# Patient Record
Sex: MALE | Race: WHITE | NOT HISPANIC OR LATINO | URBAN - METROPOLITAN AREA
[De-identification: names, ages, dates, MRNs, and addresses within clinical notes are randomized per-mention and may not be internally consistent; named-entity substitution may affect disease eponyms.]

---

## 2017-08-31 ENCOUNTER — EMERGENCY (EMERGENCY)
Facility: HOSPITAL | Age: 47
LOS: 1 days | Discharge: HOME | End: 2017-08-31
Attending: EMERGENCY MEDICINE | Admitting: EMERGENCY MEDICINE
Payer: COMMERCIAL

## 2017-08-31 VITALS
WEIGHT: 203.93 LBS | RESPIRATION RATE: 18 BRPM | HEART RATE: 102 BPM | HEIGHT: 67 IN | DIASTOLIC BLOOD PRESSURE: 88 MMHG | TEMPERATURE: 98 F | OXYGEN SATURATION: 98 % | SYSTOLIC BLOOD PRESSURE: 153 MMHG

## 2017-08-31 PROCEDURE — 71020: CPT | Mod: 26

## 2017-08-31 PROCEDURE — 99283 EMERGENCY DEPT VISIT LOW MDM: CPT | Mod: 25

## 2017-08-31 PROCEDURE — 99284 EMERGENCY DEPT VISIT MOD MDM: CPT

## 2017-08-31 PROCEDURE — 71046 X-RAY EXAM CHEST 2 VIEWS: CPT

## 2017-08-31 RX ORDER — LIDOCAINE 4 G/100G
1 CREAM TOPICAL ONCE
Qty: 0 | Refills: 0 | Status: COMPLETED | OUTPATIENT
Start: 2017-08-31 | End: 2017-08-31

## 2017-08-31 RX ORDER — LIDOCAINE 4 G/100G
1 CREAM TOPICAL
Qty: 1 | Refills: 0 | OUTPATIENT
Start: 2017-08-31 | End: 2017-09-07

## 2017-08-31 RX ORDER — ACETAMINOPHEN 500 MG
650 TABLET ORAL ONCE
Qty: 0 | Refills: 0 | Status: COMPLETED | OUTPATIENT
Start: 2017-08-31 | End: 2017-08-31

## 2017-08-31 RX ORDER — DIAZEPAM 5 MG
1 TABLET ORAL
Qty: 9 | Refills: 0 | OUTPATIENT
Start: 2017-08-31 | End: 2017-09-03

## 2017-08-31 RX ADMIN — LIDOCAINE 1 PATCH: 4 CREAM TOPICAL at 19:22

## 2017-08-31 RX ADMIN — Medication 650 MILLIGRAM(S): at 19:22

## 2017-08-31 NOTE — ED PROVIDER NOTE - MEDICAL DECISION MAKING DETAILS
45 yo M PMHx HTN, HLD, hypothyroidism p/w mid back pain following a twisting motion at work about 30 mins ago, pain control, CXR, reevaluate

## 2017-08-31 NOTE — ED PROVIDER NOTE - NS ED ROS FT
GENERAL: No fever or chills, EYES: no change in vision, HEENT: no trouble swallowing or speaking, CARDIAC: no chest pain, PULMONARY: no cough or SOB, GI: no abdominal pain, : No changes in urination, SKIN: no rashes, NEURO: no headache,  MSK: mid back pain on left side ~Kusum Marrero M.D. Resident

## 2017-08-31 NOTE — ED PROVIDER NOTE - OBJECTIVE STATEMENT
47 yo M PMHx HLD, hypothyroidism, HTN p/w mid back pain following a twisting motion about 30 minutes ago at work. Pt bent down to  some papers and felt a sharp pain in his back. He was able to ambulate following the incident. He denies bowel or bladder changes, IVDU, recent corticosteroid use, hx of cancer.

## 2017-08-31 NOTE — ED PROVIDER NOTE - CARE PLAN
Principal Discharge DX:	Back pain  Instructions for follow-up, activity and diet:	You were seen in the Emergency Department for back pain.   1) Advance activity as tolerated. Avoid any heavy lifting or twisting motions.   2) Continue all previously prescribed medications as directed.    3) Follow up with your primary care physician in 24-48 hours.   4) You may take Diazepam (Valium) 5 mg every 8 hours as needed for pain for up to 3 days. You may take ibuprofen, 600 mg, every 6 to 8 hours for up to 2 weeks for pain control. You may take Tylenol (acetaminophen) 1000 mg every 6 to 8 hours with a daily maximal dose of 3000 mg.  5) Return to the Emergency Department for weakness or numbness/tingling in your legs, urinary retention or incontinence, pain unrelieved by medication, worsening or persistent symptoms, and/or ANY NEW OR CONCERNING SYMPTOMS.   6) If you have issues obtaining follow up, please call: [phone] to obtain a doctor or specialist who takes your insurance in your area. Principal Discharge DX:	Back pain  Instructions for follow-up, activity and diet:	You were seen in the Emergency Department for back pain.   1) Advance activity as tolerated. Avoid any heavy lifting or twisting motions.   2) Continue all previously prescribed medications as directed.    3) Follow up with your primary care physician in 24-48 hours.   4) You may take Diazepam (Valium) 5 mg every 8 hours as needed for pain for up to 3 days. You may take ibuprofen, 600 mg, every 6 to 8 hours for up to 2 weeks for pain control. You may take Tylenol (acetaminophen) 1000 mg every 6 to 8 hours with a daily maximal dose of 3000 mg.  5) Return to the Emergency Department for weakness or numbness/tingling in your legs, urinary retention or incontinence, pain unrelieved by medication, worsening or persistent symptoms, and/or ANY NEW OR CONCERNING SYMPTOMS.   6) If you have issues obtaining follow up, please call: [phone] to obtain a doctor or specialist who takes your insurance in your area.

## 2017-08-31 NOTE — ED PROVIDER NOTE - PLAN OF CARE
You were seen in the Emergency Department for back pain.   1) Advance activity as tolerated. Avoid any heavy lifting or twisting motions.   2) Continue all previously prescribed medications as directed.    3) Follow up with your primary care physician in 24-48 hours.   4) You may take Diazepam (Valium) 5 mg every 8 hours as needed for pain for up to 3 days. You may take ibuprofen, 600 mg, every 6 to 8 hours for up to 2 weeks for pain control. You may take Tylenol (acetaminophen) 1000 mg every 6 to 8 hours with a daily maximal dose of 3000 mg.  5) Return to the Emergency Department for weakness or numbness/tingling in your legs, urinary retention or incontinence, pain unrelieved by medication, worsening or persistent symptoms, and/or ANY NEW OR CONCERNING SYMPTOMS.   6) If you have issues obtaining follow up, please call: [phone] to obtain a doctor or specialist who takes your insurance in your area.

## 2017-08-31 NOTE — ED PROVIDER NOTE - ATTENDING CONTRIBUTION TO CARE
L lower lateral thoracic back pain since reaching/twisting/bending for an object earlier in the afternoon.  "Felt something pop".  No other complaints.  No numbness, tingling and weakness, no midline pain.    On exam patient well appearing, vital signs within normal limits, no midline tenderness to palpation, palpation of lower left thoracic ribs reproduces complaint.    MSK thoracic cage pain, do not suspect rib fracture, no evidence of spinal cord emergency, will treat pain, plain films of chest per patient request, re-evaluate.

## 2017-08-31 NOTE — ED PROVIDER NOTE - PHYSICAL EXAMINATION
Gen: AAOx3, non-toxic  Head: NCAT  HEENT: EOMI, oral mucosa moist, normal conjunctiva  Lung: CTAB, no respiratory distress, no wheezes/rhonchi/rales B/L, speaking in full sentences  CV: RRR, no murmurs, rubs or gallops  Abd: soft, NTND, no guarding  MSK: no visible deformities, mid thoracic tenderness, paraspinal tenderness in left thoracic region  Neuro: No focal sensory or motor deficits, +5/5 strength BLLE, +2 patellar reflexes bilaterally, negative SLR bilaterally  Skin: Warm, well perfused, no rash  Psych: normal affect.   ~Kusum Marrero M.D. Resident

## 2017-08-31 NOTE — ED ADULT NURSE NOTE - OBJECTIVE STATEMENT
47 y/o male presents to the ED. A&Ox3. C/O L upper back pain. Pt. reports lifting something while working and " I heard a snap" No bruising, edema, or bleeding present. +Easy work of breathing. L upper  upon palpation. Peripheral pulse present, Cap refill<2 seconds. Pt. denies the pain radiates to anywhere else.  Steady gait. Full range of motion of upper and lower extremities. Skin is intact, dry, and warm to touch. Pt. denies headache, blurry vision, SOB, chest pain, N/V/D, or numbness and tingling. Safety maintained.

## 2017-08-31 NOTE — ED PROVIDER NOTE - PROGRESS NOTE DETAILS
Pt feels minimal relief from Tylenol and lidoderm patch, agrees with plan to discharge home with Rx for 3 days of Diazepam and lidoderm patches. Pt verbalizes understanding of follow up with PMD.  Kusum Marrero M.D. Resident

## 2017-11-14 ENCOUNTER — TRANSCRIPTION ENCOUNTER (OUTPATIENT)
Age: 47
End: 2017-11-14

## 2017-12-13 ENCOUNTER — APPOINTMENT (OUTPATIENT)
Dept: OTOLARYNGOLOGY | Facility: CLINIC | Age: 47
End: 2017-12-13

## 2018-01-11 ENCOUNTER — TRANSCRIPTION ENCOUNTER (OUTPATIENT)
Age: 48
End: 2018-01-11

## 2018-01-19 ENCOUNTER — TRANSCRIPTION ENCOUNTER (OUTPATIENT)
Age: 48
End: 2018-01-19

## 2018-03-14 ENCOUNTER — EMERGENCY (EMERGENCY)
Facility: HOSPITAL | Age: 48
LOS: 1 days | Discharge: HOME | End: 2018-03-14
Attending: EMERGENCY MEDICINE | Admitting: EMERGENCY MEDICINE
Payer: COMMERCIAL

## 2018-03-14 VITALS
SYSTOLIC BLOOD PRESSURE: 127 MMHG | OXYGEN SATURATION: 98 % | RESPIRATION RATE: 18 BRPM | HEIGHT: 67 IN | HEART RATE: 108 BPM | DIASTOLIC BLOOD PRESSURE: 75 MMHG | WEIGHT: 184.97 LBS | TEMPERATURE: 98 F

## 2018-03-14 PROCEDURE — 99283 EMERGENCY DEPT VISIT LOW MDM: CPT

## 2018-03-14 PROCEDURE — 99282 EMERGENCY DEPT VISIT SF MDM: CPT

## 2018-03-14 RX ORDER — ONDANSETRON 8 MG/1
1 TABLET, FILM COATED ORAL
Qty: 12 | Refills: 0 | OUTPATIENT
Start: 2018-03-14 | End: 2018-03-16

## 2018-03-14 NOTE — ED PROVIDER NOTE - OBJECTIVE STATEMENT
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  No hx of inguinal hernias.  Denies any significant abdominal pain or discomfort.  Denies any relevant pmhx/pshx.

## 2018-03-14 NOTE — ED ADULT NURSE NOTE - OBJECTIVE STATEMENT
46 yo male experiencing RLQ pain following transferring a patient about 30 min ago. He c/o sudden onset right groin pain after pulling patient up on stretcher. There is no bulge present at the moment. Patient observed ambulating without difficulty. There is NAD. Will continue to monitor. Safety and comfort maintained.

## 2018-03-14 NOTE — ED PROVIDER NOTE - MEDICAL DECISION MAKING DETAILS
Inguinal hernia that is non-obstructing or not incarcerated. general surgery outpatient follow up advised.

## 2018-03-14 NOTE — ED PROVIDER NOTE - ATTENDING CONTRIBUTION TO CARE
Attending MD Del Cid: I personally have seen and examined this patient.  Resident note reviewed and agree on plan of care and except where noted.  See below for details.    47M with PMH HTN on HCTZ, Losartan presents to the ED with abdominal bulge after lifting.  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  Denies history of inguinal hernias.  Denies testicular pain.  Denies loss of urinary or bowel continence. Denies dysuria, hematuria. Denies nausea, vomiting, blood in stools.  On exam, NAD, head NCAT, PERRL, FROM at neck, no tenderness to palpation or stepoffs along length of spine, lungs CTAB with good inspiratory effort, +S1S2, no m/r/g, abdomen soft with +BS, NT, ND, no CVAT, +L inguinal hernia self reduces with laying supine, moving all extremities with 5/5 strength bilateral upper and lower extremities, good and equal  strength bilaterally, sensory grossly intact; A/P: 47M with L inguinal hernia, after heavy lifting, will discharge with precautions of no heavy lifting, stool softeners to avoid having to strain and return precautions if hernia does not self reduces with laying supine or with gentle pressure, patient to follow up with general surgery clinic for likely repair. Follow up instructions given, importance of follow up emphasized, return to ED parameters reviewed and patient verbalized understanding.  All questions answered, all concerns addressed.

## 2018-03-29 ENCOUNTER — APPOINTMENT (OUTPATIENT)
Dept: SURGERY | Facility: CLINIC | Age: 48
End: 2018-03-29
Payer: OTHER MISCELLANEOUS

## 2018-03-29 VITALS
BODY MASS INDEX: 28.25 KG/M2 | OXYGEN SATURATION: 99 % | HEIGHT: 67 IN | DIASTOLIC BLOOD PRESSURE: 86 MMHG | HEART RATE: 98 BPM | WEIGHT: 180 LBS | SYSTOLIC BLOOD PRESSURE: 143 MMHG | TEMPERATURE: 97.9 F | RESPIRATION RATE: 16 BRPM

## 2018-03-29 DIAGNOSIS — Z78.9 OTHER SPECIFIED HEALTH STATUS: ICD-10-CM

## 2018-03-29 DIAGNOSIS — Z83.3 FAMILY HISTORY OF DIABETES MELLITUS: ICD-10-CM

## 2018-03-29 PROCEDURE — 99204 OFFICE O/P NEW MOD 45 MIN: CPT

## 2018-03-30 ENCOUNTER — OTHER (OUTPATIENT)
Age: 48
End: 2018-03-30

## 2018-04-04 ENCOUNTER — APPOINTMENT (OUTPATIENT)
Dept: FAMILY MEDICINE | Facility: CLINIC | Age: 48
End: 2018-04-04

## 2018-04-13 ENCOUNTER — TRANSCRIPTION ENCOUNTER (OUTPATIENT)
Age: 48
End: 2018-04-13

## 2018-04-13 ENCOUNTER — APPOINTMENT (OUTPATIENT)
Dept: FAMILY MEDICINE | Facility: CLINIC | Age: 48
End: 2018-04-13
Payer: COMMERCIAL

## 2018-04-13 ENCOUNTER — NON-APPOINTMENT (OUTPATIENT)
Age: 48
End: 2018-04-13

## 2018-04-13 VITALS
SYSTOLIC BLOOD PRESSURE: 138 MMHG | HEIGHT: 67 IN | HEART RATE: 88 BPM | DIASTOLIC BLOOD PRESSURE: 76 MMHG | WEIGHT: 189 LBS | BODY MASS INDEX: 29.66 KG/M2

## 2018-04-13 PROCEDURE — 93000 ELECTROCARDIOGRAM COMPLETE: CPT

## 2018-04-13 PROCEDURE — 36415 COLL VENOUS BLD VENIPUNCTURE: CPT

## 2018-04-13 PROCEDURE — 99205 OFFICE O/P NEW HI 60 MIN: CPT | Mod: 25

## 2018-04-13 RX ORDER — SILDENAFIL 20 MG/1
20 TABLET ORAL DAILY
Qty: 30 | Refills: 0 | Status: DISCONTINUED | COMMUNITY
Start: 2018-04-13 | End: 2018-04-13

## 2018-04-13 RX ORDER — LOSARTAN POTASSIUM 100 MG/1
TABLET, FILM COATED ORAL
Refills: 0 | Status: DISCONTINUED | COMMUNITY
End: 2018-04-13

## 2018-04-16 LAB
25(OH)D3 SERPL-MCNC: 66.6 NG/ML
ALBUMIN SERPL ELPH-MCNC: 5 G/DL
ALP BLD-CCNC: 51 U/L
ALT SERPL-CCNC: 26 U/L
ANION GAP SERPL CALC-SCNC: 12 MMOL/L
APPEARANCE: CLEAR
AST SERPL-CCNC: 25 U/L
BASOPHILS # BLD AUTO: 0.02 K/UL
BASOPHILS NFR BLD AUTO: 0.3 %
BILIRUB SERPL-MCNC: 0.5 MG/DL
BILIRUBIN URINE: NEGATIVE
BLOOD URINE: NEGATIVE
BUN SERPL-MCNC: 16 MG/DL
CALCIUM SERPL-MCNC: 9.5 MG/DL
CHLORIDE SERPL-SCNC: 100 MMOL/L
CHOLEST SERPL-MCNC: 115 MG/DL
CHOLEST/HDLC SERPL: 2.6 RATIO
CO2 SERPL-SCNC: 29 MMOL/L
COLOR: YELLOW
CREAT SERPL-MCNC: 1.5 MG/DL
EOSINOPHIL # BLD AUTO: 0.14 K/UL
EOSINOPHIL NFR BLD AUTO: 1.9 %
FERRITIN SERPL-MCNC: 250 NG/ML
FOLATE SERPL-MCNC: 12.9 NG/ML
GLUCOSE QUALITATIVE U: NEGATIVE MG/DL
GLUCOSE SERPL-MCNC: 85 MG/DL
HBA1C MFR BLD HPLC: 5 %
HCT VFR BLD CALC: 50.1 %
HDLC SERPL-MCNC: 45 MG/DL
HGB BLD-MCNC: 16.7 G/DL
IMM GRANULOCYTES NFR BLD AUTO: 0.3 %
IRON SATN MFR SERPL: 25 %
IRON SERPL-MCNC: 75 UG/DL
KETONES URINE: NEGATIVE
LDLC SERPL CALC-MCNC: 61 MG/DL
LEUKOCYTE ESTERASE URINE: NEGATIVE
LYMPHOCYTES # BLD AUTO: 1.91 K/UL
LYMPHOCYTES NFR BLD AUTO: 26.1 %
MAN DIFF?: NORMAL
MCHC RBC-ENTMCNC: 30.7 PG
MCHC RBC-ENTMCNC: 33.3 GM/DL
MCV RBC AUTO: 92.1 FL
MONOCYTES # BLD AUTO: 0.87 K/UL
MONOCYTES NFR BLD AUTO: 11.9 %
NEUTROPHILS # BLD AUTO: 4.35 K/UL
NEUTROPHILS NFR BLD AUTO: 59.5 %
NITRITE URINE: NEGATIVE
PH URINE: 7
PLATELET # BLD AUTO: 262 K/UL
POTASSIUM SERPL-SCNC: 4.3 MMOL/L
PROT SERPL-MCNC: 7.1 G/DL
PROTEIN URINE: NEGATIVE MG/DL
RBC # BLD: 5.44 M/UL
RBC # FLD: 14.2 %
SODIUM SERPL-SCNC: 141 MMOL/L
SPECIFIC GRAVITY URINE: 1.01
T4 FREE SERPL-MCNC: 1.6 NG/DL
TIBC SERPL-MCNC: 306 UG/DL
TRIGL SERPL-MCNC: 44 MG/DL
TSH SERPL-ACNC: 2.32 UIU/ML
UIBC SERPL-MCNC: 231 UG/DL
UROBILINOGEN URINE: NEGATIVE MG/DL
VIT B12 SERPL-MCNC: 1083 PG/ML
WBC # FLD AUTO: 7.31 K/UL

## 2018-04-18 ENCOUNTER — OUTPATIENT (OUTPATIENT)
Dept: OUTPATIENT SERVICES | Facility: HOSPITAL | Age: 48
LOS: 1 days | End: 2018-04-18
Payer: COMMERCIAL

## 2018-04-18 VITALS
WEIGHT: 188.05 LBS | DIASTOLIC BLOOD PRESSURE: 80 MMHG | RESPIRATION RATE: 16 BRPM | OXYGEN SATURATION: 98 % | TEMPERATURE: 98 F | HEART RATE: 96 BPM | HEIGHT: 67 IN | SYSTOLIC BLOOD PRESSURE: 138 MMHG

## 2018-04-18 DIAGNOSIS — Z01.818 ENCOUNTER FOR OTHER PREPROCEDURAL EXAMINATION: ICD-10-CM

## 2018-04-18 DIAGNOSIS — I10 ESSENTIAL (PRIMARY) HYPERTENSION: ICD-10-CM

## 2018-04-18 DIAGNOSIS — K40.90 UNILATERAL INGUINAL HERNIA, WITHOUT OBSTRUCTION OR GANGRENE, NOT SPECIFIED AS RECURRENT: ICD-10-CM

## 2018-04-18 DIAGNOSIS — E03.9 HYPOTHYROIDISM, UNSPECIFIED: ICD-10-CM

## 2018-04-18 PROCEDURE — G0463: CPT

## 2018-04-18 RX ORDER — LIDOCAINE HCL 20 MG/ML
0.2 VIAL (ML) INJECTION ONCE
Qty: 0 | Refills: 0 | Status: DISCONTINUED | OUTPATIENT
Start: 2018-04-23 | End: 2018-05-08

## 2018-04-18 RX ORDER — SODIUM CHLORIDE 9 MG/ML
3 INJECTION INTRAMUSCULAR; INTRAVENOUS; SUBCUTANEOUS EVERY 8 HOURS
Qty: 0 | Refills: 0 | Status: DISCONTINUED | OUTPATIENT
Start: 2018-04-23 | End: 2018-05-08

## 2018-04-18 NOTE — H&P PST ADULT - NSANTHOSAYNRD_GEN_A_CORE
No. DIANE screening performed.  STOP BANG Legend: 0-2 = LOW Risk; 3-4 = INTERMEDIATE Risk; 5-8 = HIGH Risk

## 2018-04-18 NOTE — H&P PST ADULT - HISTORY OF PRESENT ILLNESS
47 year old male with PMH HTN, hypothyroidism, HLD presents with hx of groin pain pre right inguinal hernia repair scheduled for 4/23/19. 47 year old male with PMH HTN, hypothyroidism, HLD presents with hx of groin pain pre right inguinal hernia repair scheduled for 4/23/19. Baseline creatinine 1.5 as per pt due to increased muscle mass

## 2018-04-18 NOTE — H&P PST ADULT - PMH
HTN (hypertension)    Hypercholesteremia HTN (hypertension)    Hypercholesteremia    Hypothyroidism    Unilateral inguinal hernia without obstruction or gangrene, recurrence not specified

## 2018-04-22 ENCOUNTER — TRANSCRIPTION ENCOUNTER (OUTPATIENT)
Age: 48
End: 2018-04-22

## 2018-04-23 ENCOUNTER — OUTPATIENT (OUTPATIENT)
Dept: OUTPATIENT SERVICES | Facility: HOSPITAL | Age: 48
LOS: 1 days | End: 2018-04-23
Payer: COMMERCIAL

## 2018-04-23 ENCOUNTER — APPOINTMENT (OUTPATIENT)
Dept: SURGERY | Facility: HOSPITAL | Age: 48
End: 2018-04-23
Payer: OTHER MISCELLANEOUS

## 2018-04-23 VITALS
DIASTOLIC BLOOD PRESSURE: 86 MMHG | HEART RATE: 96 BPM | RESPIRATION RATE: 16 BRPM | TEMPERATURE: 98 F | WEIGHT: 188.05 LBS | SYSTOLIC BLOOD PRESSURE: 145 MMHG | HEIGHT: 67 IN | OXYGEN SATURATION: 100 %

## 2018-04-23 VITALS
HEART RATE: 74 BPM | SYSTOLIC BLOOD PRESSURE: 117 MMHG | RESPIRATION RATE: 16 BRPM | OXYGEN SATURATION: 97 % | DIASTOLIC BLOOD PRESSURE: 70 MMHG

## 2018-04-23 DIAGNOSIS — K40.90 UNILATERAL INGUINAL HERNIA, WITHOUT OBSTRUCTION OR GANGRENE, NOT SPECIFIED AS RECURRENT: ICD-10-CM

## 2018-04-23 DIAGNOSIS — Z01.818 ENCOUNTER FOR OTHER PREPROCEDURAL EXAMINATION: ICD-10-CM

## 2018-04-23 PROCEDURE — 49525 REPAIR ING HERNIA SLIDING: CPT | Mod: RT

## 2018-04-23 PROCEDURE — C1781: CPT

## 2018-04-23 RX ORDER — FAMOTIDINE 10 MG/ML
20 INJECTION INTRAVENOUS ONCE
Qty: 0 | Refills: 0 | Status: COMPLETED | OUTPATIENT
Start: 2018-04-23 | End: 2018-04-23

## 2018-04-23 RX ORDER — HYDROMORPHONE HYDROCHLORIDE 2 MG/ML
0.25 INJECTION INTRAMUSCULAR; INTRAVENOUS; SUBCUTANEOUS
Qty: 0 | Refills: 0 | Status: DISCONTINUED | OUTPATIENT
Start: 2018-04-23 | End: 2018-04-23

## 2018-04-23 RX ORDER — ACETAMINOPHEN 500 MG
1000 TABLET ORAL ONCE
Qty: 0 | Refills: 0 | Status: COMPLETED | OUTPATIENT
Start: 2018-04-23 | End: 2018-04-23

## 2018-04-23 RX ORDER — OXYCODONE HYDROCHLORIDE 5 MG/1
10 TABLET ORAL ONCE
Qty: 0 | Refills: 0 | Status: DISCONTINUED | OUTPATIENT
Start: 2018-04-23 | End: 2018-04-23

## 2018-04-23 RX ORDER — SODIUM CHLORIDE 9 MG/ML
1000 INJECTION, SOLUTION INTRAVENOUS
Qty: 0 | Refills: 0 | Status: DISCONTINUED | OUTPATIENT
Start: 2018-04-23 | End: 2018-05-08

## 2018-04-23 RX ORDER — CELECOXIB 200 MG/1
200 CAPSULE ORAL ONCE
Qty: 0 | Refills: 0 | Status: DISCONTINUED | OUTPATIENT
Start: 2018-04-23 | End: 2018-05-08

## 2018-04-23 RX ORDER — ONDANSETRON 8 MG/1
4 TABLET, FILM COATED ORAL ONCE
Qty: 0 | Refills: 0 | Status: COMPLETED | OUTPATIENT
Start: 2018-04-23 | End: 2018-04-23

## 2018-04-23 RX ORDER — CEFAZOLIN SODIUM 1 G
2000 VIAL (EA) INJECTION ONCE
Qty: 0 | Refills: 0 | Status: COMPLETED | OUTPATIENT
Start: 2018-04-23 | End: 2018-04-23

## 2018-04-23 RX ORDER — CELECOXIB 200 MG/1
200 CAPSULE ORAL ONCE
Qty: 0 | Refills: 0 | Status: COMPLETED | OUTPATIENT
Start: 2018-04-23 | End: 2018-04-23

## 2018-04-23 RX ADMIN — FAMOTIDINE 20 MILLIGRAM(S): 10 INJECTION INTRAVENOUS at 10:02

## 2018-04-23 RX ADMIN — CELECOXIB 200 MILLIGRAM(S): 200 CAPSULE ORAL at 10:02

## 2018-04-23 RX ADMIN — Medication 1000 MILLIGRAM(S): at 10:02

## 2018-04-23 RX ADMIN — ONDANSETRON 4 MILLIGRAM(S): 8 TABLET, FILM COATED ORAL at 11:33

## 2018-04-23 RX ADMIN — OXYCODONE HYDROCHLORIDE 10 MILLIGRAM(S): 5 TABLET ORAL at 11:32

## 2018-04-23 NOTE — ASU PATIENT PROFILE, ADULT - PMH
HTN (hypertension)    Hypercholesteremia    Hypothyroidism    Unilateral inguinal hernia without obstruction or gangrene, recurrence not specified

## 2018-04-23 NOTE — ASU DISCHARGE PLAN (ADULT/PEDIATRIC). - ITEMS TO FOLLOWUP WITH YOUR PHYSICIAN'S
Please call to schedule an appointment with Dr. Rollins this Thursday to evaluate returning to work on Monday.

## 2018-04-23 NOTE — ASU DISCHARGE PLAN (ADULT/PEDIATRIC). - SPECIAL INSTRUCTIONS
You may alternate taking Tylenol/Motrin as needed for mild-moderate pain and you can take the Percocet prescribed to you for severe pain, but do not drive while taking it.

## 2018-04-23 NOTE — ASU DISCHARGE PLAN (ADULT/PEDIATRIC). - MEDICATION SUMMARY - MEDICATIONS TO TAKE
I will START or STAY ON the medications listed below when I get home from the hospital:    aspirin 81 mg oral delayed release capsule  --  by mouth   -- Indication: For blood thinner    Percocet 5/325 oral tablet  -- 1 tab(s) by mouth every 6 hours, As Needed for moderate to severe pain. MDD:4 tabs  -- Caution federal law prohibits the transfer of this drug to any person other  than the person for whom it was prescribed.  May cause drowsiness.  Alcohol may intensify this effect.  Use care when operating dangerous machinery.  This prescription cannot be refilled.  This product contains acetaminophen.  Do not use  with any other product containing acetaminophen to prevent possible liver damage.  Using more of this medication than prescribed may cause serious breathing problems.    -- Indication: For moderate to severe pain    losartan 100 mg oral tablet  -- 1 tab(s) by mouth once a day  -- Indication: For hypertension    atorvastatin 10 mg oral tablet  -- 1 tab(s) by mouth once a day  -- Indication: For cholesterol    Pepcid 20 mg oral tablet  -- x 2 pre-op as directed  -- Indication: For reflux    Synthroid 50 mcg (0.05 mg) oral tablet  -- 1 tab(s) by mouth once a day  -- Indication: For hypothyroidism

## 2018-04-26 ENCOUNTER — APPOINTMENT (OUTPATIENT)
Dept: UROLOGY | Facility: CLINIC | Age: 48
End: 2018-04-26
Payer: COMMERCIAL

## 2018-04-26 ENCOUNTER — OUTPATIENT (OUTPATIENT)
Dept: OUTPATIENT SERVICES | Facility: HOSPITAL | Age: 48
LOS: 1 days | End: 2018-04-26
Payer: COMMERCIAL

## 2018-04-26 ENCOUNTER — APPOINTMENT (OUTPATIENT)
Dept: SURGERY | Facility: CLINIC | Age: 48
End: 2018-04-26
Payer: OTHER MISCELLANEOUS

## 2018-04-26 VITALS
TEMPERATURE: 97.8 F | DIASTOLIC BLOOD PRESSURE: 100 MMHG | SYSTOLIC BLOOD PRESSURE: 155 MMHG | HEART RATE: 89 BPM | RESPIRATION RATE: 16 BRPM | OXYGEN SATURATION: 100 %

## 2018-04-26 DIAGNOSIS — Z87.19 PERSONAL HISTORY OF OTHER DISEASES OF THE DIGESTIVE SYSTEM: ICD-10-CM

## 2018-04-26 DIAGNOSIS — R35.0 FREQUENCY OF MICTURITION: ICD-10-CM

## 2018-04-26 PROCEDURE — 99024 POSTOP FOLLOW-UP VISIT: CPT

## 2018-04-26 PROCEDURE — 93975 VASCULAR STUDY: CPT

## 2018-04-26 PROCEDURE — 99244 OFF/OP CNSLTJ NEW/EST MOD 40: CPT | Mod: 25

## 2018-04-26 PROCEDURE — 76870 US EXAM SCROTUM: CPT

## 2018-04-26 PROCEDURE — 93975 VASCULAR STUDY: CPT | Mod: 26

## 2018-04-26 PROCEDURE — 76870 US EXAM SCROTUM: CPT | Mod: 26

## 2018-04-26 RX ORDER — OXYCODONE AND ACETAMINOPHEN 5; 325 MG/1; MG/1
5-325 TABLET ORAL
Qty: 20 | Refills: 0 | Status: DISCONTINUED | COMMUNITY
Start: 2018-04-23

## 2018-04-28 ENCOUNTER — TRANSCRIPTION ENCOUNTER (OUTPATIENT)
Age: 48
End: 2018-04-28

## 2018-04-29 LAB
ALBUMIN SERPL ELPH-MCNC: 4.4 G/DL
ALP BLD-CCNC: 47 U/L
ALT SERPL-CCNC: 30 U/L
ANION GAP SERPL CALC-SCNC: 13 MMOL/L
APPEARANCE: CLEAR
AST SERPL-CCNC: 21 U/L
BASOPHILS # BLD AUTO: 0.03 K/UL
BASOPHILS NFR BLD AUTO: 0.5 %
BILIRUB SERPL-MCNC: 0.5 MG/DL
BILIRUBIN URINE: NEGATIVE
BLOOD URINE: NEGATIVE
BUN SERPL-MCNC: 16 MG/DL
CALCIUM SERPL-MCNC: 9.4 MG/DL
CHLORIDE SERPL-SCNC: 99 MMOL/L
CHOLEST SERPL-MCNC: 114 MG/DL
CHOLEST/HDLC SERPL: 2.5 RATIO
CO2 SERPL-SCNC: 30 MMOL/L
COLOR: YELLOW
CREAT SERPL-MCNC: 1.34 MG/DL
EOSINOPHIL # BLD AUTO: 0.06 K/UL
EOSINOPHIL NFR BLD AUTO: 0.9 %
ESTRADIOL SERPL-MCNC: 30 PG/ML
GLUCOSE QUALITATIVE U: NEGATIVE MG/DL
GLUCOSE SERPL-MCNC: 89 MG/DL
HBA1C MFR BLD HPLC: 5.2 %
HCT VFR BLD CALC: 46.6 %
HDLC SERPL-MCNC: 45 MG/DL
HGB BLD-MCNC: 16.4 G/DL
IMM GRANULOCYTES NFR BLD AUTO: 0.5 %
KETONES URINE: NEGATIVE
LDLC SERPL CALC-MCNC: 51 MG/DL
LEUKOCYTE ESTERASE URINE: NEGATIVE
LH SERPL-ACNC: 0.2 IU/L
LYMPHOCYTES # BLD AUTO: 0.97 K/UL
LYMPHOCYTES NFR BLD AUTO: 14.7 %
MAN DIFF?: NORMAL
MCHC RBC-ENTMCNC: 31.9 PG
MCHC RBC-ENTMCNC: 35.2 GM/DL
MCV RBC AUTO: 90.7 FL
MONOCYTES # BLD AUTO: 0.69 K/UL
MONOCYTES NFR BLD AUTO: 10.5 %
NEUTROPHILS # BLD AUTO: 4.82 K/UL
NEUTROPHILS NFR BLD AUTO: 72.9 %
NITRITE URINE: NEGATIVE
PH URINE: 7.5
PLATELET # BLD AUTO: 213 K/UL
POTASSIUM SERPL-SCNC: 4.2 MMOL/L
PROLACTIN SERPL-MCNC: 6.3 NG/ML
PROT SERPL-MCNC: 6.9 G/DL
PROTEIN URINE: NEGATIVE MG/DL
PSA SERPL-MCNC: 1.46 NG/ML
RBC # BLD: 5.14 M/UL
RBC # FLD: 13.8 %
SODIUM SERPL-SCNC: 142 MMOL/L
SPECIFIC GRAVITY URINE: 1.01
TRIGL SERPL-MCNC: 92 MG/DL
TSH SERPL-ACNC: 1.31 UIU/ML
UROBILINOGEN URINE: NEGATIVE MG/DL
WBC # FLD AUTO: 6.6 K/UL

## 2018-04-30 DIAGNOSIS — N52.9 MALE ERECTILE DYSFUNCTION, UNSPECIFIED: ICD-10-CM

## 2018-04-30 DIAGNOSIS — R79.89 OTHER SPECIFIED ABNORMAL FINDINGS OF BLOOD CHEMISTRY: ICD-10-CM

## 2018-04-30 DIAGNOSIS — E34.9 ENDOCRINE DISORDER, UNSPECIFIED: ICD-10-CM

## 2018-05-03 LAB
TESTOST BND SERPL-MCNC: 28.1 PG/ML
TESTOST SERPL-MCNC: 735.6 NG/DL

## 2018-05-10 ENCOUNTER — OTHER (OUTPATIENT)
Age: 48
End: 2018-05-10

## 2018-05-10 ENCOUNTER — APPOINTMENT (OUTPATIENT)
Dept: FAMILY MEDICINE | Facility: CLINIC | Age: 48
End: 2018-05-10

## 2018-05-10 ENCOUNTER — APPOINTMENT (OUTPATIENT)
Dept: UROLOGY | Facility: CLINIC | Age: 48
End: 2018-05-10

## 2018-05-11 ENCOUNTER — APPOINTMENT (OUTPATIENT)
Dept: FAMILY MEDICINE | Facility: CLINIC | Age: 48
End: 2018-05-11

## 2018-05-17 ENCOUNTER — APPOINTMENT (OUTPATIENT)
Dept: UROLOGY | Facility: CLINIC | Age: 48
End: 2018-05-17
Payer: COMMERCIAL

## 2018-05-17 PROCEDURE — 99214 OFFICE O/P EST MOD 30 MIN: CPT

## 2018-05-21 ENCOUNTER — APPOINTMENT (OUTPATIENT)
Dept: FAMILY MEDICINE | Facility: CLINIC | Age: 48
End: 2018-05-21
Payer: COMMERCIAL

## 2018-05-21 VITALS
SYSTOLIC BLOOD PRESSURE: 142 MMHG | WEIGHT: 189 LBS | HEIGHT: 67 IN | BODY MASS INDEX: 29.66 KG/M2 | DIASTOLIC BLOOD PRESSURE: 90 MMHG

## 2018-05-21 DIAGNOSIS — Z80.0 FAMILY HISTORY OF MALIGNANT NEOPLASM OF DIGESTIVE ORGANS: ICD-10-CM

## 2018-05-21 DIAGNOSIS — Z84.1 FAMILY HISTORY OF DISORDERS OF KIDNEY AND URETER: ICD-10-CM

## 2018-05-21 PROCEDURE — 99204 OFFICE O/P NEW MOD 45 MIN: CPT

## 2018-05-21 RX ORDER — TESTOSTERONE CYPIONATE 200 MG/ML
200 INJECTION, SOLUTION INTRAMUSCULAR WEEKLY
Qty: 1 | Refills: 0 | Status: DISCONTINUED | COMMUNITY
Start: 2018-04-13 | End: 2018-05-21

## 2018-05-21 RX ORDER — ALPRAZOLAM 2 MG/1
TABLET ORAL
Refills: 0 | Status: DISCONTINUED | COMMUNITY
End: 2018-05-21

## 2018-05-21 RX ORDER — SILDENAFIL 20 MG/1
20 TABLET ORAL
Qty: 30 | Refills: 2 | Status: DISCONTINUED | COMMUNITY
Start: 2018-04-26 | End: 2018-05-21

## 2018-05-21 RX ORDER — ZOLPIDEM TARTRATE 5 MG/1
TABLET, FILM COATED ORAL
Refills: 0 | Status: DISCONTINUED | COMMUNITY
End: 2018-05-21

## 2018-05-21 NOTE — HEALTH RISK ASSESSMENT
[1] : 1) Little interest or pleasure doing things for several days (1) [0] : 2) Feeling down, depressed, or hopeless: Not at all (0) [[id number]] : 1

## 2018-06-01 ENCOUNTER — APPOINTMENT (OUTPATIENT)
Dept: DERMATOLOGY | Facility: CLINIC | Age: 48
End: 2018-06-01

## 2018-06-11 ENCOUNTER — RX RENEWAL (OUTPATIENT)
Age: 48
End: 2018-06-11

## 2018-06-13 ENCOUNTER — APPOINTMENT (OUTPATIENT)
Dept: SURGERY | Facility: CLINIC | Age: 48
End: 2018-06-13

## 2018-06-13 DIAGNOSIS — Z12.11 ENCOUNTER FOR SCREENING FOR MALIGNANT NEOPLASM OF COLON: ICD-10-CM

## 2018-06-18 ENCOUNTER — RX RENEWAL (OUTPATIENT)
Age: 48
End: 2018-06-18

## 2018-06-19 ENCOUNTER — RX RENEWAL (OUTPATIENT)
Age: 48
End: 2018-06-19

## 2018-06-25 ENCOUNTER — NON-APPOINTMENT (OUTPATIENT)
Age: 48
End: 2018-06-25

## 2018-06-25 ENCOUNTER — APPOINTMENT (OUTPATIENT)
Dept: FAMILY MEDICINE | Facility: CLINIC | Age: 48
End: 2018-06-25
Payer: COMMERCIAL

## 2018-06-25 VITALS
SYSTOLIC BLOOD PRESSURE: 128 MMHG | BODY MASS INDEX: 32.18 KG/M2 | HEIGHT: 67 IN | DIASTOLIC BLOOD PRESSURE: 86 MMHG | WEIGHT: 205 LBS

## 2018-06-25 LAB
BILIRUB UR QL STRIP: 0
CLARITY UR: CLEAR
COLLECTION METHOD: NORMAL
GLUCOSE UR-MCNC: 0
HCG UR QL: 0.2 EU/DL
HGB UR QL STRIP.AUTO: 0
KETONES UR-MCNC: 0
LEUKOCYTE ESTERASE UR QL STRIP: 0
NITRITE UR QL STRIP: 0
PH UR STRIP: 6.5
PROT UR STRIP-MCNC: 0
SP GR UR STRIP: 1.01

## 2018-06-25 PROCEDURE — 99173 VISUAL ACUITY SCREEN: CPT | Mod: 59

## 2018-06-25 PROCEDURE — 93000 ELECTROCARDIOGRAM COMPLETE: CPT

## 2018-06-25 PROCEDURE — 36415 COLL VENOUS BLD VENIPUNCTURE: CPT

## 2018-06-25 PROCEDURE — 81002 URINALYSIS NONAUTO W/O SCOPE: CPT

## 2018-06-25 PROCEDURE — 99396 PREV VISIT EST AGE 40-64: CPT | Mod: 25

## 2018-06-25 PROCEDURE — 92551 PURE TONE HEARING TEST AIR: CPT | Mod: 59

## 2018-06-25 RX ORDER — OMEPRAZOLE 20 MG/1
20 TABLET, DELAYED RELEASE ORAL
Qty: 30 | Refills: 1 | Status: DISCONTINUED | COMMUNITY
Start: 2018-06-25 | End: 2018-06-25

## 2018-06-25 NOTE — HEALTH RISK ASSESSMENT
[Good] : ~his/her~ current health as good [Very Good] : ~his/her~  mood as very good [No falls in past year] : Patient reported no falls in the past year [0] : 2) Feeling down, depressed, or hopeless: Not at all (0) [None] : None [Alone] : lives alone [Employed] : employed [College] : College [Single] : single [] :  [# Of Children ___] : has [unfilled] children [Sexually Active] : sexually active [Feels Safe at Home] : Feels safe at home [Fully functional (bathing, dressing, toileting, transferring, walking, feeding)] : Fully functional (bathing, dressing, toileting, transferring, walking, feeding) [Fully functional (using the telephone, shopping, preparing meals, housekeeping, doing laundry, using] : Fully functional and needs no help or supervision to perform IADLs (using the telephone, shopping, preparing meals, housekeeping, doing laundry, using transportation, managing medications and managing finances) [Smoke Detector] : smoke detector [Carbon Monoxide Detector] : carbon monoxide detector [Safety elements used in home] : safety elements used in home [Seat Belt] :  uses seat belt [Discussed at today's visit] : Advance Directives Discussed at today's visit [] : No [[id number]] : 0 [Change in mental status noted] : No change in mental status noted [Language] : denies difficulty with language [Behavior] : denies difficulty with behavior [Handling Complex Tasks] : denies difficulty handling complex tasks [Reasoning] : denies difficulty with reasoning [Reports changes in hearing] : Reports no changes in hearing [Reports changes in vision] : Reports no changes in vision [Reports changes in dental health] : Reports no changes in dental health [Guns at Home] : no guns at home [Sunscreen] : does not use sunscreen [Travel to Developing Areas] : does not  travel to developing areas [TB Exposure] : is not being exposed to tuberculosis [HIVDate] : 06/15 [HepatitisCDate] : 06/17

## 2018-06-25 NOTE — PLAN
[FreeTextEntry1] : Continue to encourage getting off of the benzos. Cut down amount last time. will cut dose in half this time. Refill a

## 2018-06-25 NOTE — PHYSICAL EXAM
[20/___] : left eye 20/[unfilled] [No Acute Distress] : no acute distress [Well Nourished] : well nourished [Well Developed] : well developed [Well-Appearing] : well-appearing [Normal Sclera/Conjunctiva] : normal sclera/conjunctiva [PERRL] : pupils equal round and reactive to light [EOMI] : extraocular movements intact [Normal Outer Ear/Nose] : the outer ears and nose were normal in appearance [Normal Oropharynx] : the oropharynx was normal [No JVD] : no jugular venous distention [Supple] : supple [No Lymphadenopathy] : no lymphadenopathy [Thyroid Normal, No Nodules] : the thyroid was normal and there were no nodules present [No Respiratory Distress] : no respiratory distress  [Clear to Auscultation] : lungs were clear to auscultation bilaterally [No Accessory Muscle Use] : no accessory muscle use [Normal Rate] : normal rate  [Regular Rhythm] : with a regular rhythm [Normal S1, S2] : normal S1 and S2 [No Murmur] : no murmur heard [No Carotid Bruits] : no carotid bruits [No Abdominal Bruit] : a ~M bruit was not heard ~T in the abdomen [No Varicosities] : no varicosities [Pedal Pulses Present] : the pedal pulses are present [No Edema] : there was no peripheral edema [No Extremity Clubbing/Cyanosis] : no extremity clubbing/cyanosis [No Palpable Aorta] : no palpable aorta [Soft] : abdomen soft [Non Tender] : non-tender [Non-distended] : non-distended [No Masses] : no abdominal mass palpated [No HSM] : no HSM [Normal Bowel Sounds] : normal bowel sounds [Normal Posterior Cervical Nodes] : no posterior cervical lymphadenopathy [Normal Anterior Cervical Nodes] : no anterior cervical lymphadenopathy [No CVA Tenderness] : no CVA  tenderness [No Spinal Tenderness] : no spinal tenderness [No Joint Swelling] : no joint swelling [Grossly Normal Strength/Tone] : grossly normal strength/tone [No Rash] : no rash [Normal Gait] : normal gait [Coordination Grossly Intact] : coordination grossly intact [No Focal Deficits] : no focal deficits [Deep Tendon Reflexes (DTR)] : deep tendon reflexes were 2+ and symmetric [Speech Grossly Normal] : speech grossly normal [Normal Affect] : the affect was normal [Normal Insight/Judgement] : insight and judgment were intact [FreeTextEntry1] : 500 R 15 L 15   1000 R 15 L 15   2000 R 15 L 15   4000 R 35 L 15

## 2018-06-26 ENCOUNTER — APPOINTMENT (OUTPATIENT)
Dept: CARDIOLOGY | Facility: CLINIC | Age: 48
End: 2018-06-26
Payer: COMMERCIAL

## 2018-06-26 VITALS
HEART RATE: 84 BPM | WEIGHT: 201 LBS | RESPIRATION RATE: 18 BRPM | HEIGHT: 67 IN | BODY MASS INDEX: 31.55 KG/M2 | SYSTOLIC BLOOD PRESSURE: 122 MMHG | DIASTOLIC BLOOD PRESSURE: 78 MMHG

## 2018-06-26 LAB
ALBUMIN SERPL ELPH-MCNC: 4.9 G/DL
ALP BLD-CCNC: 52 U/L
ALT SERPL-CCNC: 43 U/L
ANION GAP SERPL CALC-SCNC: 17 MMOL/L
AST SERPL-CCNC: 27 U/L
BASOPHILS # BLD AUTO: 0.05 K/UL
BASOPHILS NFR BLD AUTO: 0.7 %
BILIRUB SERPL-MCNC: 0.4 MG/DL
BUN SERPL-MCNC: 19 MG/DL
CALCIUM SERPL-MCNC: 9.6 MG/DL
CHLORIDE SERPL-SCNC: 97 MMOL/L
CHOLEST SERPL-MCNC: 129 MG/DL
CHOLEST/HDLC SERPL: 3.2 RATIO
CO2 SERPL-SCNC: 24 MMOL/L
CREAT SERPL-MCNC: 1.43 MG/DL
EOSINOPHIL # BLD AUTO: 0.08 K/UL
EOSINOPHIL NFR BLD AUTO: 1.1 %
GLUCOSE SERPL-MCNC: 78 MG/DL
HCT VFR BLD CALC: 50.1 %
HDLC SERPL-MCNC: 40 MG/DL
HGB BLD-MCNC: 16.5 G/DL
IMM GRANULOCYTES NFR BLD AUTO: 0.3 %
LDLC SERPL CALC-MCNC: 58 MG/DL
LYMPHOCYTES # BLD AUTO: 2.09 K/UL
LYMPHOCYTES NFR BLD AUTO: 29 %
MAN DIFF?: NORMAL
MCHC RBC-ENTMCNC: 30.2 PG
MCHC RBC-ENTMCNC: 32.9 GM/DL
MCV RBC AUTO: 91.6 FL
MONOCYTES # BLD AUTO: 0.57 K/UL
MONOCYTES NFR BLD AUTO: 7.9 %
NEUTROPHILS # BLD AUTO: 4.4 K/UL
NEUTROPHILS NFR BLD AUTO: 61 %
PLATELET # BLD AUTO: 251 K/UL
POTASSIUM SERPL-SCNC: 4.3 MMOL/L
PROT SERPL-MCNC: 7.4 G/DL
RBC # BLD: 5.47 M/UL
RBC # FLD: 13.8 %
SODIUM SERPL-SCNC: 138 MMOL/L
T3FREE SERPL-MCNC: 3.84 PG/ML
T4 FREE SERPL-MCNC: 1.3 NG/DL
TRIGL SERPL-MCNC: 157 MG/DL
TSH SERPL-ACNC: 1.72 UIU/ML
WBC # FLD AUTO: 7.21 K/UL

## 2018-06-26 PROCEDURE — 93000 ELECTROCARDIOGRAM COMPLETE: CPT

## 2018-06-26 PROCEDURE — 99214 OFFICE O/P EST MOD 30 MIN: CPT

## 2018-06-30 ENCOUNTER — RX RENEWAL (OUTPATIENT)
Age: 48
End: 2018-06-30

## 2018-07-02 ENCOUNTER — APPOINTMENT (OUTPATIENT)
Dept: CARDIOLOGY | Facility: CLINIC | Age: 48
End: 2018-07-02
Payer: COMMERCIAL

## 2018-07-02 ENCOUNTER — OTHER (OUTPATIENT)
Age: 48
End: 2018-07-02

## 2018-07-02 PROCEDURE — 93015 CV STRESS TEST SUPVJ I&R: CPT

## 2018-07-05 ENCOUNTER — APPOINTMENT (OUTPATIENT)
Dept: GASTROENTEROLOGY | Facility: CLINIC | Age: 48
End: 2018-07-05

## 2018-07-12 ENCOUNTER — EMERGENCY (EMERGENCY)
Facility: HOSPITAL | Age: 48
LOS: 1 days | Discharge: HOME | End: 2018-07-12
Attending: EMERGENCY MEDICINE
Payer: COMMERCIAL

## 2018-07-12 VITALS
HEART RATE: 121 BPM | DIASTOLIC BLOOD PRESSURE: 76 MMHG | OXYGEN SATURATION: 100 % | TEMPERATURE: 98 F | RESPIRATION RATE: 16 BRPM | SYSTOLIC BLOOD PRESSURE: 139 MMHG | WEIGHT: 199.96 LBS

## 2018-07-12 VITALS
TEMPERATURE: 98 F | SYSTOLIC BLOOD PRESSURE: 123 MMHG | RESPIRATION RATE: 20 BRPM | OXYGEN SATURATION: 100 % | HEART RATE: 94 BPM | DIASTOLIC BLOOD PRESSURE: 71 MMHG

## 2018-07-12 LAB
ALBUMIN SERPL ELPH-MCNC: 4.6 G/DL — SIGNIFICANT CHANGE UP (ref 3.3–5)
ALP SERPL-CCNC: 54 U/L — SIGNIFICANT CHANGE UP (ref 40–120)
ALT FLD-CCNC: 68 U/L — HIGH (ref 10–45)
ANION GAP SERPL CALC-SCNC: 15 MMOL/L — SIGNIFICANT CHANGE UP (ref 5–17)
AST SERPL-CCNC: 45 U/L — HIGH (ref 10–40)
BASOPHILS # BLD AUTO: 0.1 K/UL — SIGNIFICANT CHANGE UP (ref 0–0.2)
BASOPHILS NFR BLD AUTO: 0.5 % — SIGNIFICANT CHANGE UP (ref 0–2)
BILIRUB SERPL-MCNC: 0.5 MG/DL — SIGNIFICANT CHANGE UP (ref 0.2–1.2)
BUN SERPL-MCNC: 20 MG/DL — SIGNIFICANT CHANGE UP (ref 7–23)
CALCIUM SERPL-MCNC: 9.6 MG/DL — SIGNIFICANT CHANGE UP (ref 8.4–10.5)
CHLORIDE SERPL-SCNC: 92 MMOL/L — LOW (ref 96–108)
CK MB BLD-MCNC: 1.3 % — SIGNIFICANT CHANGE UP (ref 0–3.5)
CK MB CFR SERPL CALC: 9.4 NG/ML — HIGH (ref 0–6.7)
CK SERPL-CCNC: 704 U/L — HIGH (ref 30–200)
CO2 SERPL-SCNC: 27 MMOL/L — SIGNIFICANT CHANGE UP (ref 22–31)
CREAT SERPL-MCNC: 1.56 MG/DL — HIGH (ref 0.5–1.3)
D DIMER BLD IA.RAPID-MCNC: <150 NG/ML DDU — SIGNIFICANT CHANGE UP
EOSINOPHIL # BLD AUTO: 0.1 K/UL — SIGNIFICANT CHANGE UP (ref 0–0.5)
EOSINOPHIL NFR BLD AUTO: 0.9 % — SIGNIFICANT CHANGE UP (ref 0–6)
GLUCOSE SERPL-MCNC: 122 MG/DL — HIGH (ref 70–99)
HCT VFR BLD CALC: 51.5 % — HIGH (ref 39–50)
HGB BLD-MCNC: 17.8 G/DL — HIGH (ref 13–17)
LYMPHOCYTES # BLD AUTO: 2.4 K/UL — SIGNIFICANT CHANGE UP (ref 1–3.3)
LYMPHOCYTES # BLD AUTO: 21.1 % — SIGNIFICANT CHANGE UP (ref 13–44)
MAGNESIUM SERPL-MCNC: 1.9 MG/DL — SIGNIFICANT CHANGE UP (ref 1.6–2.6)
MCHC RBC-ENTMCNC: 31.2 PG — SIGNIFICANT CHANGE UP (ref 27–34)
MCHC RBC-ENTMCNC: 34.5 GM/DL — SIGNIFICANT CHANGE UP (ref 32–36)
MCV RBC AUTO: 90.6 FL — SIGNIFICANT CHANGE UP (ref 80–100)
MONOCYTES # BLD AUTO: 1 K/UL — HIGH (ref 0–0.9)
MONOCYTES NFR BLD AUTO: 8.8 % — SIGNIFICANT CHANGE UP (ref 2–14)
NEUTROPHILS # BLD AUTO: 7.9 K/UL — HIGH (ref 1.8–7.4)
NEUTROPHILS NFR BLD AUTO: 68.7 % — SIGNIFICANT CHANGE UP (ref 43–77)
PHOSPHATE SERPL-MCNC: 2.5 MG/DL — SIGNIFICANT CHANGE UP (ref 2.5–4.5)
PLATELET # BLD AUTO: 272 K/UL — SIGNIFICANT CHANGE UP (ref 150–400)
POTASSIUM SERPL-MCNC: 3.7 MMOL/L — SIGNIFICANT CHANGE UP (ref 3.5–5.3)
POTASSIUM SERPL-SCNC: 3.7 MMOL/L — SIGNIFICANT CHANGE UP (ref 3.5–5.3)
PROT SERPL-MCNC: 7.5 G/DL — SIGNIFICANT CHANGE UP (ref 6–8.3)
RBC # BLD: 5.69 M/UL — SIGNIFICANT CHANGE UP (ref 4.2–5.8)
RBC # FLD: 12.2 % — SIGNIFICANT CHANGE UP (ref 10.3–14.5)
SODIUM SERPL-SCNC: 134 MMOL/L — LOW (ref 135–145)
TROPONIN T, HIGH SENSITIVITY RESULT: 15 NG/L — SIGNIFICANT CHANGE UP (ref 0–51)
TROPONIN T, HIGH SENSITIVITY RESULT: 17 NG/L — SIGNIFICANT CHANGE UP (ref 0–51)
WBC # BLD: 11.5 K/UL — HIGH (ref 3.8–10.5)
WBC # FLD AUTO: 11.5 K/UL — HIGH (ref 3.8–10.5)

## 2018-07-12 PROCEDURE — 99285 EMERGENCY DEPT VISIT HI MDM: CPT | Mod: 25

## 2018-07-12 PROCEDURE — 82550 ASSAY OF CK (CPK): CPT

## 2018-07-12 PROCEDURE — 84100 ASSAY OF PHOSPHORUS: CPT

## 2018-07-12 PROCEDURE — 80053 COMPREHEN METABOLIC PANEL: CPT

## 2018-07-12 PROCEDURE — 71046 X-RAY EXAM CHEST 2 VIEWS: CPT

## 2018-07-12 PROCEDURE — 85379 FIBRIN DEGRADATION QUANT: CPT

## 2018-07-12 PROCEDURE — 99284 EMERGENCY DEPT VISIT MOD MDM: CPT | Mod: 25

## 2018-07-12 PROCEDURE — 85027 COMPLETE CBC AUTOMATED: CPT

## 2018-07-12 PROCEDURE — 93005 ELECTROCARDIOGRAM TRACING: CPT

## 2018-07-12 PROCEDURE — 71046 X-RAY EXAM CHEST 2 VIEWS: CPT | Mod: 26

## 2018-07-12 PROCEDURE — 84484 ASSAY OF TROPONIN QUANT: CPT

## 2018-07-12 PROCEDURE — 83735 ASSAY OF MAGNESIUM: CPT

## 2018-07-12 PROCEDURE — 93010 ELECTROCARDIOGRAM REPORT: CPT

## 2018-07-12 PROCEDURE — 82553 CREATINE MB FRACTION: CPT

## 2018-07-12 RX ORDER — ASPIRIN/CALCIUM CARB/MAGNESIUM 324 MG
243 TABLET ORAL ONCE
Qty: 0 | Refills: 0 | Status: COMPLETED | OUTPATIENT
Start: 2018-07-12 | End: 2018-07-12

## 2018-07-12 RX ORDER — SODIUM CHLORIDE 9 MG/ML
1000 INJECTION INTRAMUSCULAR; INTRAVENOUS; SUBCUTANEOUS ONCE
Qty: 0 | Refills: 0 | Status: COMPLETED | OUTPATIENT
Start: 2018-07-12 | End: 2018-07-12

## 2018-07-12 RX ADMIN — Medication 243 MILLIGRAM(S): at 16:03

## 2018-07-12 RX ADMIN — SODIUM CHLORIDE 1000 MILLILITER(S): 9 INJECTION INTRAMUSCULAR; INTRAVENOUS; SUBCUTANEOUS at 15:44

## 2018-07-12 RX ADMIN — Medication 0.5 MILLIGRAM(S): at 15:44

## 2018-07-12 NOTE — ED PROVIDER NOTE - PROGRESS NOTE DETAILS
attending Che: private cardiologist Joo paged attending Che: spoke with Dr. Ge who reported exercise stress from last week was normal- pt exercised >12 min with no events. Plan for 2nd trop in ED. If no significant change plan for close outpatient follow-up with Dr. Ge as well as outpatient echo.

## 2018-07-12 NOTE — ED PROVIDER NOTE - MEDICAL DECISION MAKING DETAILS
46yo male with palpitations and sharp right-sided chest pain, will obtain labs, cxr, d-dimer to r/o PE, give fluids, home dose ativan, reassess. Likely CDU for echo. Roxanne Chandler DO

## 2018-07-12 NOTE — ED ADULT NURSE NOTE - OBJECTIVE STATEMENT
47 y.o. Male presents to the ED c/o palpitations. A&Ox3. Ambulatory. Pt reports going to the gym earlier today and on the drive to work for 3pm, pt started to feel palpitations, tachycardic and clammy. Pt states feeling something in his throat and L arm pain. Denies new exercises, SOB, V/D, urinary/bowel complications, fever/chills. Pt is in no current distress. Comfort and safety provided. Will continue to monitor. Dr. Bolton and Dr. Chandler at bedside for assessment. 47 y.o. Male presents to the ED c/o palpitations. A&Ox3. Ambulatory. Pt reports going to the gym earlier today and on the drive to work for 3pm, pt started to feel palpitations, tachycardic and clammy. Pt states feeling something in his throat and L arm pain. As per patient, he recently had a normal stress test 2 weeks ago. Last caffeine intake was yesterday. Denies new exercises, SOB, V/D, urinary/bowel complications, fever/chills. Pt is in no current distress. Comfort and safety provided. Will continue to monitor. Dr. Bolton and Dr. Chandler at bedside for assessment.

## 2018-07-12 NOTE — ED PROVIDER NOTE - OBJECTIVE STATEMENT
46yo male PMH HTN, hyperlipidemia, ADHD presenting with palpitations that started 2 hours ago after coming back from gym a/w left-sided chest pain that is sharp, intermittent, radiating to left shoulder and throat. Pain is non-exertional, non-positional. No shortness of breath. Patient had recent stress test 2 weeks ago which was normal. Strong family history of Coronary Artery Disease and myocardial infarction in his brother and his father. Denies EtOH, drugs, or smoking. No fevers or chills. No recent travel, leg swelling. Does take estrogen once per week. No drug use. 48yo male PMH HTN, hyperlipidemia, ADHD presenting with palpitations that started 2 hours ago after coming back from gym a/w left-sided chest pain that is sharp, intermittent, radiating to left shoulder and throat. Pain is non-exertional, non-positional. No shortness of breath. Patient had recent stress test 2 weeks ago which was normal. Strong family history of Coronary Artery Disease and myocardial infarction in his brother and his father. Denies EtOH, drugs, or smoking. No fevers or chills. No recent travel, leg swelling. Does take estrogen once per week. No drug use.    Cardiology: Gunner Ge

## 2018-07-12 NOTE — ED PROVIDER NOTE - PHYSICAL EXAMINATION
Gen: NAD  Head: NCAT  HEENT: PERRL, oral mucosa moist, normal conjunctiva, oropharynx clear without exudate or erythema  Lung: CTAB, no respiratory distress, no wheezing, rales, rhonchi  CV: No reproducible chest wall tenderness, normal s1/s2, tachycardic, no murmurs, Normal perfusion, pulses 2+ throughout  Abd: soft, NTND  MSK: No edema, no visible deformities, full range of motion in all 4 extremities  Neuro: No focal neurologic deficits  Skin: No rash   Psych: normal affect Gen: NAD  Head: NCAT  HEENT: PERRL, oral mucosa moist, normal conjunctiva, oropharynx clear without exudate or erythema  Lung: CTAB, no respiratory distress, no wheezing, rales, rhonchi  CV: No reproducible chest wall tenderness, normal s1/s2, tachycardic, no murmurs, Normal perfusion, pulses 2+ throughout  Abd: soft, NTND  MSK: +TTP over proximal biceps tendon on right, No edema, no visible deformities, full range of motion in all 4 extremities  Neuro: No focal neurologic deficits  Skin: No rash   Psych: normal affect

## 2018-07-12 NOTE — ED PROVIDER NOTE - ATTENDING CONTRIBUTION TO CARE
attending Che: 47yM h/o HTN, HLD, ADHD with recent exercise stress test 2 weeks ago p/w palpitations x 2 hours. Assoc L sided chest/shoulder pain. Began hours after exercising at gym but pt reports no symptoms during exertion. Denies SOB. Strong fam h/o CAD and MI. No PE risk factors. On exam, tachycardic, mildly anxious, lungs clear, +mild tenderness to L shoulder, no rashes. Will obtain ekg, place on tele, labs including d-dimer and trop, cxr, home dose ativan, discuss with private cardiologist and reassess

## 2018-07-18 ENCOUNTER — APPOINTMENT (OUTPATIENT)
Dept: CARDIOLOGY | Facility: CLINIC | Age: 48
End: 2018-07-18

## 2018-07-18 ENCOUNTER — APPOINTMENT (OUTPATIENT)
Dept: CARDIOLOGY | Facility: CLINIC | Age: 48
End: 2018-07-18
Payer: COMMERCIAL

## 2018-07-18 PROBLEM — K40.90 UNILATERAL INGUINAL HERNIA, WITHOUT OBSTRUCTION OR GANGRENE, NOT SPECIFIED AS RECURRENT: Chronic | Status: ACTIVE | Noted: 2018-04-18

## 2018-07-18 PROBLEM — I10 ESSENTIAL (PRIMARY) HYPERTENSION: Chronic | Status: ACTIVE | Noted: 2017-08-31

## 2018-07-18 PROBLEM — E03.9 HYPOTHYROIDISM, UNSPECIFIED: Chronic | Status: ACTIVE | Noted: 2018-04-18

## 2018-07-18 PROBLEM — E78.00 PURE HYPERCHOLESTEROLEMIA, UNSPECIFIED: Chronic | Status: ACTIVE | Noted: 2017-08-31

## 2018-07-18 PROCEDURE — 93880 EXTRACRANIAL BILAT STUDY: CPT

## 2018-07-18 PROCEDURE — 93306 TTE W/DOPPLER COMPLETE: CPT

## 2018-07-19 ENCOUNTER — RX RENEWAL (OUTPATIENT)
Age: 48
End: 2018-07-19

## 2018-07-26 ENCOUNTER — RX RENEWAL (OUTPATIENT)
Age: 48
End: 2018-07-26

## 2018-07-27 ENCOUNTER — APPOINTMENT (OUTPATIENT)
Dept: CARDIOLOGY | Facility: CLINIC | Age: 48
End: 2018-07-27

## 2018-07-27 ENCOUNTER — APPOINTMENT (OUTPATIENT)
Dept: FAMILY MEDICINE | Facility: CLINIC | Age: 48
End: 2018-07-27
Payer: COMMERCIAL

## 2018-07-27 VITALS
SYSTOLIC BLOOD PRESSURE: 122 MMHG | WEIGHT: 201 LBS | DIASTOLIC BLOOD PRESSURE: 84 MMHG | BODY MASS INDEX: 31.55 KG/M2 | HEIGHT: 67 IN

## 2018-07-27 LAB
BILIRUB UR QL STRIP: 0
CLARITY UR: CLEAR
COLLECTION METHOD: NORMAL
GLUCOSE UR-MCNC: 0
HCG UR QL: 0.2 EU/DL
HGB UR QL STRIP.AUTO: 0
KETONES UR-MCNC: 0
LEUKOCYTE ESTERASE UR QL STRIP: 0
NITRITE UR QL STRIP: 0
PH UR STRIP: 7
PROT UR STRIP-MCNC: 0
SP GR UR STRIP: 1.01

## 2018-07-27 PROCEDURE — 81002 URINALYSIS NONAUTO W/O SCOPE: CPT

## 2018-07-27 PROCEDURE — 99214 OFFICE O/P EST MOD 30 MIN: CPT | Mod: 25

## 2018-07-27 PROCEDURE — 36415 COLL VENOUS BLD VENIPUNCTURE: CPT

## 2018-07-27 NOTE — HISTORY OF PRESENT ILLNESS
[FreeTextEntry1] : Pt. went to get a abdominal ultrasound, ended up finding out he has a kidney stone on L kidney. Wants to talk about it. Also needs RX renew.\par Pt. also request RX for shoulder PT.

## 2018-07-27 NOTE — HEALTH RISK ASSESSMENT
[Patient reported colonoscopy was abnormal] : Patient reported colonoscopy was abnormal [ColonoscopyDate] : 07/18 [ColonoscopyComments] : Tubular adenoma to Sigmoid colon

## 2018-07-28 LAB
ALBUMIN SERPL ELPH-MCNC: 5 G/DL
ALP BLD-CCNC: 43 U/L
ALT SERPL-CCNC: 54 U/L
ANION GAP SERPL CALC-SCNC: 18 MMOL/L
AST SERPL-CCNC: 38 U/L
BASOPHILS # BLD AUTO: 0.03 K/UL
BASOPHILS NFR BLD AUTO: 0.4 %
BILIRUB SERPL-MCNC: 0.5 MG/DL
BUN SERPL-MCNC: 11 MG/DL
CALCIUM SERPL-MCNC: 9.9 MG/DL
CHLORIDE SERPL-SCNC: 96 MMOL/L
CHOLEST SERPL-MCNC: 110 MG/DL
CHOLEST/HDLC SERPL: 3.7 RATIO
CO2 SERPL-SCNC: 27 MMOL/L
CREAT SERPL-MCNC: 1.36 MG/DL
EOSINOPHIL # BLD AUTO: 0.08 K/UL
EOSINOPHIL NFR BLD AUTO: 1.1 %
GLUCOSE SERPL-MCNC: 96 MG/DL
HCT VFR BLD CALC: 54.5 %
HDLC SERPL-MCNC: 30 MG/DL
HGB BLD-MCNC: 17.6 G/DL
IMM GRANULOCYTES NFR BLD AUTO: 0.3 %
LDLC SERPL CALC-MCNC: 58 MG/DL
LYMPHOCYTES # BLD AUTO: 1.79 K/UL
LYMPHOCYTES NFR BLD AUTO: 25 %
MAN DIFF?: NORMAL
MCHC RBC-ENTMCNC: 30.4 PG
MCHC RBC-ENTMCNC: 32.3 GM/DL
MCV RBC AUTO: 94.1 FL
MONOCYTES # BLD AUTO: 0.76 K/UL
MONOCYTES NFR BLD AUTO: 10.6 %
NEUTROPHILS # BLD AUTO: 4.48 K/UL
NEUTROPHILS NFR BLD AUTO: 62.6 %
PLATELET # BLD AUTO: 268 K/UL
POTASSIUM SERPL-SCNC: 4.6 MMOL/L
PROT SERPL-MCNC: 7.6 G/DL
RBC # BLD: 5.79 M/UL
RBC # FLD: 13.7 %
SODIUM SERPL-SCNC: 141 MMOL/L
TRIGL SERPL-MCNC: 112 MG/DL
WBC # FLD AUTO: 7.16 K/UL

## 2018-07-30 ENCOUNTER — APPOINTMENT (OUTPATIENT)
Dept: CARDIOLOGY | Facility: CLINIC | Age: 48
End: 2018-07-30

## 2018-08-08 ENCOUNTER — APPOINTMENT (OUTPATIENT)
Dept: UROLOGY | Facility: CLINIC | Age: 48
End: 2018-08-08

## 2018-08-08 ENCOUNTER — APPOINTMENT (OUTPATIENT)
Dept: SURGERY | Facility: CLINIC | Age: 48
End: 2018-08-08

## 2018-08-14 ENCOUNTER — APPOINTMENT (OUTPATIENT)
Dept: DERMATOLOGY | Facility: CLINIC | Age: 48
End: 2018-08-14

## 2018-08-15 ENCOUNTER — APPOINTMENT (OUTPATIENT)
Dept: UROLOGY | Facility: CLINIC | Age: 48
End: 2018-08-15
Payer: COMMERCIAL

## 2018-08-15 ENCOUNTER — APPOINTMENT (OUTPATIENT)
Dept: CARDIOLOGY | Facility: CLINIC | Age: 48
End: 2018-08-15
Payer: COMMERCIAL

## 2018-08-15 VITALS
BODY MASS INDEX: 32.33 KG/M2 | RESPIRATION RATE: 18 BRPM | HEIGHT: 67 IN | DIASTOLIC BLOOD PRESSURE: 75 MMHG | SYSTOLIC BLOOD PRESSURE: 150 MMHG | WEIGHT: 206 LBS

## 2018-08-15 VITALS — HEART RATE: 108 BPM | DIASTOLIC BLOOD PRESSURE: 86 MMHG | SYSTOLIC BLOOD PRESSURE: 151 MMHG

## 2018-08-15 VITALS — SYSTOLIC BLOOD PRESSURE: 126 MMHG | HEART RATE: 90 BPM | DIASTOLIC BLOOD PRESSURE: 83 MMHG

## 2018-08-15 LAB
BILIRUB UR QL STRIP: NORMAL
CLARITY UR: CLEAR
COLLECTION METHOD: NORMAL
GLUCOSE UR-MCNC: NORMAL
HCG UR QL: 0.2 EU/DL
HGB UR QL STRIP.AUTO: NORMAL
KETONES UR-MCNC: NORMAL
LEUKOCYTE ESTERASE UR QL STRIP: NORMAL
NITRITE UR QL STRIP: NORMAL
PH UR STRIP: 5.5
PROT UR STRIP-MCNC: NORMAL
SP GR UR STRIP: 1

## 2018-08-15 PROCEDURE — 99214 OFFICE O/P EST MOD 30 MIN: CPT | Mod: 25

## 2018-08-15 PROCEDURE — 99214 OFFICE O/P EST MOD 30 MIN: CPT

## 2018-08-15 PROCEDURE — 81003 URINALYSIS AUTO W/O SCOPE: CPT | Mod: QW

## 2018-08-21 ENCOUNTER — APPOINTMENT (OUTPATIENT)
Dept: UROLOGY | Facility: CLINIC | Age: 48
End: 2018-08-21
Payer: COMMERCIAL

## 2018-08-21 PROCEDURE — 99214 OFFICE O/P EST MOD 30 MIN: CPT

## 2018-08-23 ENCOUNTER — APPOINTMENT (OUTPATIENT)
Dept: CT IMAGING | Facility: CLINIC | Age: 48
End: 2018-08-23

## 2018-08-24 ENCOUNTER — APPOINTMENT (OUTPATIENT)
Dept: CT IMAGING | Facility: CLINIC | Age: 48
End: 2018-08-24

## 2018-08-24 ENCOUNTER — APPOINTMENT (OUTPATIENT)
Dept: RADIOLOGY | Facility: CLINIC | Age: 48
End: 2018-08-24

## 2018-08-24 LAB
ALBUMIN SERPL ELPH-MCNC: 4.5 G/DL
ALP BLD-CCNC: 37 U/L
ALT SERPL-CCNC: 43 U/L
ANION GAP SERPL CALC-SCNC: 11 MMOL/L
AST SERPL-CCNC: 30 U/L
BASOPHILS # BLD AUTO: 0.04 K/UL
BASOPHILS NFR BLD AUTO: 0.7 %
BILIRUB SERPL-MCNC: 0.4 MG/DL
BUN SERPL-MCNC: 19 MG/DL
CALCIUM SERPL-MCNC: 10 MG/DL
CHLORIDE SERPL-SCNC: 99 MMOL/L
CO2 SERPL-SCNC: 28 MMOL/L
CREAT SERPL-MCNC: 1.3 MG/DL
EOSINOPHIL # BLD AUTO: 0.11 K/UL
EOSINOPHIL NFR BLD AUTO: 2.1 %
ESTRADIOL SERPL-MCNC: 44 PG/ML
FSH SERPL-MCNC: 0.1 IU/L
GLUCOSE SERPL-MCNC: 79 MG/DL
HCT VFR BLD CALC: 46.5 %
HGB BLD-MCNC: 15.5 G/DL
IMM GRANULOCYTES NFR BLD AUTO: 0.2 %
LH SERPL-ACNC: <0.1 IU/L
LYMPHOCYTES # BLD AUTO: 1.43 K/UL
LYMPHOCYTES NFR BLD AUTO: 26.7 %
MAN DIFF?: NORMAL
MCHC RBC-ENTMCNC: 29.6 PG
MCHC RBC-ENTMCNC: 33.3 GM/DL
MCV RBC AUTO: 88.7 FL
MONOCYTES # BLD AUTO: 0.53 K/UL
MONOCYTES NFR BLD AUTO: 9.9 %
NEUTROPHILS # BLD AUTO: 3.24 K/UL
NEUTROPHILS NFR BLD AUTO: 60.4 %
PLATELET # BLD AUTO: 257 K/UL
POTASSIUM SERPL-SCNC: 4.5 MMOL/L
PROT SERPL-MCNC: 6.9 G/DL
RBC # BLD: 5.24 M/UL
RBC # FLD: 12.9 %
SODIUM SERPL-SCNC: 138 MMOL/L
WBC # FLD AUTO: 5.36 K/UL

## 2018-08-27 LAB
TESTOST BND SERPL-MCNC: 26.7 PG/ML
TESTOST SERPL-MCNC: 596.9 NG/DL

## 2018-09-04 ENCOUNTER — LABORATORY RESULT (OUTPATIENT)
Age: 48
End: 2018-09-04

## 2018-09-04 ENCOUNTER — APPOINTMENT (OUTPATIENT)
Dept: FAMILY MEDICINE | Facility: CLINIC | Age: 48
End: 2018-09-04
Payer: COMMERCIAL

## 2018-09-04 VITALS — WEIGHT: 206 LBS | BODY MASS INDEX: 32.33 KG/M2 | HEIGHT: 67 IN

## 2018-09-04 VITALS — SYSTOLIC BLOOD PRESSURE: 130 MMHG | DIASTOLIC BLOOD PRESSURE: 78 MMHG

## 2018-09-04 PROCEDURE — 90471 IMMUNIZATION ADMIN: CPT

## 2018-09-04 PROCEDURE — 90715 TDAP VACCINE 7 YRS/> IM: CPT

## 2018-09-04 PROCEDURE — 99213 OFFICE O/P EST LOW 20 MIN: CPT | Mod: 25

## 2018-09-04 NOTE — HISTORY OF PRESENT ILLNESS
[FreeTextEntry1] : RX renewal for the adderal\par Doing well on ADD Meds. No side effects. No weight gain. No weight loss. No excessive jitteriness. No difficulties. Working on level of concentration\par \par Also needs repeat CBC since HCT was elevated in the hospital\par Had tick bite 5 weeks ago would like test for lyme\par Thyroid follow up

## 2018-09-05 LAB
B BURGDOR IGG+IGM SER QL IB: NORMAL
BASOPHILS # BLD AUTO: 0.02 K/UL
BASOPHILS NFR BLD AUTO: 0.5 %
EOSINOPHIL # BLD AUTO: 0.06 K/UL
EOSINOPHIL NFR BLD AUTO: 1.4 %
HCT VFR BLD CALC: 50.3 %
HGB BLD-MCNC: 17.1 G/DL
IMM GRANULOCYTES NFR BLD AUTO: 0.2 %
LYMPHOCYTES # BLD AUTO: 1.54 K/UL
LYMPHOCYTES NFR BLD AUTO: 36.3 %
MAN DIFF?: NORMAL
MCHC RBC-ENTMCNC: 30.7 PG
MCHC RBC-ENTMCNC: 34 GM/DL
MCV RBC AUTO: 90.3 FL
MONOCYTES # BLD AUTO: 0.22 K/UL
MONOCYTES NFR BLD AUTO: 5.2 %
NEUTROPHILS # BLD AUTO: 2.39 K/UL
NEUTROPHILS NFR BLD AUTO: 56.4 %
PLATELET # BLD AUTO: 250 K/UL
RBC # BLD: 5.57 M/UL
RBC # FLD: 13.2 %
T3FREE SERPL-MCNC: 3.38 PG/ML
T4 FREE SERPL-MCNC: 1.5 NG/DL
TSH SERPL-ACNC: 1.19 UIU/ML
WBC # FLD AUTO: 4.24 K/UL

## 2018-09-11 ENCOUNTER — RX RENEWAL (OUTPATIENT)
Age: 48
End: 2018-09-11

## 2018-09-20 ENCOUNTER — APPOINTMENT (OUTPATIENT)
Dept: UROLOGY | Facility: CLINIC | Age: 48
End: 2018-09-20

## 2018-09-25 ENCOUNTER — TRANSCRIPTION ENCOUNTER (OUTPATIENT)
Age: 48
End: 2018-09-25

## 2018-10-01 ENCOUNTER — RX RENEWAL (OUTPATIENT)
Age: 48
End: 2018-10-01

## 2018-10-23 ENCOUNTER — APPOINTMENT (OUTPATIENT)
Dept: UROLOGY | Facility: CLINIC | Age: 48
End: 2018-10-23
Payer: COMMERCIAL

## 2018-10-23 PROCEDURE — 99214 OFFICE O/P EST MOD 30 MIN: CPT

## 2018-10-27 ENCOUNTER — TRANSCRIPTION ENCOUNTER (OUTPATIENT)
Age: 48
End: 2018-10-27

## 2018-11-01 ENCOUNTER — RX RENEWAL (OUTPATIENT)
Age: 48
End: 2018-11-01

## 2018-11-21 ENCOUNTER — APPOINTMENT (OUTPATIENT)
Dept: SLEEP CENTER | Facility: CLINIC | Age: 48
End: 2018-11-21

## 2018-11-26 ENCOUNTER — TRANSCRIPTION ENCOUNTER (OUTPATIENT)
Age: 48
End: 2018-11-26

## 2018-11-26 ENCOUNTER — RX RENEWAL (OUTPATIENT)
Age: 48
End: 2018-11-26

## 2018-11-28 ENCOUNTER — APPOINTMENT (OUTPATIENT)
Dept: FAMILY MEDICINE | Facility: CLINIC | Age: 48
End: 2018-11-28
Payer: COMMERCIAL

## 2018-12-07 ENCOUNTER — EMERGENCY (EMERGENCY)
Facility: HOSPITAL | Age: 48
LOS: 1 days | End: 2018-12-07
Attending: EMERGENCY MEDICINE
Payer: COMMERCIAL

## 2018-12-07 VITALS
HEART RATE: 104 BPM | HEIGHT: 67 IN | DIASTOLIC BLOOD PRESSURE: 95 MMHG | OXYGEN SATURATION: 99 % | SYSTOLIC BLOOD PRESSURE: 147 MMHG | WEIGHT: 194.01 LBS | RESPIRATION RATE: 16 BRPM | TEMPERATURE: 98 F

## 2018-12-07 LAB
ALBUMIN SERPL ELPH-MCNC: 4.8 G/DL — SIGNIFICANT CHANGE UP (ref 3.3–5)
ALP SERPL-CCNC: 55 U/L — SIGNIFICANT CHANGE UP (ref 40–120)
ALT FLD-CCNC: 39 U/L — SIGNIFICANT CHANGE UP (ref 10–45)
ANION GAP SERPL CALC-SCNC: 12 MMOL/L — SIGNIFICANT CHANGE UP (ref 5–17)
APTT BLD: 29 SEC — SIGNIFICANT CHANGE UP (ref 27.5–36.3)
AST SERPL-CCNC: 33 U/L — SIGNIFICANT CHANGE UP (ref 10–40)
BASOPHILS # BLD AUTO: 0.1 K/UL — SIGNIFICANT CHANGE UP (ref 0–0.2)
BASOPHILS NFR BLD AUTO: 0.9 % — SIGNIFICANT CHANGE UP (ref 0–2)
BILIRUB SERPL-MCNC: 0.5 MG/DL — SIGNIFICANT CHANGE UP (ref 0.2–1.2)
BUN SERPL-MCNC: 19 MG/DL — SIGNIFICANT CHANGE UP (ref 7–23)
CALCIUM SERPL-MCNC: 10.4 MG/DL — SIGNIFICANT CHANGE UP (ref 8.4–10.5)
CHLORIDE SERPL-SCNC: 98 MMOL/L — SIGNIFICANT CHANGE UP (ref 96–108)
CO2 SERPL-SCNC: 23 MMOL/L — SIGNIFICANT CHANGE UP (ref 22–31)
CREAT SERPL-MCNC: 1.28 MG/DL — SIGNIFICANT CHANGE UP (ref 0.5–1.3)
EOSINOPHIL # BLD AUTO: 0.1 K/UL — SIGNIFICANT CHANGE UP (ref 0–0.5)
EOSINOPHIL NFR BLD AUTO: 1.2 % — SIGNIFICANT CHANGE UP (ref 0–6)
GLUCOSE SERPL-MCNC: 96 MG/DL — SIGNIFICANT CHANGE UP (ref 70–99)
HCT VFR BLD CALC: 49.2 % — SIGNIFICANT CHANGE UP (ref 39–50)
HGB BLD-MCNC: 17.3 G/DL — HIGH (ref 13–17)
INR BLD: 0.99 RATIO — SIGNIFICANT CHANGE UP (ref 0.88–1.16)
LYMPHOCYTES # BLD AUTO: 1.6 K/UL — SIGNIFICANT CHANGE UP (ref 1–3.3)
LYMPHOCYTES # BLD AUTO: 22.6 % — SIGNIFICANT CHANGE UP (ref 13–44)
MCHC RBC-ENTMCNC: 30.5 PG — SIGNIFICANT CHANGE UP (ref 27–34)
MCHC RBC-ENTMCNC: 35.2 GM/DL — SIGNIFICANT CHANGE UP (ref 32–36)
MCV RBC AUTO: 86.8 FL — SIGNIFICANT CHANGE UP (ref 80–100)
MONOCYTES # BLD AUTO: 0.6 K/UL — SIGNIFICANT CHANGE UP (ref 0–0.9)
MONOCYTES NFR BLD AUTO: 8.5 % — SIGNIFICANT CHANGE UP (ref 2–14)
NEUTROPHILS # BLD AUTO: 4.7 K/UL — SIGNIFICANT CHANGE UP (ref 1.8–7.4)
NEUTROPHILS NFR BLD AUTO: 66.8 % — SIGNIFICANT CHANGE UP (ref 43–77)
PLATELET # BLD AUTO: 248 K/UL — SIGNIFICANT CHANGE UP (ref 150–400)
POTASSIUM SERPL-MCNC: 3.7 MMOL/L — SIGNIFICANT CHANGE UP (ref 3.5–5.3)
POTASSIUM SERPL-SCNC: 3.7 MMOL/L — SIGNIFICANT CHANGE UP (ref 3.5–5.3)
PROT SERPL-MCNC: 7.2 G/DL — SIGNIFICANT CHANGE UP (ref 6–8.3)
PROTHROM AB SERPL-ACNC: 11.3 SEC — SIGNIFICANT CHANGE UP (ref 10–12.9)
RBC # BLD: 5.67 M/UL — SIGNIFICANT CHANGE UP (ref 4.2–5.8)
RBC # FLD: 12.9 % — SIGNIFICANT CHANGE UP (ref 10.3–14.5)
SODIUM SERPL-SCNC: 133 MMOL/L — LOW (ref 135–145)
TROPONIN T, HIGH SENSITIVITY RESULT: 11 NG/L — SIGNIFICANT CHANGE UP (ref 0–51)
WBC # BLD: 7 K/UL — SIGNIFICANT CHANGE UP (ref 3.8–10.5)
WBC # FLD AUTO: 7 K/UL — SIGNIFICANT CHANGE UP (ref 3.8–10.5)

## 2018-12-07 PROCEDURE — 84484 ASSAY OF TROPONIN QUANT: CPT

## 2018-12-07 PROCEDURE — 99284 EMERGENCY DEPT VISIT MOD MDM: CPT | Mod: 25

## 2018-12-07 PROCEDURE — 80053 COMPREHEN METABOLIC PANEL: CPT

## 2018-12-07 PROCEDURE — 93010 ELECTROCARDIOGRAM REPORT: CPT

## 2018-12-07 PROCEDURE — 99281 EMR DPT VST MAYX REQ PHY/QHP: CPT

## 2018-12-07 PROCEDURE — 93005 ELECTROCARDIOGRAM TRACING: CPT

## 2018-12-07 PROCEDURE — 85027 COMPLETE CBC AUTOMATED: CPT

## 2018-12-07 PROCEDURE — 85610 PROTHROMBIN TIME: CPT

## 2018-12-07 PROCEDURE — 82962 GLUCOSE BLOOD TEST: CPT

## 2018-12-07 PROCEDURE — 70450 CT HEAD/BRAIN W/O DYE: CPT

## 2018-12-07 PROCEDURE — 70450 CT HEAD/BRAIN W/O DYE: CPT | Mod: 26

## 2018-12-07 PROCEDURE — 36415 COLL VENOUS BLD VENIPUNCTURE: CPT

## 2018-12-07 PROCEDURE — 85730 THROMBOPLASTIN TIME PARTIAL: CPT

## 2018-12-07 RX ORDER — ALPRAZOLAM 0.25 MG
0.5 TABLET ORAL ONCE
Qty: 0 | Refills: 0 | Status: DISCONTINUED | OUTPATIENT
Start: 2018-12-07 | End: 2018-12-07

## 2018-12-07 RX ADMIN — Medication 0.5 MILLIGRAM(S): at 22:53

## 2018-12-07 NOTE — CONSULT NOTE ADULT - ATTENDING COMMENTS
I performed a history and physical examination of the patient and discussed the management of the patient with the resident. I reviewed the resident's note and agree with the documented findings and plan of care with the following additions/exceptions.    dos 12/8/18    says he still has funny feeling in forehead but otherwise feels well. denies hx of staring spells, gtc, or waking up sore with tongue biting. denies hyperventiliating or being anxious at the time of the event.    on exam eomi, face symmetric, no dysarthria, no drift, ffm intact, gait steady    if mri/a neg recommend f/u with general neurology as an outpatient and consider eeg. not clearly a partial seizure but with the gi upset during the episode it makes me wonder about temporal lobe seizure, but if imaging normal wouldn't start AED just for this one unclear episode.

## 2018-12-07 NOTE — CONSULT NOTE ADULT - SUBJECTIVE AND OBJECTIVE BOX
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 Shortly after he noted blurred vision is slightly improved but not completely and double vision has resolved.    ROS - He has no headache, blackened visual fields, no fevers, chills, nausea, vomiting,  no chest pain, heart palpitations.    Family history - significant malignancy history, mother father brother and daughter. 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  Eyes - sharp optic discs to my eyes, well visualized blood vessells  SKIN: Skin normal color, texture and turgor with no lesions or eruptions.    Neurological Exam:  Mental Status: Orientated to self, date and place.  Attention intact.  No dysarthria, aphasia or neglect.  Knowledge intact.  Registration intact.  Short and long term memory grossly intact.      Cranial Nerves: PERRL, EOMI, VFF, no nystagmus or diplopia.  No APD. Optic discs appear sharp. CN V1-3 intact to light touch and pinprick.  No facial asymmetry.  Hearing intact to finger rub bilaterally.  Tongue, uvula and palate midline.  Sternocleidomastoid and Trapezius intact bilaterally.    Motor:   Tone: normal.                  Strength:     [] Upper extremity                      Delt       Bicep    Tricep                                                  R         5/5        5/5        5/5       5/5                                               L          5/5        5/5        5/5       5/5  [] Lower extremity                       HF          KE          KF        DF         PF                                               R        5/5 5/5 5/5 5/5 5/5                                               L         5/5 5/5 5/5 5/5 5/5  Pronator drift: none                 Dysmetria: None to finger-nose-finger or heel-shin-heel  No truncal ataxia.    Tremor: No resting, postural or action tremor.  No myoclonus.  Sensation: intact to light touch, pinprick  Deep Tendon Reflexes: 1+ bilateral biceps, triceps, brachioradialis, knee and ankle  Toes flexor bilaterally  Gait: normal and stable.      Labs                        17.3   7.0   )-----------( 248      ( 07 Dec 2018 18:27 )             49.2   12-07    133<L>  |  98  |  19  ----------------------------<  96  3.7   |  23  |  1.28    Ca    10.4      07 Dec 2018 18:27    TPro  7.2  /  Alb  4.8  /  TBili  0.5  /  DBili  x   /  AST  33  /  ALT  39  /  AlkPhos  55  12-07  PT/INR - ( 07 Dec 2018 18:27 )   PT: 11.3 sec;   INR: 0.99 ratio         PTT - ( 07 Dec 2018 18:27 )  PTT:29.0 secLIVER FUNCTIONS - ( 07 Dec 2018 18:27 )  Alb: 4.8 g/dL / Pro: 7.2 g/dL / ALK PHOS: 55 U/L / ALT: 39 U/L / AST: 33 U/L / GGT: x           < from: CT Head No Cont (12.07.18 @ 18:30) >    No prior brain imaging is available for comparison.    The fourth, third and lateral ventricles are normal size and position.   There is no hemorrhage, mass or shift of the midline structures. There is   normal gray white matter differentiation. Bone window examination is   unremarkable.    IMPRESSION: Unremarkable noncontrast CT of the brain.    < end of copied text >

## 2018-12-07 NOTE — ED PROVIDER NOTE - ATTENDING CONTRIBUTION TO CARE
Attending MD Del Cid:   I personally have seen and examined this patient.  Physician assistant note reviewed and agree on plan of care and except where noted.  See below for details.     Seen in MW19    48M with PMH including HTN, HLD, R IHR on HCTZ, Losartan presents to the ED with sudden onset blurred and double vision with "unsteady feeling".  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  Reports felt weak, "weird", double vision, dizziness, blurry vision.  Reports bilateral double vision has resolved but blurred vision persists.  Denies previous episodes.  Denies headache, denies loss of vision.  Denies trauma.  Denies fevers, chills.  Denies chest pain, shortness of breath, palpitations. Denies abdominal pain, nausea, vomiting, diarrhea, blood in stools.  Denies allergies, denies tobacco, drugs.  On exam, NAD, CN 2-12 grossly intact, OD 20/40, OS 20/40, head NCAT, PERRL, FROM at neck, no tenderness to midline palpation, no stepoffs along length of spine, lungs CTAB with good inspiratory effort, +S1S2, no m/r/g, abdomen soft with +BS, NT, ND, no CVAT, moving all extremities with 5/5 strength bilateral upper and lower extremities, good and equal  strength bilaterally, no calf tenderness, swelling, erythema or warmth; A/P: 48M with sudden onset blurry and double vision, will obtain CT head, neuro and ophtho consult, will obtain labs, EKG

## 2018-12-07 NOTE — ED PROVIDER NOTE - NSFOLLOWUPINSTRUCTIONS_ED_ALL_ED_FT
1) Please follow-up with your primary care doctor in the next 5-7 days.  Please call tomorrow for an appointment.  If you cannot follow-up with your primary care doctor please return to the ED for any urgent issues.  2) You were given a copy of the tests performed today.  Please bring the results with you and review them with your primary care doctor.  3) If you have any worsening of symptoms or any other concerns please return to the ED immediately.  4) Please continue taking your home medications as directed.   Please take Tylenol (Acetaminophen) 650 mg every 4-6 hours as needed for pain. Please do not exceed more than 4,000mg of Tylenol in a day

## 2018-12-07 NOTE — ED PROVIDER NOTE - CARE PROVIDER_API CALL
Syed Ma), Clinical Neurophysiology; Neurology  [address] [place] [address]  Phone: [phone]  Fax: [phone]

## 2018-12-07 NOTE — CONSULT NOTE ADULT - SUBJECTIVE AND OBJECTIVE BOX
[facility] Ophthalmology Consult Note    HPI: 49 yo male with pmh of htn, hld, presenting with sudden onset of binocular diplopia vision with an "unsteady feeling" approx  6 pm this evening. 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 Double vision lasted approximately 10 minutes.  He has no headache, blackened visual fields, no fevers, chills, nausea, vomiting,  no chest pain, heart palpitations.      PMH: HTN, HLD  Meds: Home Medications:  aspirin 81 mg oral delayed release capsule:  orally  (23 Apr 2018 09:22)  atorvastatin 10 mg oral tablet: 1 tab(s) orally once a day (23 Apr 2018 09:22)  losartan 100 mg oral tablet: 1 tab(s) orally once a day (23 Apr 2018 09:22)  Pepcid 20 mg oral tablet: x 2 pre-op as directed (23 Apr 2018 09:22)  Synthroid 50 mcg (0.05 mg) oral tablet: 1 tab(s) orally once a day (23 Apr 2018 09:22)    POcHx (including surgeries/lasers/trauma):  Prescribed glasses in the past, only wears occasionally to read fine print.   Drops: None  FamHx: None  Social Hx: None  Allergies: NKDA    ROS:  General (neg), Vision (per HPI), Head and Neck (neg), Pulm (neg), CV (neg), GI (neg),  (neg), Musculoskeletal (neg), Skin/Integ (neg), Neuro (neg), Endocrine (neg), Heme (neg), All/Immuno (neg)    Mood and Affect Appropriate ( x ),  Oriented to Time, Place, and Person x 3 ( x )    Ophthalmology Exam    Visual acuity (sc): 20/40, PH20/20- OU  Pupils: PERRL OU, no APD  Ttono: 13 OU  Extraocular movements (EOMs): Full OU, no pain, no diplopia   Confrontational Visual Field (CVF):  Full OU  Color Plates: 12/12 OU    Pen Light Exam (PLE)  External:  Flat OU  Lids/Lashes/Lacrimal Ducts: Flat OU    Sclera/Conjunctiva:  W+Q OU  Cornea: Cl OU  Anterior Chamber: D+F OU  Iris:  Flat OU  Lens:  Cl OU    Fundus Exam: dilated with 1% tropicamide and 2.5% phenylephrine  Approval obtained from primary team for dilation  Patient aware that pupils can remained dilated for at least 4-6 hours  Exam performed with 20D lens    Vitreous: wnl OU  Disc, cup/disc: sharp and pink, 0.4 OU  Macula:  wnl OU  Vessels:  wnl OU  Periphery: wnl OU    Diagnostic Testing:  CT brain: Unremarkable noncontrast CT of the brain.   MRI: Pending    A/P: 49 y/o m experiencing short (10min) episode of double vision, with subjectively persistent blurry vision. Excellent BCVA, EOMS full and dilated fundus exam within normal limits.   Agree with primary team's plan for MRI brain to rule out intracranial pathology.   Rest of management per primary and ED team.     Follow-Up:  Patient should follow up his/her ophthalmologist or in the [facility] routinely (1-2 weeks) of discharge.  [address]  [place] [address]  [phone]

## 2018-12-07 NOTE — ED PROVIDER NOTE - OBJECTIVE STATEMENT
49 yo male with pmh of htn, hld, presenting with sudden onset of bilateral blurred vision approx 15 minutes ago. 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 He has no headache, double vision, blackened visual fields, no fevers, chills, nausea, vomiting, dizziness, no chest pain, heart palpitations. 47 yo male with pmh of htn, hld, presenting with sudden onset of bilateral blurred and double vision with an "unsteady feeling" approx 15 minutes ago. 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  He has no headache, blackened visual fields, no fevers, chills, nausea, vomiting,  no chest pain, heart palpitations.

## 2018-12-07 NOTE — ED PROVIDER NOTE - PROGRESS NOTE DETAILS
Attending MD Del Cid: CT completed, discussed with Dr Bull, no acute findings noted Attending MD Del Cid: Neuro bedside Attending MD Del Cid: Evaluated by neuro, will speak to attending, will await recs.  Recommended ophtho eval.  Will await. Attending MD Del Cid: Evaluated by neuro, will speak to attending, will await recs.  Patient with extensive history of malignancy in family. Recommended ophtho eval.  Will await. Attending MD Del Cid: Neuro recommends MRI brain without contrast, MRA neck with contrast, ophtho evaluation.  Ophtho contacted, will evaluate patient.  CDU evaluated patient, will go to CDU. Attending MD Del Cid: Patient does not wish to go to CDU because unsure insurance will cover it.  Will await MRs and ophtho. Attending MD Del Cid: Seen by aubrey, dilating Attending MD Del Cid: Nonactionable ophthalmology evaluation ZR Sign out to do MRI IN AM ,PT couldt tolerate MRI ,and want to follow up with neuro out patient ,feels much better

## 2018-12-07 NOTE — ED ADULT NURSE NOTE - OBJECTIVE STATEMENT
47 yo M Aaox4 PMH HLD, HTN, C/o of sudden dizziness and vision changes occurred approx. 25-30 minutes. 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 Pt Denies CP weakness or SOB. Speech is Clear. Pt has symmetrical strength and sensation with   no paresthesia. Pt pupils are PERRL, and gag reflex intact. Behaviour appropriate to situation. Large bore IV placed. Labs drawn and sent. Pt sent to CT,   EKG pending.

## 2018-12-07 NOTE — CONSULT NOTE ADULT - ASSESSMENT
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 Shortly after he noted blurred vision is slightly improved but not completely and double vision has resolved. During this event pt was normotensive and afebrile. No prior events similar to this, no recent change in lifestyle, medications or recent infections. Neurological exam is in tact, no focal deficits. Ct head is unremarkable.    Impression - Would rule out underlying mass lesion / partial focal seizure. Pt has a significant family history of malignancy, Most recent colonoscopy was unremarkable.     Recommendations:  Would get an MRI with zach under epilepsy protocol (ordered)  MRA head without zach (ordered)  MRA neck with zach (ordered)  rEEG can be done as an outpatient  Consider opthalmology consult

## 2018-12-08 VITALS
SYSTOLIC BLOOD PRESSURE: 118 MMHG | HEART RATE: 75 BPM | TEMPERATURE: 98 F | DIASTOLIC BLOOD PRESSURE: 76 MMHG | RESPIRATION RATE: 18 BRPM | OXYGEN SATURATION: 99 %

## 2018-12-08 RX ORDER — ALPRAZOLAM 0.25 MG
0.5 TABLET ORAL ONCE
Qty: 0 | Refills: 0 | Status: COMPLETED | OUTPATIENT
Start: 2018-12-08 | End: 2018-12-08

## 2018-12-08 NOTE — ED ADULT NURSE REASSESSMENT NOTE - NS ED NURSE REASSESS COMMENT FT1
Patient resting in stretcher bed, no acute distress. Patient free from harm, denies pain. VSS, awaiting MRI in morning.

## 2018-12-12 ENCOUNTER — APPOINTMENT (OUTPATIENT)
Dept: FAMILY MEDICINE | Facility: CLINIC | Age: 48
End: 2018-12-12
Payer: COMMERCIAL

## 2018-12-12 VITALS
BODY MASS INDEX: 32.33 KG/M2 | HEIGHT: 67 IN | DIASTOLIC BLOOD PRESSURE: 84 MMHG | SYSTOLIC BLOOD PRESSURE: 112 MMHG | WEIGHT: 206 LBS

## 2018-12-12 DIAGNOSIS — A60.00 HERPESVIRAL INFECTION OF UROGENITAL SYSTEM, UNSPECIFIED: ICD-10-CM

## 2018-12-12 PROCEDURE — 36415 COLL VENOUS BLD VENIPUNCTURE: CPT

## 2018-12-12 PROCEDURE — 99214 OFFICE O/P EST MOD 30 MIN: CPT | Mod: 25

## 2018-12-12 NOTE — REVIEW OF SYSTEMS
[Negative] : Heme/Lymph [Headache] : no headache [Dizziness] : no dizziness [Fainting] : no fainting [Confusion] : no confusion [Unsteady Walk] : no ataxia

## 2018-12-12 NOTE — HISTORY OF PRESENT ILLNESS
[FreeTextEntry1] : RX renew, also wants bwk for fasting  BW.                                                                                                                                                     , thyroid and prolactin. was in ED for diplopia and blurred vision. Recommended MRI with gadolinium but unable to handle the closed MRI. Requests open MRI. Seen by neurology and ophthalmomogy.

## 2018-12-13 LAB
CHOLEST SERPL-MCNC: 137 MG/DL
CHOLEST/HDLC SERPL: 3.4 RATIO
HDLC SERPL-MCNC: 40 MG/DL
LDLC SERPL CALC-MCNC: 81 MG/DL
PROLACTIN SERPL-MCNC: 10 NG/ML
T3FREE SERPL-MCNC: 3.85 PG/ML
T4 FREE SERPL-MCNC: 1.5 NG/DL
TRIGL SERPL-MCNC: 79 MG/DL
TSH SERPL-ACNC: 1.47 UIU/ML

## 2018-12-17 ENCOUNTER — RX RENEWAL (OUTPATIENT)
Age: 48
End: 2018-12-17

## 2018-12-17 LAB
ALBUMIN SERPL ELPH-MCNC: 4.9 G/DL
ALP BLD-CCNC: 58 U/L
ALT SERPL-CCNC: 68 U/L
ANION GAP SERPL CALC-SCNC: 15 MMOL/L
AST SERPL-CCNC: 37 U/L
BILIRUB SERPL-MCNC: 0.4 MG/DL
BUN SERPL-MCNC: 19 MG/DL
CALCIUM SERPL-MCNC: 10 MG/DL
CHLORIDE SERPL-SCNC: 100 MMOL/L
CO2 SERPL-SCNC: 23 MMOL/L
CREAT SERPL-MCNC: 1.46 MG/DL
GLUCOSE SERPL-MCNC: 87 MG/DL
POTASSIUM SERPL-SCNC: 4.4 MMOL/L
PROT SERPL-MCNC: 7.1 G/DL
SODIUM SERPL-SCNC: 138 MMOL/L

## 2019-01-10 ENCOUNTER — CLINICAL ADVICE (OUTPATIENT)
Age: 49
End: 2019-01-10

## 2019-01-15 ENCOUNTER — RX RENEWAL (OUTPATIENT)
Age: 49
End: 2019-01-15

## 2019-01-22 ENCOUNTER — RX RENEWAL (OUTPATIENT)
Age: 49
End: 2019-01-22

## 2019-01-25 ENCOUNTER — RX RENEWAL (OUTPATIENT)
Age: 49
End: 2019-01-25

## 2019-01-31 ENCOUNTER — RX RENEWAL (OUTPATIENT)
Age: 49
End: 2019-01-31

## 2019-02-04 ENCOUNTER — APPOINTMENT (OUTPATIENT)
Dept: CARDIOLOGY | Facility: CLINIC | Age: 49
End: 2019-02-04

## 2019-02-08 ENCOUNTER — TRANSCRIPTION ENCOUNTER (OUTPATIENT)
Age: 49
End: 2019-02-08

## 2019-02-11 ENCOUNTER — APPOINTMENT (OUTPATIENT)
Dept: FAMILY MEDICINE | Facility: CLINIC | Age: 49
End: 2019-02-11
Payer: COMMERCIAL

## 2019-02-11 VITALS
WEIGHT: 206 LBS | DIASTOLIC BLOOD PRESSURE: 80 MMHG | HEIGHT: 67 IN | SYSTOLIC BLOOD PRESSURE: 138 MMHG | BODY MASS INDEX: 32.33 KG/M2

## 2019-02-11 PROCEDURE — 99213 OFFICE O/P EST LOW 20 MIN: CPT | Mod: 25

## 2019-02-11 PROCEDURE — 36415 COLL VENOUS BLD VENIPUNCTURE: CPT

## 2019-02-11 NOTE — HEALTH RISK ASSESSMENT
[] : No [No falls in past year] : Patient reported no falls in the past year [1] : 2) Feeling down, depressed, or hopeless for several days (1) [de-identified] : occasional [[id number]] : 8

## 2019-02-11 NOTE — REVIEW OF SYSTEMS
[Suicidal] : not suicidal [Insomnia] : insomnia [Anxiety] : anxiety [Depression] : depression [Negative] : Heme/Lymph

## 2019-02-11 NOTE — HISTORY OF PRESENT ILLNESS
[FreeTextEntry8] : Fill out forms, Want FMLA form filled out in case of panic attacks at work and needs to take time off intermittently\par Symptoms of depression getting a little worse Going for counseling once a week\par  pt. also wants testosterone checked, last time it was cancelled by the lab.

## 2019-02-11 NOTE — PLAN
[FreeTextEntry1] : FMLA form completed\par Add Lexapro\par Consider change xanax to Klonopin if needed\par Call in 1 week

## 2019-02-11 NOTE — PHYSICAL EXAM
[Normal] : normoactive bowel sounds, soft and nontender, no hepatosplenomegaly or masses appreciated [de-identified] : normal affect at this time

## 2019-02-12 ENCOUNTER — TRANSCRIPTION ENCOUNTER (OUTPATIENT)
Age: 49
End: 2019-02-12

## 2019-02-15 ENCOUNTER — RX RENEWAL (OUTPATIENT)
Age: 49
End: 2019-02-15

## 2019-02-18 LAB
TESTOST BND SERPL-MCNC: 35.2 PG/ML
TESTOST SERPL-MCNC: 725.6 NG/DL

## 2019-02-19 ENCOUNTER — RX RENEWAL (OUTPATIENT)
Age: 49
End: 2019-02-19

## 2019-02-19 ENCOUNTER — OUTPATIENT (OUTPATIENT)
Dept: OUTPATIENT SERVICES | Facility: HOSPITAL | Age: 49
LOS: 1 days | End: 2019-02-19
Payer: COMMERCIAL

## 2019-02-19 DIAGNOSIS — Z00.00 ENCOUNTER FOR GENERAL ADULT MEDICAL EXAMINATION WITHOUT ABNORMAL FINDINGS: ICD-10-CM

## 2019-02-19 PROCEDURE — 71045 X-RAY EXAM CHEST 1 VIEW: CPT | Mod: 26

## 2019-02-19 PROCEDURE — 71045 X-RAY EXAM CHEST 1 VIEW: CPT

## 2019-02-20 ENCOUNTER — APPOINTMENT (OUTPATIENT)
Dept: CARDIOLOGY | Facility: CLINIC | Age: 49
End: 2019-02-20

## 2019-03-02 ENCOUNTER — TRANSCRIPTION ENCOUNTER (OUTPATIENT)
Age: 49
End: 2019-03-02

## 2019-03-05 ENCOUNTER — APPOINTMENT (OUTPATIENT)
Dept: UROLOGY | Facility: CLINIC | Age: 49
End: 2019-03-05

## 2019-03-07 ENCOUNTER — APPOINTMENT (OUTPATIENT)
Dept: FAMILY MEDICINE | Facility: CLINIC | Age: 49
End: 2019-03-07
Payer: COMMERCIAL

## 2019-03-07 VITALS
HEIGHT: 67 IN | SYSTOLIC BLOOD PRESSURE: 140 MMHG | BODY MASS INDEX: 32.33 KG/M2 | DIASTOLIC BLOOD PRESSURE: 80 MMHG | WEIGHT: 206 LBS

## 2019-03-07 PROCEDURE — 99214 OFFICE O/P EST MOD 30 MIN: CPT

## 2019-03-07 RX ORDER — ALPRAZOLAM 0.25 MG/1
0.25 TABLET ORAL
Qty: 60 | Refills: 0 | Status: DISCONTINUED | COMMUNITY
Start: 2018-04-13 | End: 2019-03-07

## 2019-03-07 NOTE — HISTORY OF PRESENT ILLNESS
[FreeTextEntry1] : Pt. is here to discuss medications. Also, has been having high BP. Pt. had some chest pain, went to the hospital, had an EKG and it was normal. [de-identified] : Going through increased stress with work and liyae is going through Etoh withdrawal and going into detox. Needs to care for her kids.

## 2019-03-11 ENCOUNTER — APPOINTMENT (OUTPATIENT)
Dept: FAMILY MEDICINE | Facility: CLINIC | Age: 49
End: 2019-03-11

## 2019-03-21 ENCOUNTER — APPOINTMENT (OUTPATIENT)
Dept: UROLOGY | Facility: CLINIC | Age: 49
End: 2019-03-21
Payer: COMMERCIAL

## 2019-03-21 PROCEDURE — 99214 OFFICE O/P EST MOD 30 MIN: CPT

## 2019-03-21 NOTE — ASSESSMENT
[FreeTextEntry1] : This pleasant gentleman presents with a history of low testosterone on replacement therapy here today for follow up. he states he still feels tired. Recent testosterone was 725 ng/dl. Will repeat blood tests today. I will see him back in 3 months in followup.

## 2019-03-21 NOTE — HISTORY OF PRESENT ILLNESS
[FreeTextEntry1] : 47y M with low testosterone here today to discuss blood test. Patient is currently injecting 100 mg of testosterone cypionate weekly. He reports no difficulties. He does remain concerned about his energy level is still low. The patient reports that his erections and libido is still good. However he works a lot at his 2 jobs and does not get good sleep.\par \par PMH: Patient is a 51-year-old gentleman who presents with the chief complaint of testosterone for evaluation. I reviewed the questionnaire he had completed with him in detail. From the age of [age] the patient had been abusing androgens. He then had 2 children with her partner who he subsequently . For the past year and a half he's been placed on testosterone and is taking 150 mg a week injectable testosterone cyprionate. He has no known drug allergies.  His past medical history demonstrates hypertension and hypercholesterolemia.  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  He does smoke and drinks only socially.  He has no known drug allergies.  His review of systems is non-contributory. His family history is not significant.\par

## 2019-03-28 LAB
25(OH)D3 SERPL-MCNC: 20.7 NG/ML
BASOPHILS # BLD AUTO: 0.06 K/UL
BASOPHILS NFR BLD AUTO: 0.9 %
EOSINOPHIL # BLD AUTO: 0.07 K/UL
EOSINOPHIL NFR BLD AUTO: 1.1 %
ESTRADIOL SERPL-MCNC: 11 PG/ML
FSH SERPL-MCNC: 1.2 IU/L
HCT VFR BLD CALC: 54 %
HGB BLD-MCNC: 17.5 G/DL
IMM GRANULOCYTES NFR BLD AUTO: 0.3 %
LH SERPL-ACNC: <0.3 IU/L
LYMPHOCYTES # BLD AUTO: 1.38 K/UL
LYMPHOCYTES NFR BLD AUTO: 20.7 %
MAN DIFF?: NORMAL
MCHC RBC-ENTMCNC: 28.7 PG
MCHC RBC-ENTMCNC: 32.4 GM/DL
MCV RBC AUTO: 88.7 FL
MONOCYTES # BLD AUTO: 0.52 K/UL
MONOCYTES NFR BLD AUTO: 7.8 %
NEUTROPHILS # BLD AUTO: 4.61 K/UL
NEUTROPHILS NFR BLD AUTO: 69.2 %
PLATELET # BLD AUTO: 261 K/UL
PSA SERPL-MCNC: 1.46 NG/ML
RBC # BLD: 6.09 M/UL
RBC # FLD: 13.3 %
TESTOST BND SERPL-MCNC: 3.9 PG/ML
TESTOST SERPL-MCNC: 135.9 NG/DL
WBC # FLD AUTO: 6.66 K/UL

## 2019-04-03 ENCOUNTER — APPOINTMENT (OUTPATIENT)
Dept: FAMILY MEDICINE | Facility: CLINIC | Age: 49
End: 2019-04-03

## 2019-04-04 ENCOUNTER — TRANSCRIPTION ENCOUNTER (OUTPATIENT)
Age: 49
End: 2019-04-04

## 2019-04-04 ENCOUNTER — RX RENEWAL (OUTPATIENT)
Age: 49
End: 2019-04-04

## 2019-04-05 ENCOUNTER — RX RENEWAL (OUTPATIENT)
Age: 49
End: 2019-04-05

## 2019-04-12 ENCOUNTER — RX RENEWAL (OUTPATIENT)
Age: 49
End: 2019-04-12

## 2019-04-24 ENCOUNTER — OUTPATIENT (OUTPATIENT)
Dept: OUTPATIENT SERVICES | Facility: HOSPITAL | Age: 49
LOS: 1 days | Discharge: HOME | End: 2019-04-24

## 2019-04-24 ENCOUNTER — APPOINTMENT (OUTPATIENT)
Dept: FAMILY MEDICINE | Facility: CLINIC | Age: 49
End: 2019-04-24
Payer: COMMERCIAL

## 2019-04-25 ENCOUNTER — APPOINTMENT (OUTPATIENT)
Dept: FAMILY MEDICINE | Facility: CLINIC | Age: 49
End: 2019-04-25
Payer: COMMERCIAL

## 2019-04-25 VITALS
SYSTOLIC BLOOD PRESSURE: 110 MMHG | BODY MASS INDEX: 31.08 KG/M2 | HEIGHT: 67 IN | WEIGHT: 198 LBS | DIASTOLIC BLOOD PRESSURE: 78 MMHG

## 2019-04-25 PROCEDURE — 99214 OFFICE O/P EST MOD 30 MIN: CPT | Mod: 25

## 2019-04-25 PROCEDURE — 36415 COLL VENOUS BLD VENIPUNCTURE: CPT

## 2019-04-25 RX ORDER — BALOXAVIR MARBOXIL 40 MG/1
2 X 40 TABLET, FILM COATED ORAL
Qty: 2 | Refills: 0 | Status: DISCONTINUED | COMMUNITY
Start: 2019-04-04

## 2019-04-25 RX ORDER — BROMPHENIRAMINE MALEATE, PSEUDOEPHEDRINE HYDROCHLORIDE, 2; 30; 10 MG/5ML; MG/5ML; MG/5ML
30-2-10 SYRUP ORAL
Qty: 300 | Refills: 0 | Status: DISCONTINUED | COMMUNITY
Start: 2019-04-04

## 2019-04-25 RX ORDER — OFLOXACIN 3 MG/ML
0.3 SOLUTION/ DROPS OPHTHALMIC
Qty: 10 | Refills: 0 | Status: DISCONTINUED | COMMUNITY
Start: 2019-02-12

## 2019-04-25 NOTE — HISTORY OF PRESENT ILLNESS
[FreeTextEntry1] : Rx renewal, fasting cholesterol check and bwk.\par recently diagnosed with latent TB. On Dual weekly therapy

## 2019-04-25 NOTE — COUNSELING
[Weight management counseling provided] : Weight management [Healthy eating counseling provided] : healthy eating [Activity counseling provided] : activity [Behavioral health counseling provided] : behavioral health  [Fall prevention counseling provided] : fall prevention  [Decrease Portions] : Decrease food portions [Walking] : Walking [Engage in a relaxing activity] : Engage in a relaxing activity [Plan in advance] : Plan in advance [None] : None [Good understanding] : Patient has a good understanding of lifestyle changes and the steps needed to achieve self management goals

## 2019-04-26 ENCOUNTER — APPOINTMENT (OUTPATIENT)
Dept: HEMATOLOGY ONCOLOGY | Facility: CLINIC | Age: 49
End: 2019-04-26

## 2019-04-26 LAB
BASOPHILS # BLD AUTO: 0.04 K/UL
BASOPHILS NFR BLD AUTO: 0.9 %
CHOLEST SERPL-MCNC: 162 MG/DL
CHOLEST/HDLC SERPL: 4.4 RATIO
EOSINOPHIL # BLD AUTO: 0.09 K/UL
EOSINOPHIL NFR BLD AUTO: 1.9 %
HCT VFR BLD CALC: 52.2 %
HDLC SERPL-MCNC: 37 MG/DL
HGB BLD-MCNC: 16.9 G/DL
IMM GRANULOCYTES NFR BLD AUTO: 0.2 %
LDLC SERPL CALC-MCNC: 105 MG/DL
LYMPHOCYTES # BLD AUTO: 1.49 K/UL
LYMPHOCYTES NFR BLD AUTO: 32.3 %
MAN DIFF?: NORMAL
MCHC RBC-ENTMCNC: 29.9 PG
MCHC RBC-ENTMCNC: 32.4 GM/DL
MCV RBC AUTO: 92.2 FL
MONOCYTES # BLD AUTO: 0.49 K/UL
MONOCYTES NFR BLD AUTO: 10.6 %
NEUTROPHILS # BLD AUTO: 2.5 K/UL
NEUTROPHILS NFR BLD AUTO: 54.1 %
PLATELET # BLD AUTO: 229 K/UL
RBC # BLD: 5.66 M/UL
RBC # FLD: 14.6 %
T3FREE SERPL-MCNC: 3.8 PG/ML
T4 FREE SERPL-MCNC: 1.3 NG/DL
TRIGL SERPL-MCNC: 101 MG/DL
TSH SERPL-ACNC: 1.84 UIU/ML
WBC # FLD AUTO: 4.62 K/UL

## 2019-04-29 ENCOUNTER — APPOINTMENT (OUTPATIENT)
Dept: CARDIOLOGY | Facility: CLINIC | Age: 49
End: 2019-04-29

## 2019-05-01 ENCOUNTER — TRANSCRIPTION ENCOUNTER (OUTPATIENT)
Age: 49
End: 2019-05-01

## 2019-05-01 LAB
TESTOST BND SERPL-MCNC: 35.5 PG/ML
TESTOST SERPL-MCNC: 1296.8 NG/DL

## 2019-05-02 ENCOUNTER — APPOINTMENT (OUTPATIENT)
Dept: DERMATOLOGY | Facility: CLINIC | Age: 49
End: 2019-05-02

## 2019-05-02 ENCOUNTER — TRANSCRIPTION ENCOUNTER (OUTPATIENT)
Age: 49
End: 2019-05-02

## 2019-05-05 ENCOUNTER — TRANSCRIPTION ENCOUNTER (OUTPATIENT)
Age: 49
End: 2019-05-05

## 2019-05-07 ENCOUNTER — APPOINTMENT (OUTPATIENT)
Dept: OTOLARYNGOLOGY | Facility: CLINIC | Age: 49
End: 2019-05-07

## 2019-05-15 ENCOUNTER — RX RENEWAL (OUTPATIENT)
Age: 49
End: 2019-05-15

## 2019-05-29 ENCOUNTER — INPATIENT (INPATIENT)
Facility: HOSPITAL | Age: 49
LOS: 2 days | Discharge: HOME | DRG: 494 | End: 2019-06-01
Attending: ORTHOPAEDIC SURGERY | Admitting: ORTHOPAEDIC SURGERY
Payer: COMMERCIAL

## 2019-05-29 ENCOUNTER — RX RENEWAL (OUTPATIENT)
Age: 49
End: 2019-05-29

## 2019-05-29 ENCOUNTER — TRANSCRIPTION ENCOUNTER (OUTPATIENT)
Age: 49
End: 2019-05-29

## 2019-05-29 VITALS
OXYGEN SATURATION: 100 % | HEART RATE: 54 BPM | DIASTOLIC BLOOD PRESSURE: 48 MMHG | RESPIRATION RATE: 18 BRPM | SYSTOLIC BLOOD PRESSURE: 78 MMHG

## 2019-05-29 LAB
ALBUMIN SERPL ELPH-MCNC: 4.4 G/DL — SIGNIFICANT CHANGE UP (ref 3.3–5)
ALP SERPL-CCNC: 50 U/L — SIGNIFICANT CHANGE UP (ref 40–120)
ALT FLD-CCNC: 55 U/L — HIGH (ref 10–45)
ANION GAP SERPL CALC-SCNC: 14 MMOL/L — SIGNIFICANT CHANGE UP (ref 5–17)
APTT BLD: 22.3 SEC — LOW (ref 27.5–36.3)
AST SERPL-CCNC: 42 U/L — HIGH (ref 10–40)
BASOPHILS # BLD AUTO: 0 K/UL — SIGNIFICANT CHANGE UP (ref 0–0.2)
BASOPHILS NFR BLD AUTO: 0.7 % — SIGNIFICANT CHANGE UP (ref 0–2)
BILIRUB SERPL-MCNC: 0.6 MG/DL — SIGNIFICANT CHANGE UP (ref 0.2–1.2)
BUN SERPL-MCNC: 15 MG/DL — SIGNIFICANT CHANGE UP (ref 7–23)
CALCIUM SERPL-MCNC: 9.1 MG/DL — SIGNIFICANT CHANGE UP (ref 8.4–10.5)
CHLORIDE SERPL-SCNC: 96 MMOL/L — SIGNIFICANT CHANGE UP (ref 96–108)
CO2 SERPL-SCNC: 25 MMOL/L — SIGNIFICANT CHANGE UP (ref 22–31)
CREAT SERPL-MCNC: 1.23 MG/DL — SIGNIFICANT CHANGE UP (ref 0.5–1.3)
EOSINOPHIL # BLD AUTO: 0.1 K/UL — SIGNIFICANT CHANGE UP (ref 0–0.5)
EOSINOPHIL NFR BLD AUTO: 1.8 % — SIGNIFICANT CHANGE UP (ref 0–6)
GLUCOSE SERPL-MCNC: 119 MG/DL — HIGH (ref 70–99)
HCT VFR BLD CALC: 46.2 % — SIGNIFICANT CHANGE UP (ref 39–50)
HGB BLD-MCNC: 15.6 G/DL — SIGNIFICANT CHANGE UP (ref 13–17)
INR BLD: 0.96 RATIO — SIGNIFICANT CHANGE UP (ref 0.88–1.16)
LYMPHOCYTES # BLD AUTO: 2.2 K/UL — SIGNIFICANT CHANGE UP (ref 1–3.3)
LYMPHOCYTES # BLD AUTO: 38.4 % — SIGNIFICANT CHANGE UP (ref 13–44)
MCHC RBC-ENTMCNC: 30 PG — SIGNIFICANT CHANGE UP (ref 27–34)
MCHC RBC-ENTMCNC: 33.8 GM/DL — SIGNIFICANT CHANGE UP (ref 32–36)
MCV RBC AUTO: 88.8 FL — SIGNIFICANT CHANGE UP (ref 80–100)
MONOCYTES # BLD AUTO: 0.6 K/UL — SIGNIFICANT CHANGE UP (ref 0–0.9)
MONOCYTES NFR BLD AUTO: 10.9 % — SIGNIFICANT CHANGE UP (ref 2–14)
NEUTROPHILS # BLD AUTO: 2.7 K/UL — SIGNIFICANT CHANGE UP (ref 1.8–7.4)
NEUTROPHILS NFR BLD AUTO: 48.3 % — SIGNIFICANT CHANGE UP (ref 43–77)
PLATELET # BLD AUTO: 217 K/UL — SIGNIFICANT CHANGE UP (ref 150–400)
POTASSIUM SERPL-MCNC: 3.5 MMOL/L — SIGNIFICANT CHANGE UP (ref 3.5–5.3)
POTASSIUM SERPL-SCNC: 3.5 MMOL/L — SIGNIFICANT CHANGE UP (ref 3.5–5.3)
PROT SERPL-MCNC: 6.5 G/DL — SIGNIFICANT CHANGE UP (ref 6–8.3)
PROTHROM AB SERPL-ACNC: 10.9 SEC — SIGNIFICANT CHANGE UP (ref 10–12.9)
RBC # BLD: 5.21 M/UL — SIGNIFICANT CHANGE UP (ref 4.2–5.8)
RBC # FLD: 12.7 % — SIGNIFICANT CHANGE UP (ref 10.3–14.5)
SODIUM SERPL-SCNC: 135 MMOL/L — SIGNIFICANT CHANGE UP (ref 135–145)
WBC # BLD: 5.6 K/UL — SIGNIFICANT CHANGE UP (ref 3.8–10.5)
WBC # FLD AUTO: 5.6 K/UL — SIGNIFICANT CHANGE UP (ref 3.8–10.5)

## 2019-05-29 PROCEDURE — 73610 X-RAY EXAM OF ANKLE: CPT | Mod: 26,LT

## 2019-05-29 PROCEDURE — 73600 X-RAY EXAM OF ANKLE: CPT | Mod: 26,59,LT

## 2019-05-29 PROCEDURE — 71045 X-RAY EXAM CHEST 1 VIEW: CPT | Mod: 26

## 2019-05-29 PROCEDURE — 73590 X-RAY EXAM OF LOWER LEG: CPT | Mod: 26,76,LT

## 2019-05-29 RX ORDER — OXYCODONE HYDROCHLORIDE 5 MG/1
10 TABLET ORAL EVERY 4 HOURS
Refills: 0 | Status: DISCONTINUED | OUTPATIENT
Start: 2019-05-29 | End: 2019-05-30

## 2019-05-29 RX ORDER — ESCITALOPRAM OXALATE 10 MG/1
10 TABLET, FILM COATED ORAL DAILY
Refills: 0 | Status: DISCONTINUED | OUTPATIENT
Start: 2019-05-29 | End: 2019-05-30

## 2019-05-29 RX ORDER — SODIUM CHLORIDE 9 MG/ML
1000 INJECTION INTRAMUSCULAR; INTRAVENOUS; SUBCUTANEOUS
Refills: 0 | Status: DISCONTINUED | OUTPATIENT
Start: 2019-05-29 | End: 2019-05-30

## 2019-05-29 RX ORDER — ONDANSETRON 8 MG/1
4 TABLET, FILM COATED ORAL ONCE
Refills: 0 | Status: COMPLETED | OUTPATIENT
Start: 2019-05-29 | End: 2019-05-29

## 2019-05-29 RX ORDER — HYDROMORPHONE HYDROCHLORIDE 2 MG/ML
0.5 INJECTION INTRAMUSCULAR; INTRAVENOUS; SUBCUTANEOUS ONCE
Refills: 0 | Status: DISCONTINUED | OUTPATIENT
Start: 2019-05-29 | End: 2019-05-30

## 2019-05-29 RX ORDER — LOSARTAN POTASSIUM 100 MG/1
100 TABLET, FILM COATED ORAL DAILY
Refills: 0 | Status: DISCONTINUED | OUTPATIENT
Start: 2019-05-29 | End: 2019-05-30

## 2019-05-29 RX ORDER — ACETAMINOPHEN 500 MG
650 TABLET ORAL EVERY 6 HOURS
Refills: 0 | Status: DISCONTINUED | OUTPATIENT
Start: 2019-05-29 | End: 2019-05-30

## 2019-05-29 RX ORDER — KETAMINE HYDROCHLORIDE 100 MG/ML
100 INJECTION INTRAMUSCULAR; INTRAVENOUS ONCE
Refills: 0 | Status: DISCONTINUED | OUTPATIENT
Start: 2019-05-29 | End: 2019-05-29

## 2019-05-29 RX ORDER — HYDROMORPHONE HYDROCHLORIDE 2 MG/ML
0.5 INJECTION INTRAMUSCULAR; INTRAVENOUS; SUBCUTANEOUS ONCE
Refills: 0 | Status: DISCONTINUED | OUTPATIENT
Start: 2019-05-29 | End: 2019-05-29

## 2019-05-29 RX ORDER — MORPHINE SULFATE 50 MG/1
4 CAPSULE, EXTENDED RELEASE ORAL ONCE
Refills: 0 | Status: DISCONTINUED | OUTPATIENT
Start: 2019-05-29 | End: 2019-05-29

## 2019-05-29 RX ORDER — OXYCODONE HYDROCHLORIDE 5 MG/1
5 TABLET ORAL EVERY 4 HOURS
Refills: 0 | Status: DISCONTINUED | OUTPATIENT
Start: 2019-05-29 | End: 2019-05-30

## 2019-05-29 RX ORDER — HYDROMORPHONE HYDROCHLORIDE 2 MG/ML
0.5 INJECTION INTRAMUSCULAR; INTRAVENOUS; SUBCUTANEOUS
Refills: 0 | Status: DISCONTINUED | OUTPATIENT
Start: 2019-05-29 | End: 2019-05-30

## 2019-05-29 RX ORDER — HYDROCHLOROTHIAZIDE 25 MG
100 TABLET ORAL DAILY
Refills: 0 | Status: DISCONTINUED | OUTPATIENT
Start: 2019-05-29 | End: 2019-05-30

## 2019-05-29 RX ORDER — HYDROMORPHONE HYDROCHLORIDE 2 MG/ML
1 INJECTION INTRAMUSCULAR; INTRAVENOUS; SUBCUTANEOUS ONCE
Refills: 0 | Status: DISCONTINUED | OUTPATIENT
Start: 2019-05-29 | End: 2019-05-29

## 2019-05-29 RX ORDER — DEXTROAMPHETAMINE SACCHARATE, AMPHETAMINE ASPARTATE, DEXTROAMPHETAMINE SULFATE AND AMPHETAMINE SULFATE 1.875; 1.875; 1.875; 1.875 MG/1; MG/1; MG/1; MG/1
20 TABLET ORAL
Refills: 0 | Status: DISCONTINUED | OUTPATIENT
Start: 2019-05-29 | End: 2019-05-30

## 2019-05-29 RX ADMIN — KETAMINE HYDROCHLORIDE 100 MILLIGRAM(S): 100 INJECTION INTRAMUSCULAR; INTRAVENOUS at 20:33

## 2019-05-29 RX ADMIN — HYDROMORPHONE HYDROCHLORIDE 0.5 MILLIGRAM(S): 2 INJECTION INTRAMUSCULAR; INTRAVENOUS; SUBCUTANEOUS at 22:01

## 2019-05-29 RX ADMIN — ONDANSETRON 4 MILLIGRAM(S): 8 TABLET, FILM COATED ORAL at 20:00

## 2019-05-29 NOTE — H&P ADULT - HISTORY OF PRESENT ILLNESS
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 Patient was on break, outside, and had mechanical fall injuring left ankle. Denies HS/LOC. Denies pain/injury elsewhere. Denies numbness/tingling. Per ED, patient initially did not have pulses distally in LLE, conscious sedation and closed reduction was performed by ED physician, resulting in return of pulses. At time of interview, patient in LLE trilam splint, foot well perfused. Patient is a community ambulator at baseline.

## 2019-05-29 NOTE — ED PROVIDER NOTE - ATTENDING CONTRIBUTION TO CARE
Attending MD Katlyn Charlton:  I personally have seen and examined this patient.  Resident note reviewed and agree on plan of care and except where noted.  See HPI, PE, and MDM for details.

## 2019-05-29 NOTE — ED PROVIDER NOTE - PHYSICAL EXAMINATION
Attending Katlyn Charlton: Gen: NAD, heent: atrauamtic, eomi, perrla, mmm, op pink, uvula midline, neck; nttp, no nuchal rigidity, chest: nttp, no crepitus, cv: rrr, no murmurs, lungs: ctab, abd: soft, nontender, nondistended, no peritoneal signs, +BS, no guarding, ext: wwp, deformity to left ankle with skin tenting, initially DP pulse intact however lost while in the ed, skin: no rash, neuro: awake and alert, following commands, speech clear, sensation and strength intact, no focal deficits

## 2019-05-29 NOTE — H&P ADULT - MUSCULOSKELETAL COMMENTS
LLE: splint removed, small superficial abrasion over medial mal, otherwise skin intact, TTP med/lat mal and along mid/distal fibula, +EHL/FHL/TA/GS intact, SILT, 2+ DP pulse, brisk cap refill, compartments soft

## 2019-05-29 NOTE — ED PROVIDER NOTE - CARE PLAN
Principal Discharge DX:	Ankle fracture Principal Discharge DX:	Ankle fracture  Goal:	left bimalleolar ankle fracture

## 2019-05-29 NOTE — ED PROVIDER NOTE - CLINICAL SUMMARY MEDICAL DECISION MAKING FREE TEXT BOX
ankle dislocation and fracture reduce pain control ortho consult ankle dislocation and fracture reduce pain control ortho consult  Attending Katlyn Charlton: 47 y/o male presenting after fall with ankle injury. pt given 10mg morphine by ems. upon arrival pt awake and alert following commands. while in the ed pt became transiently hypotensive and diaphoretic. no evidence of further trauma on exam and abd soft. suspect likely vasovagal and effect of morphine. pt given fluids with imoprovement in BP. abd soft and nontender. while in the ed pt lost pulses to deformed foot. decision made to sedate and performed immediate reduction. orthopedics called upon arrival. pt will likely need OR for fracture. plan to admit

## 2019-05-29 NOTE — H&P ADULT - ASSESSMENT
49 YO M with L ankle fracture/dislocation s/p closed reduction in ED  Admit to ortho  FU labs/imaging  FU medical clearance  Pain control  NWB LLE in trilam splint  NPO  IVF   Hold AC  Plan for OR tomorrow with Dr. Shook for ORIF

## 2019-05-29 NOTE — ED PROVIDER NOTE - OBJECTIVE STATEMENT
Attending Katlyn Charlton: 49 y/o male presnting after fall. pt tripped on the water and twisted his left ankle. afterward unable to walk. pt complaining of pain to left ankle.no head trauma. denies any abdominal pain Attending Katlyn Charlton: 49 y/o male presnting after fall. pt tripped on the water and twisted his left ankle. afterward unable to walk. pt complaining of pain to left ankle.no head trauma. denies any abdominal pain. no numbness or tingling. not on blood thinners. denies any history or bleeding disorders

## 2019-05-29 NOTE — ED ADULT NURSE NOTE - OBJECTIVE STATEMENT
pt is a 48 yr M s/p mechanical fall, pt slipped in the rain twisting L ankle. denies hitting head or LOC. +deformity to L ankle, minimal pulses, no opening in skin noted. pt received 10mg of morphine PTA by EMS. pt in Ed found to be hypotensive, bradycardiac, diaphoretic and pale.. MD at bedside. completing doppler of ankle. pt undressed, no other obvious injuries noted. EKG completed. pt placed on cardiac monitor.

## 2019-05-29 NOTE — ED PROVIDER NOTE - PROGRESS NOTE DETAILS
Attending Katlyn Charlton: orthopedics called upon arrival as concern for skin tenting and deformity Attending Katlyn Charlton: pt lost pulses in his foot. decision made to sedate for emergent reduction Attending Katlyn Charlton: pt given ketamine. ankle reduced and splint placed. awaiting orthopedic consult Discrepancy on peervue populated to the ED regarding midshaft widening of the tib/fib s/p reduction.  This was in error as the pt was admitted to ortho and there was a follow up CT after this xray.  Sandra

## 2019-05-30 ENCOUNTER — TRANSCRIPTION ENCOUNTER (OUTPATIENT)
Age: 49
End: 2019-05-30

## 2019-05-30 DIAGNOSIS — I10 ESSENTIAL (PRIMARY) HYPERTENSION: ICD-10-CM

## 2019-05-30 DIAGNOSIS — F98.8 OTHER SPECIFIED BEHAVIORAL AND EMOTIONAL DISORDERS WITH ONSET USUALLY OCCURRING IN CHILDHOOD AND ADOLESCENCE: ICD-10-CM

## 2019-05-30 DIAGNOSIS — E03.9 HYPOTHYROIDISM, UNSPECIFIED: ICD-10-CM

## 2019-05-30 DIAGNOSIS — S82.899A OTHER FRACTURE OF UNSPECIFIED LOWER LEG, INITIAL ENCOUNTER FOR CLOSED FRACTURE: ICD-10-CM

## 2019-05-30 LAB
ANION GAP SERPL CALC-SCNC: 13 MMOL/L — SIGNIFICANT CHANGE UP (ref 5–17)
ANION GAP SERPL CALC-SCNC: 13 MMOL/L — SIGNIFICANT CHANGE UP (ref 5–17)
APPEARANCE UR: CLEAR — SIGNIFICANT CHANGE UP
APTT BLD: 22.7 SEC — LOW (ref 27.5–36.3)
BASOPHILS # BLD AUTO: 0 K/UL — SIGNIFICANT CHANGE UP (ref 0–0.2)
BASOPHILS NFR BLD AUTO: 0.2 % — SIGNIFICANT CHANGE UP (ref 0–2)
BILIRUB UR-MCNC: NEGATIVE — SIGNIFICANT CHANGE UP
BLD GP AB SCN SERPL QL: NEGATIVE — SIGNIFICANT CHANGE UP
BLD GP AB SCN SERPL QL: NEGATIVE — SIGNIFICANT CHANGE UP
BUN SERPL-MCNC: 14 MG/DL — SIGNIFICANT CHANGE UP (ref 7–23)
BUN SERPL-MCNC: 16 MG/DL — SIGNIFICANT CHANGE UP (ref 7–23)
CALCIUM SERPL-MCNC: 8.3 MG/DL — LOW (ref 8.4–10.5)
CALCIUM SERPL-MCNC: 8.4 MG/DL — SIGNIFICANT CHANGE UP (ref 8.4–10.5)
CHLORIDE SERPL-SCNC: 101 MMOL/L — SIGNIFICANT CHANGE UP (ref 96–108)
CHLORIDE SERPL-SCNC: 101 MMOL/L — SIGNIFICANT CHANGE UP (ref 96–108)
CO2 SERPL-SCNC: 23 MMOL/L — SIGNIFICANT CHANGE UP (ref 22–31)
CO2 SERPL-SCNC: 23 MMOL/L — SIGNIFICANT CHANGE UP (ref 22–31)
COLOR SPEC: YELLOW — SIGNIFICANT CHANGE UP
CREAT SERPL-MCNC: 1.02 MG/DL — SIGNIFICANT CHANGE UP (ref 0.5–1.3)
CREAT SERPL-MCNC: 1.16 MG/DL — SIGNIFICANT CHANGE UP (ref 0.5–1.3)
DIFF PNL FLD: NEGATIVE — SIGNIFICANT CHANGE UP
EOSINOPHIL # BLD AUTO: 0 K/UL — SIGNIFICANT CHANGE UP (ref 0–0.5)
EOSINOPHIL NFR BLD AUTO: 0.5 % — SIGNIFICANT CHANGE UP (ref 0–6)
GLUCOSE SERPL-MCNC: 153 MG/DL — HIGH (ref 70–99)
GLUCOSE SERPL-MCNC: 157 MG/DL — HIGH (ref 70–99)
GLUCOSE UR QL: NEGATIVE — SIGNIFICANT CHANGE UP
HCT VFR BLD CALC: 41.2 % — SIGNIFICANT CHANGE UP (ref 39–50)
HCT VFR BLD CALC: 44.4 % — SIGNIFICANT CHANGE UP (ref 39–50)
HGB BLD-MCNC: 14.6 G/DL — SIGNIFICANT CHANGE UP (ref 13–17)
HGB BLD-MCNC: 14.9 G/DL — SIGNIFICANT CHANGE UP (ref 13–17)
INR BLD: 0.95 RATIO — SIGNIFICANT CHANGE UP (ref 0.88–1.16)
KETONES UR-MCNC: NEGATIVE — SIGNIFICANT CHANGE UP
LEUKOCYTE ESTERASE UR-ACNC: NEGATIVE — SIGNIFICANT CHANGE UP
LYMPHOCYTES # BLD AUTO: 0.5 K/UL — LOW (ref 1–3.3)
LYMPHOCYTES # BLD AUTO: 7 % — LOW (ref 13–44)
MCHC RBC-ENTMCNC: 30.1 PG — SIGNIFICANT CHANGE UP (ref 27–34)
MCHC RBC-ENTMCNC: 31.4 PG — SIGNIFICANT CHANGE UP (ref 27–34)
MCHC RBC-ENTMCNC: 33.7 GM/DL — SIGNIFICANT CHANGE UP (ref 32–36)
MCHC RBC-ENTMCNC: 35.4 GM/DL — SIGNIFICANT CHANGE UP (ref 32–36)
MCV RBC AUTO: 88.7 FL — SIGNIFICANT CHANGE UP (ref 80–100)
MCV RBC AUTO: 89.5 FL — SIGNIFICANT CHANGE UP (ref 80–100)
MONOCYTES # BLD AUTO: 0.4 K/UL — SIGNIFICANT CHANGE UP (ref 0–0.9)
MONOCYTES NFR BLD AUTO: 5 % — SIGNIFICANT CHANGE UP (ref 2–14)
NEUTROPHILS # BLD AUTO: 6.6 K/UL — SIGNIFICANT CHANGE UP (ref 1.8–7.4)
NEUTROPHILS NFR BLD AUTO: 87.4 % — HIGH (ref 43–77)
NITRITE UR-MCNC: NEGATIVE — SIGNIFICANT CHANGE UP
PH UR: 6 — SIGNIFICANT CHANGE UP (ref 5–8)
PLATELET # BLD AUTO: 176 K/UL — SIGNIFICANT CHANGE UP (ref 150–400)
PLATELET # BLD AUTO: 200 K/UL — SIGNIFICANT CHANGE UP (ref 150–400)
POTASSIUM SERPL-MCNC: 3.8 MMOL/L — SIGNIFICANT CHANGE UP (ref 3.5–5.3)
POTASSIUM SERPL-MCNC: 3.9 MMOL/L — SIGNIFICANT CHANGE UP (ref 3.5–5.3)
POTASSIUM SERPL-SCNC: 3.8 MMOL/L — SIGNIFICANT CHANGE UP (ref 3.5–5.3)
POTASSIUM SERPL-SCNC: 3.9 MMOL/L — SIGNIFICANT CHANGE UP (ref 3.5–5.3)
PROT UR-MCNC: NEGATIVE — SIGNIFICANT CHANGE UP
PROTHROM AB SERPL-ACNC: 10.8 SEC — SIGNIFICANT CHANGE UP (ref 10–12.9)
RBC # BLD: 4.65 M/UL — SIGNIFICANT CHANGE UP (ref 4.2–5.8)
RBC # BLD: 4.96 M/UL — SIGNIFICANT CHANGE UP (ref 4.2–5.8)
RBC # FLD: 12.7 % — SIGNIFICANT CHANGE UP (ref 10.3–14.5)
RBC # FLD: 12.8 % — SIGNIFICANT CHANGE UP (ref 10.3–14.5)
RH IG SCN BLD-IMP: POSITIVE — SIGNIFICANT CHANGE UP
RH IG SCN BLD-IMP: POSITIVE — SIGNIFICANT CHANGE UP
SODIUM SERPL-SCNC: 137 MMOL/L — SIGNIFICANT CHANGE UP (ref 135–145)
SODIUM SERPL-SCNC: 137 MMOL/L — SIGNIFICANT CHANGE UP (ref 135–145)
SP GR SPEC: 1.01 — LOW (ref 1.01–1.02)
UROBILINOGEN FLD QL: NEGATIVE — SIGNIFICANT CHANGE UP
WBC # BLD: 7.5 K/UL — SIGNIFICANT CHANGE UP (ref 3.8–10.5)
WBC # BLD: 8.2 K/UL — SIGNIFICANT CHANGE UP (ref 3.8–10.5)
WBC # FLD AUTO: 7.5 K/UL — SIGNIFICANT CHANGE UP (ref 3.8–10.5)
WBC # FLD AUTO: 8.2 K/UL — SIGNIFICANT CHANGE UP (ref 3.8–10.5)

## 2019-05-30 PROCEDURE — 76377 3D RENDER W/INTRP POSTPROCES: CPT | Mod: 26

## 2019-05-30 PROCEDURE — 27792 TREATMENT OF ANKLE FRACTURE: CPT | Mod: LT

## 2019-05-30 PROCEDURE — 27829 TREAT LOWER LEG JOINT: CPT | Mod: LT

## 2019-05-30 PROCEDURE — 73700 CT LOWER EXTREMITY W/O DYE: CPT | Mod: 26,LT

## 2019-05-30 PROCEDURE — 76000 FLUOROSCOPY <1 HR PHYS/QHP: CPT | Mod: 26

## 2019-05-30 RX ORDER — DOCUSATE SODIUM 100 MG
1 CAPSULE ORAL
Qty: 0 | Refills: 0 | DISCHARGE
Start: 2019-05-30

## 2019-05-30 RX ORDER — ONDANSETRON 8 MG/1
4 TABLET, FILM COATED ORAL ONCE
Refills: 0 | Status: DISCONTINUED | OUTPATIENT
Start: 2019-05-30 | End: 2019-05-30

## 2019-05-30 RX ORDER — HYDROMORPHONE HYDROCHLORIDE 2 MG/ML
2 INJECTION INTRAMUSCULAR; INTRAVENOUS; SUBCUTANEOUS
Refills: 0 | Status: DISCONTINUED | OUTPATIENT
Start: 2019-05-30 | End: 2019-06-01

## 2019-05-30 RX ORDER — HYDROMORPHONE HYDROCHLORIDE 2 MG/ML
0.5 INJECTION INTRAMUSCULAR; INTRAVENOUS; SUBCUTANEOUS
Refills: 0 | Status: DISCONTINUED | OUTPATIENT
Start: 2019-05-30 | End: 2019-05-30

## 2019-05-30 RX ORDER — CEFAZOLIN SODIUM 1 G
2000 VIAL (EA) INJECTION EVERY 8 HOURS
Refills: 0 | Status: COMPLETED | OUTPATIENT
Start: 2019-05-30 | End: 2019-05-31

## 2019-05-30 RX ORDER — LOSARTAN POTASSIUM 100 MG/1
100 TABLET, FILM COATED ORAL DAILY
Refills: 0 | Status: DISCONTINUED | OUTPATIENT
Start: 2019-05-30 | End: 2019-06-01

## 2019-05-30 RX ORDER — ACETAMINOPHEN 500 MG
975 TABLET ORAL EVERY 8 HOURS
Refills: 0 | Status: DISCONTINUED | OUTPATIENT
Start: 2019-05-30 | End: 2019-05-30

## 2019-05-30 RX ORDER — HYDROMORPHONE HYDROCHLORIDE 2 MG/ML
1 INJECTION INTRAMUSCULAR; INTRAVENOUS; SUBCUTANEOUS ONCE
Refills: 0 | Status: DISCONTINUED | OUTPATIENT
Start: 2019-05-30 | End: 2019-05-30

## 2019-05-30 RX ORDER — ASPIRIN/CALCIUM CARB/MAGNESIUM 324 MG
1 TABLET ORAL
Qty: 0 | Refills: 0 | DISCHARGE
Start: 2019-05-30

## 2019-05-30 RX ORDER — OXYCODONE HYDROCHLORIDE 5 MG/1
5 TABLET ORAL EVERY 4 HOURS
Refills: 0 | Status: DISCONTINUED | OUTPATIENT
Start: 2019-05-30 | End: 2019-05-30

## 2019-05-30 RX ORDER — KETOROLAC TROMETHAMINE 30 MG/ML
15 SYRINGE (ML) INJECTION ONCE
Refills: 0 | Status: DISCONTINUED | OUTPATIENT
Start: 2019-05-30 | End: 2019-05-30

## 2019-05-30 RX ORDER — TRAMADOL HYDROCHLORIDE 50 MG/1
50 TABLET ORAL EVERY 8 HOURS
Refills: 0 | Status: DISCONTINUED | OUTPATIENT
Start: 2019-05-30 | End: 2019-05-31

## 2019-05-30 RX ORDER — FAMOTIDINE 10 MG/ML
20 INJECTION INTRAVENOUS
Refills: 0 | Status: DISCONTINUED | OUTPATIENT
Start: 2019-05-30 | End: 2019-06-01

## 2019-05-30 RX ORDER — ESCITALOPRAM OXALATE 10 MG/1
1 TABLET, FILM COATED ORAL
Qty: 0 | Refills: 0 | DISCHARGE
Start: 2019-05-30

## 2019-05-30 RX ORDER — DEXTROAMPHETAMINE SACCHARATE, AMPHETAMINE ASPARTATE, DEXTROAMPHETAMINE SULFATE AND AMPHETAMINE SULFATE 1.875; 1.875; 1.875; 1.875 MG/1; MG/1; MG/1; MG/1
1 TABLET ORAL
Qty: 0 | Refills: 0 | DISCHARGE
Start: 2019-05-30

## 2019-05-30 RX ORDER — SENNA PLUS 8.6 MG/1
2 TABLET ORAL AT BEDTIME
Refills: 0 | Status: DISCONTINUED | OUTPATIENT
Start: 2019-05-30 | End: 2019-06-01

## 2019-05-30 RX ORDER — ONDANSETRON 8 MG/1
4 TABLET, FILM COATED ORAL ONCE
Refills: 0 | Status: COMPLETED | OUTPATIENT
Start: 2019-05-30 | End: 2019-05-30

## 2019-05-30 RX ORDER — ESCITALOPRAM OXALATE 10 MG/1
10 TABLET, FILM COATED ORAL DAILY
Refills: 0 | Status: DISCONTINUED | OUTPATIENT
Start: 2019-05-30 | End: 2019-06-01

## 2019-05-30 RX ORDER — ACETAMINOPHEN 500 MG
1000 TABLET ORAL ONCE
Refills: 0 | Status: COMPLETED | OUTPATIENT
Start: 2019-05-30 | End: 2019-05-30

## 2019-05-30 RX ORDER — MAGNESIUM HYDROXIDE 400 MG/1
30 TABLET, CHEWABLE ORAL DAILY
Refills: 0 | Status: DISCONTINUED | OUTPATIENT
Start: 2019-05-30 | End: 2019-06-01

## 2019-05-30 RX ORDER — HYDROMORPHONE HYDROCHLORIDE 2 MG/ML
4 INJECTION INTRAMUSCULAR; INTRAVENOUS; SUBCUTANEOUS
Refills: 0 | Status: DISCONTINUED | OUTPATIENT
Start: 2019-05-30 | End: 2019-06-01

## 2019-05-30 RX ORDER — FAMOTIDINE 10 MG/ML
1 INJECTION INTRAVENOUS
Qty: 0 | Refills: 0 | DISCHARGE
Start: 2019-05-30

## 2019-05-30 RX ORDER — HYDROMORPHONE HYDROCHLORIDE 2 MG/ML
1 INJECTION INTRAMUSCULAR; INTRAVENOUS; SUBCUTANEOUS
Qty: 0 | Refills: 0 | DISCHARGE
Start: 2019-05-30

## 2019-05-30 RX ORDER — SODIUM CHLORIDE 9 MG/ML
500 INJECTION INTRAMUSCULAR; INTRAVENOUS; SUBCUTANEOUS ONCE
Refills: 0 | Status: COMPLETED | OUTPATIENT
Start: 2019-05-30 | End: 2019-05-30

## 2019-05-30 RX ORDER — OXYCODONE HYDROCHLORIDE 5 MG/1
10 TABLET ORAL EVERY 4 HOURS
Refills: 0 | Status: DISCONTINUED | OUTPATIENT
Start: 2019-05-30 | End: 2019-05-30

## 2019-05-30 RX ORDER — DEXTROAMPHETAMINE SACCHARATE, AMPHETAMINE ASPARTATE, DEXTROAMPHETAMINE SULFATE AND AMPHETAMINE SULFATE 1.875; 1.875; 1.875; 1.875 MG/1; MG/1; MG/1; MG/1
20 TABLET ORAL
Refills: 0 | Status: DISCONTINUED | OUTPATIENT
Start: 2019-05-30 | End: 2019-06-01

## 2019-05-30 RX ORDER — HYDROCHLOROTHIAZIDE 25 MG
100 TABLET ORAL DAILY
Refills: 0 | Status: DISCONTINUED | OUTPATIENT
Start: 2019-05-30 | End: 2019-06-01

## 2019-05-30 RX ORDER — HYDROMORPHONE HYDROCHLORIDE 2 MG/ML
1 INJECTION INTRAMUSCULAR; INTRAVENOUS; SUBCUTANEOUS EVERY 4 HOURS
Refills: 0 | Status: DISCONTINUED | OUTPATIENT
Start: 2019-05-30 | End: 2019-05-31

## 2019-05-30 RX ORDER — SODIUM CHLORIDE 9 MG/ML
1000 INJECTION, SOLUTION INTRAVENOUS
Refills: 0 | Status: DISCONTINUED | OUTPATIENT
Start: 2019-05-30 | End: 2019-05-31

## 2019-05-30 RX ORDER — ASPIRIN/CALCIUM CARB/MAGNESIUM 324 MG
325 TABLET ORAL
Refills: 0 | Status: DISCONTINUED | OUTPATIENT
Start: 2019-05-30 | End: 2019-06-01

## 2019-05-30 RX ORDER — ONDANSETRON 8 MG/1
4 TABLET, FILM COATED ORAL EVERY 6 HOURS
Refills: 0 | Status: DISCONTINUED | OUTPATIENT
Start: 2019-05-30 | End: 2019-06-01

## 2019-05-30 RX ORDER — DOCUSATE SODIUM 100 MG
100 CAPSULE ORAL THREE TIMES A DAY
Refills: 0 | Status: DISCONTINUED | OUTPATIENT
Start: 2019-05-30 | End: 2019-06-01

## 2019-05-30 RX ADMIN — HYDROMORPHONE HYDROCHLORIDE 1 MILLIGRAM(S): 2 INJECTION INTRAMUSCULAR; INTRAVENOUS; SUBCUTANEOUS at 08:48

## 2019-05-30 RX ADMIN — HYDROMORPHONE HYDROCHLORIDE 0.5 MILLIGRAM(S): 2 INJECTION INTRAMUSCULAR; INTRAVENOUS; SUBCUTANEOUS at 13:55

## 2019-05-30 RX ADMIN — HYDROMORPHONE HYDROCHLORIDE 0.5 MILLIGRAM(S): 2 INJECTION INTRAMUSCULAR; INTRAVENOUS; SUBCUTANEOUS at 00:13

## 2019-05-30 RX ADMIN — Medication 15 MILLIGRAM(S): at 17:59

## 2019-05-30 RX ADMIN — FAMOTIDINE 20 MILLIGRAM(S): 10 INJECTION INTRAVENOUS at 18:06

## 2019-05-30 RX ADMIN — HYDROMORPHONE HYDROCHLORIDE 0.5 MILLIGRAM(S): 2 INJECTION INTRAMUSCULAR; INTRAVENOUS; SUBCUTANEOUS at 07:30

## 2019-05-30 RX ADMIN — HYDROMORPHONE HYDROCHLORIDE 1 MILLIGRAM(S): 2 INJECTION INTRAMUSCULAR; INTRAVENOUS; SUBCUTANEOUS at 18:15

## 2019-05-30 RX ADMIN — HYDROMORPHONE HYDROCHLORIDE 0.5 MILLIGRAM(S): 2 INJECTION INTRAMUSCULAR; INTRAVENOUS; SUBCUTANEOUS at 04:30

## 2019-05-30 RX ADMIN — SODIUM CHLORIDE 1000 MILLILITER(S): 9 INJECTION INTRAMUSCULAR; INTRAVENOUS; SUBCUTANEOUS at 19:45

## 2019-05-30 RX ADMIN — SODIUM CHLORIDE 100 MILLILITER(S): 9 INJECTION INTRAMUSCULAR; INTRAVENOUS; SUBCUTANEOUS at 01:25

## 2019-05-30 RX ADMIN — HYDROMORPHONE HYDROCHLORIDE 1 MILLIGRAM(S): 2 INJECTION INTRAMUSCULAR; INTRAVENOUS; SUBCUTANEOUS at 01:24

## 2019-05-30 RX ADMIN — HYDROMORPHONE HYDROCHLORIDE 1 MILLIGRAM(S): 2 INJECTION INTRAMUSCULAR; INTRAVENOUS; SUBCUTANEOUS at 18:00

## 2019-05-30 RX ADMIN — SENNA PLUS 2 TABLET(S): 8.6 TABLET ORAL at 22:19

## 2019-05-30 RX ADMIN — Medication 15 MILLIGRAM(S): at 18:15

## 2019-05-30 RX ADMIN — Medication 325 MILLIGRAM(S): at 18:01

## 2019-05-30 RX ADMIN — HYDROMORPHONE HYDROCHLORIDE 4 MILLIGRAM(S): 2 INJECTION INTRAMUSCULAR; INTRAVENOUS; SUBCUTANEOUS at 23:00

## 2019-05-30 RX ADMIN — HYDROMORPHONE HYDROCHLORIDE 0.5 MILLIGRAM(S): 2 INJECTION INTRAMUSCULAR; INTRAVENOUS; SUBCUTANEOUS at 05:00

## 2019-05-30 RX ADMIN — SODIUM CHLORIDE 100 MILLILITER(S): 9 INJECTION INTRAMUSCULAR; INTRAVENOUS; SUBCUTANEOUS at 08:51

## 2019-05-30 RX ADMIN — HYDROMORPHONE HYDROCHLORIDE 4 MILLIGRAM(S): 2 INJECTION INTRAMUSCULAR; INTRAVENOUS; SUBCUTANEOUS at 23:20

## 2019-05-30 RX ADMIN — OXYCODONE HYDROCHLORIDE 5 MILLIGRAM(S): 5 TABLET ORAL at 03:18

## 2019-05-30 RX ADMIN — ONDANSETRON 4 MILLIGRAM(S): 8 TABLET, FILM COATED ORAL at 08:48

## 2019-05-30 RX ADMIN — SODIUM CHLORIDE 125 MILLILITER(S): 9 INJECTION, SOLUTION INTRAVENOUS at 13:46

## 2019-05-30 RX ADMIN — Medication 100 MILLIGRAM(S): at 22:19

## 2019-05-30 RX ADMIN — Medication 15 MILLIGRAM(S): at 08:47

## 2019-05-30 RX ADMIN — OXYCODONE HYDROCHLORIDE 5 MILLIGRAM(S): 5 TABLET ORAL at 04:22

## 2019-05-30 RX ADMIN — HYDROMORPHONE HYDROCHLORIDE 4 MILLIGRAM(S): 2 INJECTION INTRAMUSCULAR; INTRAVENOUS; SUBCUTANEOUS at 22:28

## 2019-05-30 RX ADMIN — Medication 100 MILLIGRAM(S): at 17:59

## 2019-05-30 RX ADMIN — HYDROMORPHONE HYDROCHLORIDE 0.5 MILLIGRAM(S): 2 INJECTION INTRAMUSCULAR; INTRAVENOUS; SUBCUTANEOUS at 14:10

## 2019-05-30 NOTE — PHYSICAL THERAPY INITIAL EVALUATION ADULT - PRECAUTIONS/LIMITATIONS, REHAB EVAL
XR L Ankle: Comminuted, midshaft fibular fracture with anterior angulation of the largest distal fracture fragment. Anterior/lateral dislocation of the tibia with respect to the talus. Acute nondisplaced fracture of the medial malleolus.  CT tib/fib: Disruption of the ankle mortise with widening of the medial clear space. Impacted fracture along the posterior medial aspect of the tibial plafond with multiple curvilinear ossific fracture fragments noted along the site of impaction and extending into the medial clear space.  Acute comminuted fracture at the mid shaft of the left fibula.

## 2019-05-30 NOTE — DISCHARGE NOTE PROVIDER - NSDCCPGOAL_GEN_ALL_CORE_FT
To get better and follow your care plan as instructed. To get better and follow your care plan as instructed.  improved ambulation and pain control

## 2019-05-30 NOTE — BRIEF OPERATIVE NOTE - NSICDXBRIEFPROCEDURE_GEN_ALL_CORE_FT
PROCEDURES:  Open reduction and internal fixation (ORIF) of bimalleolar fracture of left ankle 30-May-2019 09:57:42  Yumiko Taylor

## 2019-05-30 NOTE — DISCHARGE NOTE PROVIDER - NSDCFUADDAPPT_GEN_ALL_CORE_FT
Follow up Dr Shook 10-14 days after surgery: re: wound check and suture removal  Follow up with your private internist / PMD in 4-6 weeks re: general checkup (and possible medication adjustment)   Please call for an appointment

## 2019-05-30 NOTE — PRE-OP CHECKLIST - 1.
Emotional support and pre-op teaching provided to patient  Emotional support and pre-op teaching provided to patient

## 2019-05-30 NOTE — DISCHARGE NOTE PROVIDER - NSDCFUADDINST_GEN_ALL_CORE_FT
Keep dressing/splint clean and dry   ice to affected incision every 4-6 hours x 72 hours   elevate affected extremity when @ rest   STRICT Non weight bearing L Leg    Continue ECOTRIN 325 mg by mouth 2x / day (at breakfast and at dinner) for a total of 6WEEKS Keep dressing/splint clean and dry   ice to affected incision every 4-6 hours x 72 hours   elevate affected extremity when @ rest   STRICT Non weight bearing L Leg    sponge bath only until OK w/ Surgeon  Call MD for excessive pain, persistent fevers, pain NOT relieved by medications.  Do not drive or lift any heavy objects     Continue ECOTRIN 325 mg by mouth 2x / day (at breakfast and at dinner) for a total of 6WEEKS

## 2019-05-30 NOTE — PHYSICAL THERAPY INITIAL EVALUATION ADULT - PASSIVE RANGE OF MOTION EXAMINATION, REHAB EVAL
deficits as listed below/no Passive ROM deficits were identified/L ankle in splint

## 2019-05-30 NOTE — CONSULT NOTE ADULT - SUBJECTIVE AND OBJECTIVE BOX
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 Patient was on break, outside, and had mechanical fall injuring left ankle. Denies HS/LOC. Denies pain/injury elsewhere. Denies numbness/tingling. Per ED, patient initially did not have pulses distally in LLE, conscious sedation and closed reduction was performed by ED physician, resulting in return of pulses. At time of interview, patient in LLE trilam splint, foot well perfused. Patient is a community ambulator at baseline. (29 May 2019 23:35)      MEDICATIONS  (STANDING):  amphetamine/dextroamphetamine XR 20 milliGRAM(s) Oral with breakfast  escitalopram 10 milliGRAM(s) Oral daily  hydrochlorothiazide 100 milliGRAM(s) Oral daily  losartan 100 milliGRAM(s) Oral daily  sodium chloride 0.9%. 1000 milliLiter(s) (100 mL/Hr) IV Continuous <Continuous>    MEDICATIONS  (PRN):  acetaminophen   Tablet .. 650 milliGRAM(s) Oral every 6 hours PRN Temp greater or equal to 38C (100.4F)  HYDROmorphone  Injectable 0.5 milliGRAM(s) IV Push every 3 hours PRN Severe Pain (7 - 10)  oxyCODONE    IR 10 milliGRAM(s) Oral every 4 hours PRN Moderate Pain (4 - 6)  oxyCODONE    IR 5 milliGRAM(s) Oral every 4 hours PRN Mild Pain (1 - 3)      Allergies    No Known Allergies    Intolerances      PAST MEDICAL HISTORY:  HTN (hypertension)  ADD  Hypothyroidism    PAST SURGICAL HISTORY:  hernia repairn     Diet:  (   ) Regular   ( x  ) Low Sodium   (   ) Low Cholesterol   (   ) Diabetic   (   ) Other    FAMILY HISTORY:  Colon cancer    SOCIAL HISTORY:  Substance Use: ( x ) never used  (  ) other:  Tobacco Usage:  (   ) never smoked   (   ) former smoker   (   ) current smoker  (   ) pack years  (   ) last cigarette date  Alcohol Usage:  Social  Advanced Directives: (   ) None    (   ) DNR    (   ) DNI    (   ) Health Care Proxy:     REVIEW OF SYSTEM:  CONSTITUTIONAL: No fever, No change in weight, No fatigue  HEAD: No headache, No dizziness, No recent trauma  EYES: No eye pain, No visual disturbances, No discharge  ENT:  No difficulty hearing, No tinnitus, No vertigo, No sinus pain, No throat pain  NECK: No pain, No stiffness  BREASTS: No pain, No masses, No nipple discharge  RESPIRATORY: No cough, No wheezing, No chills, No hemoptysis, No shortness of breath at rest or exertional shortness of breath  CARDIOVASCULAR: No chest pain, No palpitations, No dizziness, No CHF, No arrhythmia, No cardiomegaly, No leg swelling  GASTROINTESTINAL: No abdominal, No epigastric pain. No nausea, No vomiting, No hematemesis, No diarrhea, No constipation. No melena, No hematochezia. No GERD  GENITOURINARY: No dysuria, No frequency, No hematuria, No incontinence, No nocturia, No hesitancy,  SKIN: No itching, No burning, No rashes, No lesions   LYMPH NODES: No history of enlarged glands  ENDOCRINE: No heat or cold intolerance, No hair loss. No osteoporosis, No thyroid disease  MUSCULOSKELETAL: No joint pain or swelling,   (+) Left foot pain  PSYCHIATRIC: No depression, No anxiety, No mood swings, No difficulty sleeping  HEME/LYMPH: No easy bruising, No anticoagulants, No bleeding disorder, No bleeding gums  ALLERGY AND IMMUNOLOGIC: No hives, No eczema  NEUROLOGICAL: No memory loss, No loss of strength, No numbness, No tremors    VITALS:  Vital Signs Last 24 Hrs  T(C): 36.3 (29 May 2019 20:28), Max: 36.3 (29 May 2019 20:28)  T(F): 97.4 (29 May 2019 20:28), Max: 97.4 (29 May 2019 20:28)  HR: 72 (30 May 2019 04:35) (51 - 100)  BP: 127/60 (30 May 2019 04:35) (71/46 - 146/87)  BP(mean): --  RR: 18 (30 May 2019 04:35) (12 - 34)  SpO2: 99% (30 May 2019 04:35) (96% - 100%)  I&O's Summary      PHYSICAL EXAM:  GENERAL: NAD, well nourished and conversant  HEAD:  Atraumatic  EYES: EOM, PERRLA, conjunctiva pink and sclera white  ENT: No tonsillar erythema, exudates, or enlargement, moist mucous membranes, good dentition, no lesions  NECK: Supple, No JVD, normal thyroid, carotids with normal upstrokes and no bruits  CHEST/LUNG: Clear to auscultation bilaterally, No rales, rhonchi, wheezing, or rubs  HEART: Regular rate and rhythm, No murmurs, rubs, or gallops  ABDOMEN: Soft, nondistended, no masses, guarding, tenderness or rebound, bowel sounds present  EXTREMITIES:  2+ Peripheral Pulses, No clubbing, cyanosis, or edema.   SKIN: No rashes or lesions multiple tattoos   NERVOUS SYSTEM:  Alert & Oriented X3, normal cognitive function. Motor Strength 5/5 right upper and right lower.  5/5 left upper and left lower extremities, DTRs 2+ intact and symmetric    LABS:  NSR no acute changes      CBC Full  -  ( 30 May 2019 04:28 )  WBC Count : 8.2 K/uL  RBC Count : 4.65 M/uL  Hemoglobin : 14.6 g/dL  Hematocrit : 41.2 %  Platelet Count - Automated : 200 K/uL  Mean Cell Volume : 88.7 fl  Mean Cell Hemoglobin : 31.4 pg  Mean Cell Hemoglobin Concentration : 35.4 gm/dL  Auto Neutrophil # : x  Auto Lymphocyte # : x  Auto Monocyte # : x  Auto Eosinophil # : x  Auto Basophil # : x  Auto Neutrophil % : x  Auto Lymphocyte % : x  Auto Monocyte % : x  Auto Eosinophil % : x  Auto Basophil % : x    05-30    137  |  101  |  16  ----------------------------<  157<H>  3.8   |  23  |  1.02    Ca    8.3<L>      30 May 2019 04:28    TPro  6.5  /  Alb  4.4  /  TBili  0.6  /  DBili  x   /  AST  42<H>  /  ALT  55<H>  /  AlkPhos  50  05-29    LIVER FUNCTIONS - ( 29 May 2019 20:30 )  Alb: 4.4 g/dL / Pro: 6.5 g/dL / ALK PHOS: 50 U/L / ALT: 55 U/L / AST: 42 U/L / GGT: x           PT/INR - ( 30 May 2019 04:28 )   PT: 10.8 sec;   INR: 0.95 ratio         PTT - ( 30 May 2019 04:28 )  PTT:22.7 sec  Urinalysis Basic - ( 30 May 2019 04:38 )    Color: Yellow / Appearance: Clear / S.009 / pH: x  Gluc: x / Ketone: Negative  / Bili: Negative / Urobili: Negative   Blood: x / Protein: Negative / Nitrite: Negative   Leuk Esterase: Negative / RBC: x / WBC x   Sq Epi: x / Non Sq Epi: x / Bacteria: x      CAPILLARY BLOOD GLUCOSE          RADIOLOGY & ADDITIONAL TESTS:    Consultant(s):    Care Discussed with Consultants/Other Providers [ ] YES  [ ] NO

## 2019-05-30 NOTE — PHYSICAL THERAPY INITIAL EVALUATION ADULT - PERTINENT HX OF CURRENT PROBLEM, REHAB EVAL
Pt is a 49 y/o male admitted to 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 Denies HS/LOC. Per ED, patient initially did not have pulses distally in LLE, conscious sedation and closed reduction was performed by ED physician, resulting in return of pulses. At time of interview, patient in LLE trilam splint, foot well perfused.

## 2019-05-30 NOTE — DISCHARGE NOTE PROVIDER - REASON FOR ADMISSION
Left ankle fracture  Open Reduction Internal Fixation / Surgical Repair L Ankle Left ankle Fx/Dx Left ankle Fx/Dx-ORIF

## 2019-05-30 NOTE — DISCHARGE NOTE PROVIDER - NSDCCPTREATMENT_GEN_ALL_CORE_FT
PRINCIPAL PROCEDURE  Procedure: Open reduction and internal fixation (ORIF) of bimalleolar fracture of left ankle  Findings and Treatment:

## 2019-05-30 NOTE — CHART NOTE - NSCHARTNOTEFT_GEN_A_CORE
Seen in RR  Block still in place   No Chest Pain, SOB, N/V.    T(C): 36.4 (05-30-19 @ 13:30), Max: 37.2 (05-30-19 @ 07:30)  HR: 79 (05-30-19 @ 14:10) (51 - 100)  BP: 112/57 (05-30-19 @ 14:10) (71/46 - 146/87)  RR: 18 (05-30-19 @ 14:10) (12 - 34)  SpO2: 99% (05-30-19 @ 14:10) (94% - 100%)  Wt(kg): --    Exam:  Alert and [place], No Acute Distress  Card: +S1/S2, RRR  Pulm: CTAB  Abdomen soft / benign  EXT        Dressing/plint  C/D  [x ]            Calves soft       (+) Decreased motor and sensory 2/2 anesthesia / block            digits: warm / well-perfused            cap refill brisk @ 2-3 secs             2+ pulses                   14.9   7.5   )-----------( 176      ( 30 May 2019 13:21 )             44.4      05-30    137  |  101  |  14  ----------------------------<  153<H>  3.9   |  23  |  1.16        A/P: S/p Open reduction and internal fixation (ORIF) of bimalleolar fracture of left ankle    - Serial n/v checks  - PT: NWB  -Chk AM Labs  -DVT PPx: Ecotrin  BID  -Pain Control PO/IV Pain Rx  -Continue Current Tx: Pain Rx adjusted  -Dispo planning: anticipate home later today or in am      ***See Above  Oz KEITH  Orthopedics  B: [id number]  S: [phone] Seen in RR  Block still in place   No Chest Pain, SOB, N/V.    T(C): 36.4 (05-30-19 @ 13:30), Max: 37.2 (05-30-19 @ 07:30)  HR: 79 (05-30-19 @ 14:10) (51 - 100)  BP: 112/57 (05-30-19 @ 14:10) (71/46 - 146/87)  RR: 18 (05-30-19 @ 14:10) (12 - 34)  SpO2: 99% (05-30-19 @ 14:10) (94% - 100%)  Wt(kg): --    Exam:  Alert and [place], No Acute Distress  Card: +S1/S2, RRR  Pulm: CTAB  Abdomen soft / benign  EXT        Dressing/plint  C/D  [x ]            Calves soft       (+) Decreased motor and sensory 2/2 anesthesia / block            digits: warm / well-perfused            cap refill brisk @ 2-3 secs             2+ pulses                   14.9   7.5   )-----------( 176      ( 30 May 2019 13:21 )             44.4      05-30    137  |  101  |  14  ----------------------------<  153<H>  3.9   |  23  |  1.16        A/P: S/p Open reduction and internal fixation (ORIF) of bimalleolar fracture of left ankle    - Serial n/v checks  - PT: NWB  -Chk AM Labs  -DVT PPx: Ecotrin  BID  -Pain Control PO/IV Pain Rx  -Continue Current Tx: Pain Rx adjusted  -Dispo planning: anticipate home later today      ***See Above  Oz KEITH  Orthopedics  B: [id number]  S: [phone]

## 2019-05-30 NOTE — ED ADULT NURSE REASSESSMENT NOTE - NS ED NURSE REASSESS COMMENT FT1
Report received from MARY Kuo  Pt resting comfortably with family at bedside.   at xray  Will continue to monitor closely.
ortho contacted at [phone], states will order something for pain
ortho paged again at [phone], awaiting call back
ortho paged again in regards to pts pain  awaiting call back
pt crying in pain  ortho resident contacted to request pain medicine, currently in between PRN orders  pt able to move toes, no swelling or discoloration of toes noted  lower extremity remains elevated
pt stating pain medicine did not relieve pain  AM labs drawn and sent
pt used bedside urinal and moved splinted extremetiy  complaining of pain again  medicated as per MD orders

## 2019-05-30 NOTE — DISCHARGE NOTE PROVIDER - NSDCACTIVITY_GEN_ALL_CORE
Do not drive or operate machinery/Stairs allowed/Walking - Outdoors allowed/Walking - Indoors allowed/No heavy lifting/straining

## 2019-05-30 NOTE — PHYSICAL THERAPY INITIAL EVALUATION ADULT - ADDITIONAL COMMENTS
Pt lives with fiance in a private house with 5 steps to enter and one flight of steps inside. Pt was Ind with all ADLs and amb without AD.

## 2019-05-30 NOTE — DISCHARGE NOTE PROVIDER - HOSPITAL COURSE
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 Denies HS/LOC. Denies pain/injury elsewhere. Denies numbness/tingling. Per ED, patient initially did not have pulses distally in LLE, conscious sedation and closed reduction was performed by ED physician, resulting in return of pulses. At time of interview, patient in LLE trilam splint, foot well perfused. Patient is a community ambulator at baseline. 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 Denies HS/LOC. Denies pain/injury elsewhere. Denies numbness/tingling. Per ED, patient initially did not have pulses distally in LLE, conscious sedation and closed reduction was performed by ED physician, resulting in return of pulses. At time of interview, patient in LLE trilam splint, foot well perfused. Patient is a community ambulator at baseline.    Allergies and Intolerances:          Allergies:    	No Known Allergies:         Home Medications:     * Patient Currently Takes Medications as of 29-May-2019 20:28 documented in Structured Notes    · 	Lexapro: Last Dose Taken:      · 	aspirin: Last Dose Taken:      · 	hydrochlorothiazide-losartan: Last Dose Taken:      · 	Adderall: Last Dose Taken:          .        Patient History:      Past Medical, Past Surgical, and Family History:    PAST MEDICAL HISTORY:    HTN (hypertension).        Hospital Course:     5/29:  Pt admitted and seen by medicine prior to OR.    5/30: Underwent Open Reduction Internal Fixation / Surgical Repair L ankle w/ Dr Shook. No complications noted. Eval by PT: NWB LLE; suggest home w/ outpatient PT. 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 Patient was on break, outside, and had mechanical fall injuring left ankle. Denies HS/LOC. Denies pain/injury elsewhere. Denies numbness/tingling. Per ED, patient initially did not have pulses distally in LLE, conscious sedation and closed reduction was performed by ED physician, resulting in return of pulses. At time of interview, patient in LLE trilam splint, foot well perfused. Patient is a community ambulator at baseline.    Allergies and Intolerances:          Allergies:    	No Known Allergies:         Home Medications:     * Patient Currently Takes Medications as of 29-May-2019 20:28 documented in Structured Notes    · 	Lexapro: Last Dose Taken:      · 	aspirin: Last Dose Taken:      · 	hydrochlorothiazide-losartan: Last Dose Taken:      · 	Adderall: Last Dose Taken:          .        Patient History:      Past Medical, Past Surgical, and Family History:    PAST MEDICAL HISTORY:    HTN (hypertension).        Hospital Course:     5/29:  Pt admitted and seen by medicine prior to OR.    5/30: Underwent Open Reduction Internal Fixation / Surgical Repair L ankle w/ Dr Shook. No complications noted. Eval by PT: NWKEISHA FOWLER; suggest home w/ outpatient PT. Pt opted to stay overnight for pain for pain control    6/1:  Remain of hospital stay unremarkable, and patient discharged home.

## 2019-05-30 NOTE — CONSULT NOTE ADULT - ASSESSMENT
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 Denies HS/LOC. Denies pain/injury elsewhere. Denies numbness/tingling. Per ED, patient initially did not have pulses distally in LLE, conscious sedation and closed reduction was performed by ED physician, resulting in return of pulses. At time of interview, patient in LLE trilam splint, foot well perfused. Patient is a community ambulator at baseline. (29 May 2019 23:35)  Patient suffered an acute comminuted left fibula fracture requiring surgery.  There is no medical contraindication to surgery as planned.

## 2019-05-30 NOTE — DISCHARGE NOTE PROVIDER - CARE PROVIDER_API CALL
Shade Shook (MD)  Orthopaedic Surgery  [address]  [place] [address]  Phone: [phone]  Fax: [phone]  Follow Up Time:

## 2019-05-31 LAB
ANION GAP SERPL CALC-SCNC: 11 MMOL/L — SIGNIFICANT CHANGE UP (ref 5–17)
BUN SERPL-MCNC: 12 MG/DL — SIGNIFICANT CHANGE UP (ref 7–23)
CALCIUM SERPL-MCNC: 8.2 MG/DL — LOW (ref 8.4–10.5)
CHLORIDE SERPL-SCNC: 100 MMOL/L — SIGNIFICANT CHANGE UP (ref 96–108)
CO2 SERPL-SCNC: 25 MMOL/L — SIGNIFICANT CHANGE UP (ref 22–31)
CREAT SERPL-MCNC: 1.08 MG/DL — SIGNIFICANT CHANGE UP (ref 0.5–1.3)
GLUCOSE SERPL-MCNC: 95 MG/DL — SIGNIFICANT CHANGE UP (ref 70–99)
HCT VFR BLD CALC: 40.5 % — SIGNIFICANT CHANGE UP (ref 39–50)
HGB BLD-MCNC: 13.4 G/DL — SIGNIFICANT CHANGE UP (ref 13–17)
MCHC RBC-ENTMCNC: 30 PG — SIGNIFICANT CHANGE UP (ref 27–34)
MCHC RBC-ENTMCNC: 33.1 GM/DL — SIGNIFICANT CHANGE UP (ref 32–36)
MCV RBC AUTO: 90.6 FL — SIGNIFICANT CHANGE UP (ref 80–100)
PLATELET # BLD AUTO: 168 K/UL — SIGNIFICANT CHANGE UP (ref 150–400)
POTASSIUM SERPL-MCNC: 3.5 MMOL/L — SIGNIFICANT CHANGE UP (ref 3.5–5.3)
POTASSIUM SERPL-SCNC: 3.5 MMOL/L — SIGNIFICANT CHANGE UP (ref 3.5–5.3)
RBC # BLD: 4.47 M/UL — SIGNIFICANT CHANGE UP (ref 4.2–5.8)
RBC # FLD: 13.3 % — SIGNIFICANT CHANGE UP (ref 10.3–14.5)
SODIUM SERPL-SCNC: 136 MMOL/L — SIGNIFICANT CHANGE UP (ref 135–145)
WBC # BLD: 8.9 K/UL — SIGNIFICANT CHANGE UP (ref 3.8–10.5)
WBC # FLD AUTO: 8.9 K/UL — SIGNIFICANT CHANGE UP (ref 3.8–10.5)

## 2019-05-31 RX ORDER — MORPHINE SULFATE 50 MG/1
15 CAPSULE, EXTENDED RELEASE ORAL EVERY 8 HOURS
Refills: 0 | Status: DISCONTINUED | OUTPATIENT
Start: 2019-05-31 | End: 2019-06-01

## 2019-05-31 RX ORDER — ACETAMINOPHEN 500 MG
1000 TABLET ORAL ONCE
Refills: 0 | Status: DISCONTINUED | OUTPATIENT
Start: 2019-05-31 | End: 2019-05-31

## 2019-05-31 RX ORDER — MORPHINE SULFATE 50 MG/1
15 CAPSULE, EXTENDED RELEASE ORAL ONCE
Refills: 0 | Status: DISCONTINUED | OUTPATIENT
Start: 2019-05-31 | End: 2019-05-31

## 2019-05-31 RX ORDER — HYDROMORPHONE HYDROCHLORIDE 2 MG/ML
1 INJECTION INTRAMUSCULAR; INTRAVENOUS; SUBCUTANEOUS ONCE
Refills: 0 | Status: DISCONTINUED | OUTPATIENT
Start: 2019-05-31 | End: 2019-05-31

## 2019-05-31 RX ORDER — HYDROMORPHONE HYDROCHLORIDE 2 MG/ML
0.5 INJECTION INTRAMUSCULAR; INTRAVENOUS; SUBCUTANEOUS ONCE
Refills: 0 | Status: DISCONTINUED | OUTPATIENT
Start: 2019-05-31 | End: 2019-05-31

## 2019-05-31 RX ORDER — ZOLPIDEM TARTRATE 10 MG/1
5 TABLET ORAL AT BEDTIME
Refills: 0 | Status: DISCONTINUED | OUTPATIENT
Start: 2019-05-31 | End: 2019-06-01

## 2019-05-31 RX ORDER — ACETAMINOPHEN 500 MG
500 TABLET ORAL ONCE
Refills: 0 | Status: COMPLETED | OUTPATIENT
Start: 2019-05-31 | End: 2019-05-31

## 2019-05-31 RX ORDER — TRAMADOL HYDROCHLORIDE 50 MG/1
50 TABLET ORAL EVERY 6 HOURS
Refills: 0 | Status: DISCONTINUED | OUTPATIENT
Start: 2019-05-31 | End: 2019-06-01

## 2019-05-31 RX ORDER — MORPHINE SULFATE 50 MG/1
15 CAPSULE, EXTENDED RELEASE ORAL
Refills: 0 | Status: DISCONTINUED | OUTPATIENT
Start: 2019-05-31 | End: 2019-05-31

## 2019-05-31 RX ADMIN — MORPHINE SULFATE 15 MILLIGRAM(S): 50 CAPSULE, EXTENDED RELEASE ORAL at 12:05

## 2019-05-31 RX ADMIN — MORPHINE SULFATE 15 MILLIGRAM(S): 50 CAPSULE, EXTENDED RELEASE ORAL at 22:20

## 2019-05-31 RX ADMIN — SENNA PLUS 2 TABLET(S): 8.6 TABLET ORAL at 21:53

## 2019-05-31 RX ADMIN — Medication 200 MILLIGRAM(S): at 19:50

## 2019-05-31 RX ADMIN — HYDROMORPHONE HYDROCHLORIDE 1 MILLIGRAM(S): 2 INJECTION INTRAMUSCULAR; INTRAVENOUS; SUBCUTANEOUS at 07:44

## 2019-05-31 RX ADMIN — MORPHINE SULFATE 15 MILLIGRAM(S): 50 CAPSULE, EXTENDED RELEASE ORAL at 13:36

## 2019-05-31 RX ADMIN — MORPHINE SULFATE 15 MILLIGRAM(S): 50 CAPSULE, EXTENDED RELEASE ORAL at 21:51

## 2019-05-31 RX ADMIN — Medication 100 MILLIGRAM(S): at 21:53

## 2019-05-31 RX ADMIN — HYDROMORPHONE HYDROCHLORIDE 4 MILLIGRAM(S): 2 INJECTION INTRAMUSCULAR; INTRAVENOUS; SUBCUTANEOUS at 13:03

## 2019-05-31 RX ADMIN — Medication 325 MILLIGRAM(S): at 05:37

## 2019-05-31 RX ADMIN — HYDROMORPHONE HYDROCHLORIDE 4 MILLIGRAM(S): 2 INJECTION INTRAMUSCULAR; INTRAVENOUS; SUBCUTANEOUS at 13:31

## 2019-05-31 RX ADMIN — FAMOTIDINE 20 MILLIGRAM(S): 10 INJECTION INTRAVENOUS at 05:37

## 2019-05-31 RX ADMIN — Medication 100 MILLIGRAM(S): at 12:07

## 2019-05-31 RX ADMIN — Medication 500 MILLIGRAM(S): at 20:20

## 2019-05-31 RX ADMIN — HYDROMORPHONE HYDROCHLORIDE 1 MILLIGRAM(S): 2 INJECTION INTRAMUSCULAR; INTRAVENOUS; SUBCUTANEOUS at 08:15

## 2019-05-31 RX ADMIN — ESCITALOPRAM OXALATE 10 MILLIGRAM(S): 10 TABLET, FILM COATED ORAL at 12:07

## 2019-05-31 RX ADMIN — Medication 1 TABLET(S): at 12:05

## 2019-05-31 RX ADMIN — TRAMADOL HYDROCHLORIDE 50 MILLIGRAM(S): 50 TABLET ORAL at 16:04

## 2019-05-31 RX ADMIN — HYDROMORPHONE HYDROCHLORIDE 4 MILLIGRAM(S): 2 INJECTION INTRAMUSCULAR; INTRAVENOUS; SUBCUTANEOUS at 10:21

## 2019-05-31 RX ADMIN — HYDROMORPHONE HYDROCHLORIDE 2 MILLIGRAM(S): 2 INJECTION INTRAMUSCULAR; INTRAVENOUS; SUBCUTANEOUS at 07:15

## 2019-05-31 RX ADMIN — HYDROMORPHONE HYDROCHLORIDE 2 MILLIGRAM(S): 2 INJECTION INTRAMUSCULAR; INTRAVENOUS; SUBCUTANEOUS at 03:20

## 2019-05-31 RX ADMIN — HYDROMORPHONE HYDROCHLORIDE 1 MILLIGRAM(S): 2 INJECTION INTRAMUSCULAR; INTRAVENOUS; SUBCUTANEOUS at 16:49

## 2019-05-31 RX ADMIN — HYDROMORPHONE HYDROCHLORIDE 2 MILLIGRAM(S): 2 INJECTION INTRAMUSCULAR; INTRAVENOUS; SUBCUTANEOUS at 02:49

## 2019-05-31 RX ADMIN — FAMOTIDINE 20 MILLIGRAM(S): 10 INJECTION INTRAVENOUS at 16:00

## 2019-05-31 RX ADMIN — Medication 100 MILLIGRAM(S): at 02:17

## 2019-05-31 RX ADMIN — MORPHINE SULFATE 15 MILLIGRAM(S): 50 CAPSULE, EXTENDED RELEASE ORAL at 13:02

## 2019-05-31 RX ADMIN — ONDANSETRON 4 MILLIGRAM(S): 8 TABLET, FILM COATED ORAL at 18:29

## 2019-05-31 RX ADMIN — TRAMADOL HYDROCHLORIDE 50 MILLIGRAM(S): 50 TABLET ORAL at 16:01

## 2019-05-31 RX ADMIN — HYDROMORPHONE HYDROCHLORIDE 4 MILLIGRAM(S): 2 INJECTION INTRAMUSCULAR; INTRAVENOUS; SUBCUTANEOUS at 20:20

## 2019-05-31 RX ADMIN — Medication 325 MILLIGRAM(S): at 16:03

## 2019-05-31 RX ADMIN — HYDROMORPHONE HYDROCHLORIDE 4 MILLIGRAM(S): 2 INJECTION INTRAMUSCULAR; INTRAVENOUS; SUBCUTANEOUS at 22:53

## 2019-05-31 RX ADMIN — HYDROMORPHONE HYDROCHLORIDE 2 MILLIGRAM(S): 2 INJECTION INTRAMUSCULAR; INTRAVENOUS; SUBCUTANEOUS at 06:42

## 2019-05-31 RX ADMIN — HYDROMORPHONE HYDROCHLORIDE 4 MILLIGRAM(S): 2 INJECTION INTRAMUSCULAR; INTRAVENOUS; SUBCUTANEOUS at 09:51

## 2019-05-31 RX ADMIN — TRAMADOL HYDROCHLORIDE 50 MILLIGRAM(S): 50 TABLET ORAL at 22:20

## 2019-05-31 RX ADMIN — HYDROMORPHONE HYDROCHLORIDE 1 MILLIGRAM(S): 2 INJECTION INTRAMUSCULAR; INTRAVENOUS; SUBCUTANEOUS at 17:05

## 2019-05-31 RX ADMIN — TRAMADOL HYDROCHLORIDE 50 MILLIGRAM(S): 50 TABLET ORAL at 21:50

## 2019-05-31 RX ADMIN — Medication 100 MILLIGRAM(S): at 05:37

## 2019-05-31 RX ADMIN — HYDROMORPHONE HYDROCHLORIDE 4 MILLIGRAM(S): 2 INJECTION INTRAMUSCULAR; INTRAVENOUS; SUBCUTANEOUS at 19:49

## 2019-06-01 ENCOUNTER — TRANSCRIPTION ENCOUNTER (OUTPATIENT)
Age: 49
End: 2019-06-01

## 2019-06-01 VITALS
DIASTOLIC BLOOD PRESSURE: 72 MMHG | RESPIRATION RATE: 18 BRPM | TEMPERATURE: 100 F | HEART RATE: 79 BPM | OXYGEN SATURATION: 95 % | SYSTOLIC BLOOD PRESSURE: 123 MMHG

## 2019-06-01 DIAGNOSIS — S82.892A OTHER FRACTURE OF LEFT LOWER LEG, INITIAL ENCOUNTER FOR CLOSED FRACTURE: ICD-10-CM

## 2019-06-01 RX ORDER — MORPHINE SULFATE 50 MG/1
1 CAPSULE, EXTENDED RELEASE ORAL
Qty: 21 | Refills: 0
Start: 2019-06-01

## 2019-06-01 RX ORDER — ASPIRIN/CALCIUM CARB/MAGNESIUM 324 MG
1 TABLET ORAL
Qty: 84 | Refills: 0
Start: 2019-06-01 | End: 2019-07-12

## 2019-06-01 RX ORDER — HYDROMORPHONE HYDROCHLORIDE 2 MG/ML
1 INJECTION INTRAMUSCULAR; INTRAVENOUS; SUBCUTANEOUS
Qty: 60 | Refills: 0
Start: 2019-06-01

## 2019-06-01 RX ORDER — SENNA PLUS 8.6 MG/1
2 TABLET ORAL
Qty: 0 | Refills: 0 | DISCHARGE
Start: 2019-06-01

## 2019-06-01 RX ORDER — ONDANSETRON 8 MG/1
1 TABLET, FILM COATED ORAL
Qty: 21 | Refills: 0
Start: 2019-06-01 | End: 2019-06-07

## 2019-06-01 RX ADMIN — HYDROMORPHONE HYDROCHLORIDE 4 MILLIGRAM(S): 2 INJECTION INTRAMUSCULAR; INTRAVENOUS; SUBCUTANEOUS at 08:23

## 2019-06-01 RX ADMIN — HYDROMORPHONE HYDROCHLORIDE 4 MILLIGRAM(S): 2 INJECTION INTRAMUSCULAR; INTRAVENOUS; SUBCUTANEOUS at 06:00

## 2019-06-01 RX ADMIN — Medication 100 MILLIGRAM(S): at 05:29

## 2019-06-01 RX ADMIN — HYDROMORPHONE HYDROCHLORIDE 4 MILLIGRAM(S): 2 INJECTION INTRAMUSCULAR; INTRAVENOUS; SUBCUTANEOUS at 02:26

## 2019-06-01 RX ADMIN — MORPHINE SULFATE 15 MILLIGRAM(S): 50 CAPSULE, EXTENDED RELEASE ORAL at 05:27

## 2019-06-01 RX ADMIN — MORPHINE SULFATE 15 MILLIGRAM(S): 50 CAPSULE, EXTENDED RELEASE ORAL at 06:00

## 2019-06-01 RX ADMIN — Medication 100 MILLIGRAM(S): at 05:27

## 2019-06-01 RX ADMIN — HYDROMORPHONE HYDROCHLORIDE 4 MILLIGRAM(S): 2 INJECTION INTRAMUSCULAR; INTRAVENOUS; SUBCUTANEOUS at 05:29

## 2019-06-01 RX ADMIN — TRAMADOL HYDROCHLORIDE 50 MILLIGRAM(S): 50 TABLET ORAL at 05:00

## 2019-06-01 RX ADMIN — ZOLPIDEM TARTRATE 5 MILLIGRAM(S): 10 TABLET ORAL at 00:03

## 2019-06-01 RX ADMIN — HYDROMORPHONE HYDROCHLORIDE 4 MILLIGRAM(S): 2 INJECTION INTRAMUSCULAR; INTRAVENOUS; SUBCUTANEOUS at 08:57

## 2019-06-01 RX ADMIN — FAMOTIDINE 20 MILLIGRAM(S): 10 INJECTION INTRAVENOUS at 05:29

## 2019-06-01 RX ADMIN — TRAMADOL HYDROCHLORIDE 50 MILLIGRAM(S): 50 TABLET ORAL at 04:27

## 2019-06-01 RX ADMIN — ONDANSETRON 4 MILLIGRAM(S): 8 TABLET, FILM COATED ORAL at 05:39

## 2019-06-01 RX ADMIN — Medication 325 MILLIGRAM(S): at 05:33

## 2019-06-01 RX ADMIN — LOSARTAN POTASSIUM 100 MILLIGRAM(S): 100 TABLET, FILM COATED ORAL at 05:29

## 2019-06-01 RX ADMIN — HYDROMORPHONE HYDROCHLORIDE 4 MILLIGRAM(S): 2 INJECTION INTRAMUSCULAR; INTRAVENOUS; SUBCUTANEOUS at 03:00

## 2019-06-01 RX ADMIN — TRAMADOL HYDROCHLORIDE 50 MILLIGRAM(S): 50 TABLET ORAL at 09:43

## 2019-06-01 NOTE — PROGRESS NOTE ADULT - ASSESSMENT
47 YO M s/p ORIF L ankle Fx POD1  Pain control  DVT ppx  NWB LLE  PT  Dipso planning
A/p: 48yMale s/p L ankle ORIF.  VSS. NAD.
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 Denies HS/LOC. Denies pain/injury elsewhere. Denies numbness/tingling. Per ED, patient initially did not have pulses distally in LLE, conscious sedation and closed reduction was performed by ED physician, resulting in return of pulses. At time of interview, patient in LLE trilam splint, foot well perfused. Patient is a community ambulator at baseline. Patient seen s/p Open reduction and internal fixation of ankle. tolerated procedure well.
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 Denies HS/LOC. Denies pain/injury elsewhere. Denies numbness/tingling. Per ED, patient initially did not have pulses distally in LLE, conscious sedation and closed reduction was performed by ED physician, resulting in return of pulses. At time of interview, patient in LLE trilam splint, foot well perfused. Patient is a community ambulator at baseline. Patient seen s/p Open reduction and internal fixation of ankle. tolerated procedure well. sitting up in bed  NO sob chest pain   encouraged incentive spirometry .
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 Denies HS/LOC. Denies pain/injury elsewhere. Denies numbness/tingling. Per ED, patient initially did not have pulses distally in LLE, conscious sedation and closed reduction was performed by ED physician, resulting in return of pulses. At time of interview, patient in LLE trilam splint, foot well perfused. Patient is a community ambulator at baseline. Patient seen s/p Open reduction and internal fixation of ankle. tolerated procedure well. sitting up in bed  NO sob chest pain   encouraged incentive spirometry .

## 2019-06-01 NOTE — PROGRESS NOTE ADULT - REASON FOR ADMISSION
Left ankle Fx/Dx
Left ankle Fx/Dx-ORIF
Left ankle Fx/Dx
Left ankle Fx/Dx

## 2019-06-01 NOTE — PROGRESS NOTE ADULT - PROBLEM SELECTOR PLAN 4
continue current dose of synthroids

## 2019-06-01 NOTE — PROGRESS NOTE ADULT - ATTENDING COMMENTS
Pt seen and examined.  Exam and plan as above
Pt seen and examined.  Exam and plan as above
Patient dced home  continue current meds  PO as tolerated  activity as per ortho  Patient aware of plan

## 2019-06-01 NOTE — PROGRESS NOTE ADULT - PROBLEM SELECTOR PLAN 2
continue current meds  adjust meds as needed

## 2019-06-01 NOTE — DISCHARGE NOTE NURSING/CASE MANAGEMENT/SOCIAL WORK - NSDCDPATPORTLINK_GEN_ALL_CORE
You can access the [facility], offered by [facility], by registering with the following website: [email or web address]

## 2019-06-01 NOTE — PROGRESS NOTE ADULT - PROBLEM SELECTOR PROBLEM 3
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

## 2019-06-01 NOTE — PROGRESS NOTE ADULT - PROBLEM SELECTOR PLAN 1
PT/OT-NWB LLE  IS  DVT PPx  Pain Control  Continue Current Tx.  Home today    Donte Novak PA-C  Team Pager: #[phone]
tolerated procedure well  pain meds as needed  DVT and GI prophylaxis
tolerated procedure well  pain meds as needed  DVT and GI prophylaxis  Deep breathing exercises
tolerated procedure well  pain meds as needed  DVT and GI prophylaxis  Deep breathing exercises

## 2019-06-01 NOTE — PROGRESS NOTE ADULT - SUBJECTIVE AND OBJECTIVE BOX
ORTHO ATTENDING POST OP    s/p ORIF L ankle  NWB L  LE  AO splint  elevation   BID  CBC in RR and AM  Ancef 1 g x 24h  PT consult for crutch training  OOB to chair in AM   Pt can be d/c'd today if cleared by PT
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 Patient was on break, outside, and had mechanical fall injuring left ankle. Denies HS/LOC. Denies pain/injury elsewhere. Denies numbness/tingling. Per ED, patient initially did not have pulses distally in LLE, conscious sedation and closed reduction was performed by ED physician, resulting in return of pulses. At time of interview, patient in LLE trilam splint, foot well perfused. Patient is a community ambulator at baseline. (29 May 2019 23:35)Pt tolerated procedure well. states pain is well controlled.     MEDICATIONS  (STANDING):  amphetamine/dextroamphetamine XR 20 milliGRAM(s) Oral with breakfast  aspirin enteric coated 325 milliGRAM(s) Oral two times a day  ceFAZolin   IVPB 2000 milliGRAM(s) IV Intermittent every 8 hours  docusate sodium 100 milliGRAM(s) Oral three times a day  escitalopram 10 milliGRAM(s) Oral daily  famotidine    Tablet 20 milliGRAM(s) Oral two times a day  hydrochlorothiazide 100 milliGRAM(s) Oral daily  lactated ringers. 1000 milliLiter(s) (125 mL/Hr) IV Continuous <Continuous>  losartan 100 milliGRAM(s) Oral daily  multivitamin 1 Tablet(s) Oral daily  senna 2 Tablet(s) Oral at bedtime    MEDICATIONS  (PRN):  HYDROmorphone   Tablet 2 milliGRAM(s) Oral every 3 hours PRN Moderate Pain (4 - 6)  HYDROmorphone   Tablet 4 milliGRAM(s) Oral every 3 hours PRN Severe Pain (7 - 10)  HYDROmorphone  Injectable 1 milliGRAM(s) IV Push every 4 hours PRN breakthrough  magnesium hydroxide Suspension 30 milliLiter(s) Oral daily PRN Constipation  ondansetron Injectable 4 milliGRAM(s) IV Push every 6 hours PRN Nausea and/or Vomiting  traMADol 50 milliGRAM(s) Oral every 8 hours PRN Mild Pain (1 - 3)        Vital Signs Last 24 Hrs  T(C): 36.7 (31 May 2019 05:07), Max: 37.3 (30 May 2019 17:05)  T(F): 98 (31 May 2019 05:07), Max: 99.2 (30 May 2019 17:05)  HR: 77 (31 May 2019 05:07) (66 - 92)  BP: 116/68 (31 May 2019 05:07) (95/53 - 144/54)  BP(mean): 77 (30 May 2019 16:00) (77 - 92)  RR: 18 (31 May 2019 05:07) (16 - 18)  SpO2: 95% (31 May 2019 05:07) (92% - 100%  -      PHYSICAL EXAM:  GENERAL: NAD, well nourished and conversant  HEAD:  Atraumatic  EYES: EOM, PERRLA, conjunctiva pink and sclera white  ENT: No tonsillar erythema, exudates, or enlargement, moist mucous membranes, good dentition, no lesions  NECK: Supple, No JVD, normal thyroid, carotids with normal upstrokes and no bruits  CHEST/LUNG: Clear to auscultation bilaterally, No rales, rhonchi, wheezing, or rubs  HEART: Regular rate and rhythm, No murmurs, rubs, or gallops  ABDOMEN: Soft, nondistended, no masses, guarding, tenderness or rebound, bowel sounds present  EXTREMITIES:  2+ Peripheral Pulses, No clubbing, cyanosis, or edema  s/p ORIF L ankle Fx POD1  SKIN: No rashes or lesions multiple tattoos   NERVOUS SYSTEM:  Alert & Oriented X3, normal cognitive function. Motor Strength 5/5 right upper and right lower.  5/5 left upper and left lower extremities, DTRs 2+ intact and symmetric  LABS:          CBC Full  -  ( 31 May 2019 10:09 )  WBC Count : 8.90 K/uL  RBC Count : 4.47 M/uL  Hemoglobin : 13.4 g/dL  Hematocrit : 40.5 %  Platelet Count - Automated : 168 K/uL  Mean Cell Volume : 90.6 fl  Mean Cell Hemoglobin : 30.0 pg  Mean Cell Hemoglobin Concentration : 33.1 gm/dL  Auto Neutrophil # : x  Auto Lymphocyte # : x  Auto Monocyte # : x  Auto Eosinophil # : x  Auto Basophil # : x  Auto Neutrophil % : x  Auto Lymphocyte % : x  Auto Monocyte % : x  Auto Eosinophil % : x  Auto Basophil % : x    05-31    136  |  100  |  12  ----------------------------<  95  3.5   |  25  |  1.08    Ca    8.2<L>      31 May 2019 06:48          CAPILLARY BLOOD GLUCOSE          RADIOLOGY & ADDITIONAL TESTS:
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 Denies HS/LOC. Denies pain/injury elsewhere. Denies numbness/tingling. Per ED, patient initially did not have pulses distally in LLE, conscious sedation and closed reduction was performed by ED physician, resulting in return of pulses. At time of interview, patient in LLE trilam splint, foot well perfused. Patient is a community ambulator at baseline. (29 May 2019 23:35)Pt tolerated procedure well. states pain is well controlled.     MEDICATIONS  (STANDING):  amphetamine/dextroamphetamine XR 20 milliGRAM(s) Oral with breakfast  aspirin enteric coated 325 milliGRAM(s) Oral two times a day  ceFAZolin   IVPB 2000 milliGRAM(s) IV Intermittent every 8 hours  docusate sodium 100 milliGRAM(s) Oral three times a day  escitalopram 10 milliGRAM(s) Oral daily  famotidine    Tablet 20 milliGRAM(s) Oral two times a day  hydrochlorothiazide 100 milliGRAM(s) Oral daily  lactated ringers. 1000 milliLiter(s) (125 mL/Hr) IV Continuous <Continuous>  losartan 100 milliGRAM(s) Oral daily  multivitamin 1 Tablet(s) Oral daily  senna 2 Tablet(s) Oral at bedtime    MEDICATIONS  (PRN):  HYDROmorphone   Tablet 2 milliGRAM(s) Oral every 3 hours PRN Moderate Pain (4 - 6)  HYDROmorphone   Tablet 4 milliGRAM(s) Oral every 3 hours PRN Severe Pain (7 - 10)  HYDROmorphone  Injectable 1 milliGRAM(s) IV Push every 4 hours PRN breakthrough  magnesium hydroxide Suspension 30 milliLiter(s) Oral daily PRN Constipation  ondansetron Injectable 4 milliGRAM(s) IV Push every 6 hours PRN Nausea and/or Vomiting  traMADol 50 milliGRAM(s) Oral every 8 hours PRN Mild Pain (1 - 3)          VITALS:   T(C): 36.5 (19 @ 22:25), Max: 37.3 (19 @ 17:05)  HR: 75 (19 @ 22:25) (66 - 92)  BP: 130/72 (19 @ 22:25) (95/53 - 144/54)  RR: 18 (19 @ 22:25) (16 - 20)  SpO2: 96% (19 @ 22:25) (94% - 100%)  Wt(kg): --      PHYSICAL EXAM:  GENERAL: NAD, well nourished and conversant  HEAD:  Atraumatic  EYES: EOM, PERRLA, conjunctiva pink and sclera white  ENT: No tonsillar erythema, exudates, or enlargement, moist mucous membranes, good dentition, no lesions  NECK: Supple, No JVD, normal thyroid, carotids with normal upstrokes and no bruits  CHEST/LUNG: Clear to auscultation bilaterally, No rales, rhonchi, wheezing, or rubs  HEART: Regular rate and rhythm, No murmurs, rubs, or gallops  ABDOMEN: Soft, nondistended, no masses, guarding, tenderness or rebound, bowel sounds present  EXTREMITIES:  2+ Peripheral Pulses, No clubbing, cyanosis, or edema.   SKIN: No rashes or lesions multiple tattoos   NERVOUS SYSTEM:  Alert & Oriented X3, normal cognitive function. Motor Strength 5/5 right upper and right lower.  5/5 left upper and left lower extremities, DTRs 2+ intact and symmetric  LABS:        CBC Full  -  ( 30 May 2019 13:21 )  WBC Count : 7.5 K/uL  RBC Count : 4.96 M/uL  Hemoglobin : 14.9 g/dL  Hematocrit : 44.4 %  Platelet Count - Automated : 176 K/uL  Mean Cell Volume : 89.5 fl  Mean Cell Hemoglobin : 30.1 pg  Mean Cell Hemoglobin Concentration : 33.7 gm/dL  Auto Neutrophil # : 6.6 K/uL  Auto Lymphocyte # : 0.5 K/uL  Auto Monocyte # : 0.4 K/uL  Auto Eosinophil # : 0.0 K/uL  Auto Basophil # : 0.0 K/uL  Auto Neutrophil % : 87.4 %  Auto Lymphocyte % : 7.0 %  Auto Monocyte % : 5.0 %  Auto Eosinophil % : 0.5 %  Auto Basophil % : 0.2 %        137  |  101  |  14  ----------------------------<  153<H>  3.9   |  23  |  1.16    Ca    8.4      30 May 2019 13:21    TPro  6.5  /  Alb  4.4  /  TBili  0.6  /  DBili  x   /  AST  42<H>  /  ALT  55<H>  /  AlkPhos  50  05-29    LIVER FUNCTIONS - ( 29 May 2019 20:30 )  Alb: 4.4 g/dL / Pro: 6.5 g/dL / ALK PHOS: 50 U/L / ALT: 55 U/L / AST: 42 U/L / GGT: x           PT/INR - ( 30 May 2019 04:28 )   PT: 10.8 sec;   INR: 0.95 ratio         PTT - ( 30 May 2019 04:28 )  PTT:22.7 sec  Urinalysis Basic - ( 30 May 2019 04:38 )    Color: Yellow / Appearance: Clear / S.009 / pH: x  Gluc: x / Ketone: Negative  / Bili: Negative / Urobili: Negative   Blood: x / Protein: Negative / Nitrite: Negative   Leuk Esterase: Negative / RBC: x / WBC x   Sq Epi: x / Non Sq Epi: x / Bacteria: x      CAPILLARY BLOOD GLUCOSE          RADIOLOGY & ADDITIONAL TESTS:
Post op Day [ 2]    Patient resting without complaints.  No chest pain, SOB, N/V.    T(C): 37 (06-01-19 @ 04:32), Max: 37 (05-31-19 @ 14:54)  HR: 84 (06-01-19 @ 04:32) (78 - 88)  BP: 122/69 (06-01-19 @ 04:32) (122/69 - 146/77)  RR: 18 (06-01-19 @ 04:32) (18 - 18)  SpO2: 94% (06-01-19 @ 04:32) (92% - 95%)  Wt(kg): --    Exam:  Alert and Oriented, No Acute Distress  LLE splint c/d/i in splint  Calves Soft, Non-tender bilaterally  digits pink, warm, mobile, SILT, brisk cap refill <2 seconds                          13.4   8.90  )-----------( 168      ( 31 May 2019 10:09 )             40.5    05-31    136  |  100  |  12  ----------------------------<  95  3.5   |  25  |  1.08    Ca    8.2<L>      31 May 2019 06:48
Pt S&E. Pain controlled. No acute events overnight.     Vital Signs Last 24 Hrs  T(C): 36.7 (31 May 2019 05:07), Max: 37.3 (30 May 2019 17:05)  T(F): 98 (31 May 2019 05:07), Max: 99.2 (30 May 2019 17:05)  HR: 77 (31 May 2019 05:07) (66 - 92)  BP: 116/68 (31 May 2019 05:07) (95/53 - 144/54)  BP(mean): 77 (30 May 2019 16:00) (77 - 92)  RR: 18 (31 May 2019 05:07) (16 - 18)  SpO2: 95% (31 May 2019 05:07) (92% - 100%)    Gen: NAD  LLE:  In trilam, Dsg c/d/i  SILT L2-S1, decreased 2/2 block  Can wiggle toes  Brisk cap refill  Soft compartments, - calf ttp
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 Denies HS/LOC. Denies pain/injury elsewhere. Denies numbness/tingling. Per ED, patient initially did not have pulses distally in LLE, conscious sedation and closed reduction was performed by ED physician, resulting in return of pulses. At time of interview, patient in LLE trilam splint, foot well perfused. Patient is a community ambulator at baseline. (29 May 2019 23:35)Pt tolerated procedure well. states pain is well controlled.     MEDICATIONS  (STANDING):  amphetamine/dextroamphetamine XR 20 milliGRAM(s) Oral with breakfast  aspirin enteric coated 325 milliGRAM(s) Oral two times a day  ceFAZolin   IVPB 2000 milliGRAM(s) IV Intermittent every 8 hours  docusate sodium 100 milliGRAM(s) Oral three times a day  escitalopram 10 milliGRAM(s) Oral daily  famotidine    Tablet 20 milliGRAM(s) Oral two times a day  hydrochlorothiazide 100 milliGRAM(s) Oral daily  lactated ringers. 1000 milliLiter(s) (125 mL/Hr) IV Continuous <Continuous>  losartan 100 milliGRAM(s) Oral daily  multivitamin 1 Tablet(s) Oral daily  senna 2 Tablet(s) Oral at bedtime    MEDICATIONS  (PRN):  HYDROmorphone   Tablet 2 milliGRAM(s) Oral every 3 hours PRN Moderate Pain (4 - 6)  HYDROmorphone   Tablet 4 milliGRAM(s) Oral every 3 hours PRN Severe Pain (7 - 10)  HYDROmorphone  Injectable 1 milliGRAM(s) IV Push every 4 hours PRN breakthrough  magnesium hydroxide Suspension 30 milliLiter(s) Oral daily PRN Constipation  ondansetron Injectable 4 milliGRAM(s) IV Push every 6 hours PRN Nausea and/or Vomiting  traMADol 50 milliGRAM(s) Oral every 8 hours PRN Mild Pain (1 - 3)        Vital Signs Last 24 Hrs  T(C): 36.7 (31 May 2019 05:07), Max: 37.3 (30 May 2019 17:05)  T(F): 98 (31 May 2019 05:07), Max: 99.2 (30 May 2019 17:05)  HR: 77 (31 May 2019 05:07) (66 - 92)  BP: 116/68 (31 May 2019 05:07) (95/53 - 144/54)  BP(mean): 77 (30 May 2019 16:00) (77 - 92)  RR: 18 (31 May 2019 05:07) (16 - 18)  SpO2: 95% (31 May 2019 05:07) (92% - 100%  -      PHYSICAL EXAM:  GENERAL: NAD, well nourished and conversant  HEAD:  Atraumatic  EYES: EOM, PERRLA, conjunctiva pink and sclera white  ENT: No tonsillar erythema, exudates, or enlargement, moist mucous membranes, good dentition, no lesions  NECK: Supple, No JVD, normal thyroid, carotids with normal upstrokes and no bruits  CHEST/LUNG: Clear to auscultation bilaterally, No rales, rhonchi, wheezing, or rubs  HEART: Regular rate and rhythm, No murmurs, rubs, or gallops  ABDOMEN: Soft, nondistended, no masses, guarding, tenderness or rebound, bowel sounds present  EXTREMITIES:  2+ Peripheral Pulses, No clubbing, cyanosis, or edema  s/p ORIF L ankle Fx POD1  SKIN: No rashes or lesions multiple tattoos   NERVOUS SYSTEM:  Alert & Oriented X3, normal cognitive function. Motor Strength 5/5 right upper and right lower.  5/5 left upper and left lower extremities, DTRs 2+ intact and symmetric  LABS:          CBC Full  -  ( 31 May 2019 10:09 )  WBC Count : 8.90 K/uL  RBC Count : 4.47 M/uL  Hemoglobin : 13.4 g/dL  Hematocrit : 40.5 %  Platelet Count - Automated : 168 K/uL  Mean Cell Volume : 90.6 fl  Mean Cell Hemoglobin : 30.0 pg  Mean Cell Hemoglobin Concentration : 33.1 gm/dL  Auto Neutrophil # : x  Auto Lymphocyte # : x  Auto Monocyte # : x  Auto Eosinophil # : x  Auto Basophil # : x  Auto Neutrophil % : x  Auto Lymphocyte % : x  Auto Monocyte % : x  Auto Eosinophil % : x  Auto Basophil % : x    05-31    136  |  100  |  12  ----------------------------<  95  3.5   |  25  |  1.08    Ca    8.2<L>      31 May 2019 06:48          CAPILLARY BLOOD GLUCOSE          RADIOLOGY & ADDITIONAL TESTS:

## 2019-06-07 ENCOUNTER — CLINICAL ADVICE (OUTPATIENT)
Age: 49
End: 2019-06-07

## 2019-06-07 ENCOUNTER — EMERGENCY (EMERGENCY)
Facility: HOSPITAL | Age: 49
LOS: 1 days | Discharge: HOME | End: 2019-06-07
Attending: EMERGENCY MEDICINE
Payer: COMMERCIAL

## 2019-06-07 ENCOUNTER — APPOINTMENT (OUTPATIENT)
Dept: FAMILY MEDICINE | Facility: CLINIC | Age: 49
End: 2019-06-07

## 2019-06-07 VITALS
WEIGHT: 197.98 LBS | SYSTOLIC BLOOD PRESSURE: 150 MMHG | OXYGEN SATURATION: 99 % | TEMPERATURE: 98 F | RESPIRATION RATE: 16 BRPM | DIASTOLIC BLOOD PRESSURE: 85 MMHG | HEART RATE: 87 BPM | HEIGHT: 67 IN

## 2019-06-07 VITALS
RESPIRATION RATE: 18 BRPM | DIASTOLIC BLOOD PRESSURE: 70 MMHG | SYSTOLIC BLOOD PRESSURE: 110 MMHG | TEMPERATURE: 98 F | HEART RATE: 70 BPM | OXYGEN SATURATION: 98 %

## 2019-06-07 DIAGNOSIS — Z96.7 PRESENCE OF OTHER BONE AND TENDON IMPLANTS: Chronic | ICD-10-CM

## 2019-06-07 LAB
ALBUMIN SERPL ELPH-MCNC: 4.1 G/DL — SIGNIFICANT CHANGE UP (ref 3.3–5)
ALP SERPL-CCNC: 67 U/L — SIGNIFICANT CHANGE UP (ref 40–120)
ALT FLD-CCNC: 35 U/L — SIGNIFICANT CHANGE UP (ref 10–45)
ANION GAP SERPL CALC-SCNC: 11 MMOL/L — SIGNIFICANT CHANGE UP (ref 5–17)
APTT BLD: 28.7 SEC — SIGNIFICANT CHANGE UP (ref 27.5–36.3)
AST SERPL-CCNC: 30 U/L — SIGNIFICANT CHANGE UP (ref 10–40)
BASOPHILS # BLD AUTO: 0 K/UL — SIGNIFICANT CHANGE UP (ref 0–0.2)
BASOPHILS NFR BLD AUTO: 0.7 % — SIGNIFICANT CHANGE UP (ref 0–2)
BILIRUB SERPL-MCNC: 0.5 MG/DL — SIGNIFICANT CHANGE UP (ref 0.2–1.2)
BUN SERPL-MCNC: 22 MG/DL — SIGNIFICANT CHANGE UP (ref 7–23)
CALCIUM SERPL-MCNC: 9.1 MG/DL — SIGNIFICANT CHANGE UP (ref 8.4–10.5)
CHLORIDE SERPL-SCNC: 100 MMOL/L — SIGNIFICANT CHANGE UP (ref 96–108)
CO2 SERPL-SCNC: 25 MMOL/L — SIGNIFICANT CHANGE UP (ref 22–31)
CREAT SERPL-MCNC: 1.18 MG/DL — SIGNIFICANT CHANGE UP (ref 0.5–1.3)
EOSINOPHIL # BLD AUTO: 0.2 K/UL — SIGNIFICANT CHANGE UP (ref 0–0.5)
EOSINOPHIL NFR BLD AUTO: 2.6 % — SIGNIFICANT CHANGE UP (ref 0–6)
GLUCOSE SERPL-MCNC: 102 MG/DL — HIGH (ref 70–99)
HCT VFR BLD CALC: 44.3 % — SIGNIFICANT CHANGE UP (ref 39–50)
HGB BLD-MCNC: 15.5 G/DL — SIGNIFICANT CHANGE UP (ref 13–17)
INR BLD: 0.96 RATIO — SIGNIFICANT CHANGE UP (ref 0.88–1.16)
LYMPHOCYTES # BLD AUTO: 2 K/UL — SIGNIFICANT CHANGE UP (ref 1–3.3)
LYMPHOCYTES # BLD AUTO: 32.3 % — SIGNIFICANT CHANGE UP (ref 13–44)
MCHC RBC-ENTMCNC: 31 PG — SIGNIFICANT CHANGE UP (ref 27–34)
MCHC RBC-ENTMCNC: 35.1 GM/DL — SIGNIFICANT CHANGE UP (ref 32–36)
MCV RBC AUTO: 88.3 FL — SIGNIFICANT CHANGE UP (ref 80–100)
MONOCYTES # BLD AUTO: 0.8 K/UL — SIGNIFICANT CHANGE UP (ref 0–0.9)
MONOCYTES NFR BLD AUTO: 13.1 % — SIGNIFICANT CHANGE UP (ref 2–14)
NEUTROPHILS # BLD AUTO: 3.2 K/UL — SIGNIFICANT CHANGE UP (ref 1.8–7.4)
NEUTROPHILS NFR BLD AUTO: 51.4 % — SIGNIFICANT CHANGE UP (ref 43–77)
PLATELET # BLD AUTO: 224 K/UL — SIGNIFICANT CHANGE UP (ref 150–400)
POTASSIUM SERPL-MCNC: 4.4 MMOL/L — SIGNIFICANT CHANGE UP (ref 3.5–5.3)
POTASSIUM SERPL-SCNC: 4.4 MMOL/L — SIGNIFICANT CHANGE UP (ref 3.5–5.3)
PROT SERPL-MCNC: 6.8 G/DL — SIGNIFICANT CHANGE UP (ref 6–8.3)
PROTHROM AB SERPL-ACNC: 10.9 SEC — SIGNIFICANT CHANGE UP (ref 10–12.9)
RBC # BLD: 5.01 M/UL — SIGNIFICANT CHANGE UP (ref 4.2–5.8)
RBC # FLD: 12.4 % — SIGNIFICANT CHANGE UP (ref 10.3–14.5)
SODIUM SERPL-SCNC: 136 MMOL/L — SIGNIFICANT CHANGE UP (ref 135–145)
TROPONIN T, HIGH SENSITIVITY RESULT: 7 NG/L — SIGNIFICANT CHANGE UP (ref 0–51)
TROPONIN T, HIGH SENSITIVITY RESULT: 9 NG/L — SIGNIFICANT CHANGE UP (ref 0–51)
WBC # BLD: 6.2 K/UL — SIGNIFICANT CHANGE UP (ref 3.8–10.5)
WBC # FLD AUTO: 6.2 K/UL — SIGNIFICANT CHANGE UP (ref 3.8–10.5)

## 2019-06-07 PROCEDURE — 96374 THER/PROPH/DIAG INJ IV PUSH: CPT | Mod: XU

## 2019-06-07 PROCEDURE — 93005 ELECTROCARDIOGRAM TRACING: CPT

## 2019-06-07 PROCEDURE — 93971 EXTREMITY STUDY: CPT | Mod: 26

## 2019-06-07 PROCEDURE — 93971 EXTREMITY STUDY: CPT

## 2019-06-07 PROCEDURE — 80053 COMPREHEN METABOLIC PANEL: CPT

## 2019-06-07 PROCEDURE — 85027 COMPLETE CBC AUTOMATED: CPT

## 2019-06-07 PROCEDURE — 99284 EMERGENCY DEPT VISIT MOD MDM: CPT | Mod: 25

## 2019-06-07 PROCEDURE — 73610 X-RAY EXAM OF ANKLE: CPT

## 2019-06-07 PROCEDURE — 85610 PROTHROMBIN TIME: CPT

## 2019-06-07 PROCEDURE — 99285 EMERGENCY DEPT VISIT HI MDM: CPT | Mod: 25

## 2019-06-07 PROCEDURE — 71275 CT ANGIOGRAPHY CHEST: CPT | Mod: 26

## 2019-06-07 PROCEDURE — 85730 THROMBOPLASTIN TIME PARTIAL: CPT

## 2019-06-07 PROCEDURE — 73610 X-RAY EXAM OF ANKLE: CPT | Mod: 26,LT

## 2019-06-07 PROCEDURE — 71275 CT ANGIOGRAPHY CHEST: CPT

## 2019-06-07 PROCEDURE — 96375 TX/PRO/DX INJ NEW DRUG ADDON: CPT | Mod: XU

## 2019-06-07 PROCEDURE — 93010 ELECTROCARDIOGRAM REPORT: CPT

## 2019-06-07 PROCEDURE — 84484 ASSAY OF TROPONIN QUANT: CPT

## 2019-06-07 RX ORDER — KETOROLAC TROMETHAMINE 30 MG/ML
30 SYRINGE (ML) INJECTION ONCE
Refills: 0 | Status: DISCONTINUED | OUTPATIENT
Start: 2019-06-07 | End: 2019-06-07

## 2019-06-07 RX ORDER — APIXABAN 2.5 MG/1
1 TABLET, FILM COATED ORAL
Qty: 14 | Refills: 0
Start: 2019-06-07 | End: 2019-06-13

## 2019-06-07 RX ORDER — HYDROMORPHONE HYDROCHLORIDE 2 MG/ML
0.5 INJECTION INTRAMUSCULAR; INTRAVENOUS; SUBCUTANEOUS ONCE
Refills: 0 | Status: DISCONTINUED | OUTPATIENT
Start: 2019-06-07 | End: 2019-06-07

## 2019-06-07 RX ORDER — APIXABAN 2.5 MG/1
10 TABLET, FILM COATED ORAL ONCE
Refills: 0 | Status: COMPLETED | OUTPATIENT
Start: 2019-06-07 | End: 2019-06-07

## 2019-06-07 RX ORDER — ONDANSETRON 8 MG/1
4 TABLET, FILM COATED ORAL ONCE
Refills: 0 | Status: COMPLETED | OUTPATIENT
Start: 2019-06-07 | End: 2019-06-07

## 2019-06-07 RX ADMIN — Medication 30 MILLIGRAM(S): at 10:09

## 2019-06-07 RX ADMIN — HYDROMORPHONE HYDROCHLORIDE 0.5 MILLIGRAM(S): 2 INJECTION INTRAMUSCULAR; INTRAVENOUS; SUBCUTANEOUS at 10:06

## 2019-06-07 RX ADMIN — APIXABAN 10 MILLIGRAM(S): 2.5 TABLET, FILM COATED ORAL at 14:04

## 2019-06-07 NOTE — ED PROVIDER NOTE - EXTREMITY EXAM
LLE- with posterior splint in place. Removed and anterior swelling and bruising noted at surgical site. Patient with calf tenderness noted

## 2019-06-07 NOTE — ED PROVIDER NOTE - OBJECTIVE STATEMENT
49 yo M with pmhx htn and sp ORIF of left ankle(Dr Shook) on May 30th presenting with worsening calf pain X last night. Patient states he had sudden onset of left calf pain that was 10/10 and woke him up. Patient states he stopped pain medication about 2 days ago and isnt taking any motrin or tylenol. Patient called his surgeon and told to come here. Patient admits to slight sob since yesterday. Patient denies tingling, numbness, weakness, fever, cough, cp, abd pain, nvd, back pain, dysuria and hematuria

## 2019-06-07 NOTE — ED ADULT NURSE NOTE - OBJECTIVE STATEMENT
48 year old male presents to ED through waiting room with fiance from home complaining of left calf pain and shortness of breath. History of HTN & ORIF Left ankle 5/30/2019 by Dr. Shook after slip and fall fracture. Has been taking PO Morphine (last at 4am) and PO Dilaudid (last at 2am) which has relieved his surgical pain however he is now having pain in his left calf which he states started this morning, associated with mild shortness of breath which is now resolved however was referred to come in by ortho for eval of r/o DVT. Soft cast was cut down by ED PA & Physician to evaluate extremity.  Has been ambulating with crutches. Denies headache, chest pain, palpitations, abdominal pain, cough, NVD, fevers, chills.

## 2019-06-07 NOTE — ED ADULT NURSE REASSESSMENT NOTE - NS ED NURSE REASSESS COMMENT FT1
Patients heart rate dropped to mid 30s - [facility] room called and spoke to Dr. Barnhart. Patient remains awake, alert, a&ox3. Dr. Barnhart let myself know and I went to  the printed out stripes which were handed off to Dr. Barnhart who is aware of patients rate & rhythm.

## 2019-06-07 NOTE — CONSULT NOTE ADULT - ASSESSMENT
48M sp L ankle ORIF on 5/30 now with LLE DVT    - new short leg trilam splint placed to LLE  - keep splint c/d/i  - NWB LLE  - crutches  - okay to discharge on therapeutic treatment for DVT such as eliquis  - follow up with PCP  - follow up with Dr. Shook at already scheduled appointment next week    Discussed with Dr. Shook

## 2019-06-07 NOTE — CONSULT NOTE ADULT - SUBJECTIVE AND OBJECTIVE BOX
Orthopaedic Surgery Consult Note    Chief Complaint: left calf pain    HPI:  48yMale who is sp left ankle ORIF on 5/30 by Dr. Shook sent home on  BID which patient states has been compliant with presented to ED with left calf pain. States that he has been compliant with nonweightbearing status. Denies f/c, n/v, numbness, tingling. No other complaints. No new trauma     ROS: As documented in HPI, otherwise negative.    PAST MEDICAL & SURGICAL HISTORY:  HTN (hypertension)  S/P ORIF (open reduction internal fixation) fracture: left ankle    [] No significant past history as reviewed with the patient and family    MEDICATIONS  (STANDING):  apixaban 10 milliGRAM(s) Oral once    MEDICATIONS  (PRN):    Allergies    No Known Allergies    Intolerances        Vital Signs Last 24 Hrs  T(C): 36.8 (07 Jun 2019 08:59), Max: 36.8 (07 Jun 2019 08:59)  T(F): 98.2 (07 Jun 2019 08:59), Max: 98.2 (07 Jun 2019 08:59)  HR: 72 (07 Jun 2019 10:00) (72 - 87)  BP: 126/72 (07 Jun 2019 10:00) (126/72 - 150/85)  BP(mean): --  RR: 18 (07 Jun 2019 10:00) (16 - 18)  SpO2: 98% (07 Jun 2019 10:00) (98% - 99%)      PHYSICAL EXAM:      Gen: NAD  LLE:  Incisions appropriately healing. c/d/i. anterior 1x1cm anterior superficial wound that appears to also be healing well.  + EHL/FHL/GSC/TA  sensation intact to DP/DP/sural/tibial/saphenous distributions  + DP pulse  WWP                          15.5   6.2   )-----------( 224      ( 07 Jun 2019 09:42 )             44.3     06-07    136  |  100  |  22  ----------------------------<  102<H>  4.4   |  25  |  1.18    Ca    9.1      07 Jun 2019 09:42    TPro  6.8  /  Alb  4.1  /  TBili  0.5  /  DBili  x   /  AST  30  /  ALT  35  /  AlkPhos  67  06-07    PT/INR - ( 07 Jun 2019 09:42 )   PT: 10.9 sec;   INR: 0.96 ratio         PTT - ( 07 Jun 2019 09:42 )  PTT:28.7 sec      IMAGING STUDIES:  Xray L ankle with hardware intact. No changes noted from postop    48y Male

## 2019-06-07 NOTE — ED PROVIDER NOTE - ATTENDING CONTRIBUTION TO CARE
47 yo M with pmhx htn and sp ORIF of left ankle(Dr Shook) on May 30th presenting with worsening calf pain X last night with regular post op changes seen, vss, also felt sob but lungs cta b/l.  high risk with recent ankle surgery, le us, cta, labs, ekg.

## 2019-06-07 NOTE — ED PROVIDER NOTE - CARE PROVIDER_API CALL
Shade Shook (MD)  Orthopaedic Surgery  [address], [address]  [place] [address]  Phone: [phone]  Fax: [phone]  Follow Up Time: 1-3 Days

## 2019-06-07 NOTE — ED PROVIDER NOTE - CLINICAL SUMMARY MEDICAL DECISION MAKING FREE TEXT BOX
47 yo M with pmhx htn and sp ORIF of left ankle(Dr Shook) on May 30th presenting with worsening calf pain X last night. Patient admits to slight sob. Due to recent surgery will obtain ekg, cardiac monitoring, labs, cta of the chest and US of leg. Will give analgesia

## 2019-06-07 NOTE — ED PROVIDER NOTE - PROGRESS NOTE DETAILS
discussed case with ortho. Patient cleared on eliquis. discussed starting the starter pack with patient then following up with his surgeon for rest of meds Ortho called back. Wants to not give him prophylactic medication. Will continue on asa. Ortho wants eliquis now.

## 2019-06-12 ENCOUNTER — APPOINTMENT (OUTPATIENT)
Dept: FAMILY MEDICINE | Facility: CLINIC | Age: 49
End: 2019-06-12
Payer: OTHER MISCELLANEOUS

## 2019-06-12 ENCOUNTER — APPOINTMENT (OUTPATIENT)
Dept: ORTHOPEDIC SURGERY | Facility: CLINIC | Age: 49
End: 2019-06-12
Payer: OTHER MISCELLANEOUS

## 2019-06-12 VITALS
SYSTOLIC BLOOD PRESSURE: 90 MMHG | BODY MASS INDEX: 31.08 KG/M2 | WEIGHT: 198 LBS | DIASTOLIC BLOOD PRESSURE: 62 MMHG | HEIGHT: 67 IN

## 2019-06-12 PROCEDURE — 99024 POSTOP FOLLOW-UP VISIT: CPT

## 2019-06-12 PROCEDURE — 99495 TRANSJ CARE MGMT MOD F2F 14D: CPT

## 2019-06-12 RX ORDER — RIFAMPIN 150 MG/1
150 CAPSULE ORAL WEEKLY
Qty: 78 | Refills: 0 | Status: DISCONTINUED | COMMUNITY
Start: 2019-04-25 | End: 2019-06-12

## 2019-06-12 NOTE — HISTORY OF PRESENT ILLNESS
[FreeTextEntry8] : Pt. is here for bwk, RX renew\par Diagnosed with fx leg and left sided soleal DVT at [facility] ORIF of left fibula Fx \par F/u admission to hospital Thursday 5/29/18 and discharged home on Fri 5/31/19

## 2019-06-12 NOTE — COUNSELING
[Weight management counseling provided] : Weight management [Healthy eating counseling provided] : healthy eating [Activity counseling provided] : activity [Fall prevention counseling provided] : fall prevention  [Behavioral health counseling provided] : behavioral health  [None] : None [Good understanding] : Patient has a good understanding of lifestyle changes and the steps needed to achieve self management goals

## 2019-06-12 NOTE — PLAN
[FreeTextEntry1] : Discussed at length \par Needs to stay on eliquis for total of 2-3 months\par Follow up US\par Recheck in 1 month\par Referral for Sleep study for apnea, hypersomnelence and bradycardia at sleep documented in hospital

## 2019-06-20 ENCOUNTER — APPOINTMENT (OUTPATIENT)
Dept: UROLOGY | Facility: CLINIC | Age: 49
End: 2019-06-20

## 2019-06-21 RX ORDER — ESCITALOPRAM OXALATE 10 MG/1
0 TABLET, FILM COATED ORAL
Qty: 0 | Refills: 0 | DISCHARGE

## 2019-06-21 RX ORDER — LOSARTAN/HYDROCHLOROTHIAZIDE 100MG-25MG
0 TABLET ORAL
Qty: 0 | Refills: 0 | DISCHARGE

## 2019-06-21 RX ORDER — ASPIRIN/CALCIUM CARB/MAGNESIUM 324 MG
0 TABLET ORAL
Qty: 0 | Refills: 0 | DISCHARGE

## 2019-06-21 RX ORDER — DEXTROAMPHETAMINE SACCHARATE, AMPHETAMINE ASPARTATE, DEXTROAMPHETAMINE SULFATE AND AMPHETAMINE SULFATE 1.875; 1.875; 1.875; 1.875 MG/1; MG/1; MG/1; MG/1
0 TABLET ORAL
Qty: 0 | Refills: 0 | DISCHARGE

## 2019-06-26 ENCOUNTER — APPOINTMENT (OUTPATIENT)
Dept: ORTHOPEDIC SURGERY | Facility: CLINIC | Age: 49
End: 2019-06-26
Payer: OTHER MISCELLANEOUS

## 2019-07-09 ENCOUNTER — APPOINTMENT (OUTPATIENT)
Dept: FAMILY MEDICINE | Facility: CLINIC | Age: 49
End: 2019-07-09

## 2019-07-09 PROBLEM — I10 ESSENTIAL (PRIMARY) HYPERTENSION: Chronic | Status: ACTIVE | Noted: 2019-05-29

## 2019-07-10 ENCOUNTER — APPOINTMENT (OUTPATIENT)
Dept: ORTHOPEDIC SURGERY | Facility: CLINIC | Age: 49
End: 2019-07-10
Payer: OTHER MISCELLANEOUS

## 2019-07-10 PROCEDURE — 99024 POSTOP FOLLOW-UP VISIT: CPT

## 2019-07-10 PROCEDURE — 73610 X-RAY EXAM OF ANKLE: CPT | Mod: LT

## 2019-07-13 NOTE — HISTORY OF PRESENT ILLNESS
[de-identified] : 49 yo M S/P ORIF Left Maisonneuve fracture  5/30/19. his post op course was complicated by Soleal DVT. He has been participating in therapy, and has been compliant with MWB restrictions. He complains of some ankle and knee pain.  [de-identified] : S/P ORIF Left Maisonneuve fracture  5/30/19  [de-identified] : 3 views of the LT ankle 2 views left Tibia taken today and reviewed by me show healing maisonneuve ankle fracture with plates and screws in good position tasia signs of mechanical failure. There is early callus proximally at the fibula fracture  [de-identified] : S/P ORIF Left Maisonneuve fracture  5/30/19 with Soleal DVT  [de-identified] : LEFT LE \par \par Mild swelling no ecchymosis \par Incision CDI fully helaed no erythema no drainage \par SILT TN SPN DPN SN  \par ankle ROM neutral dorsi to 15 degrees plantarflexion 10 degrees INV 5 degrees Eversion\par 4/5 strength TA PT GS EHL FHL peroneal \par NVID distally 2+ distal pulses [de-identified] : S/P ORIF Left Maisonneuve fracture  5/30/19 with Soleal DVT \par P: \par - Continue NWB and ROMAT \par - Continue in boot \par - He will continue to be out of work. He is 100% disabled and currently unable to work.

## 2019-07-15 ENCOUNTER — APPOINTMENT (OUTPATIENT)
Dept: FAMILY MEDICINE | Facility: CLINIC | Age: 49
End: 2019-07-15

## 2019-07-26 ENCOUNTER — APPOINTMENT (OUTPATIENT)
Dept: FAMILY MEDICINE | Facility: CLINIC | Age: 49
End: 2019-07-26
Payer: COMMERCIAL

## 2019-07-26 VITALS
WEIGHT: 198 LBS | HEIGHT: 67 IN | DIASTOLIC BLOOD PRESSURE: 78 MMHG | BODY MASS INDEX: 31.08 KG/M2 | SYSTOLIC BLOOD PRESSURE: 112 MMHG

## 2019-07-26 PROCEDURE — 99214 OFFICE O/P EST MOD 30 MIN: CPT

## 2019-07-26 NOTE — HISTORY OF PRESENT ILLNESS
[FreeTextEntry1] : RX renew and bwk. Lipids, tesotsterone, CBC, LFT's and f/u thyroid\par Doing well on ADD Meds. No side effects. No weight gain. No weight loss. No excessive jitteriness. No difficulties. Working on level of concentration\par

## 2019-07-26 NOTE — COUNSELING
[Weight management counseling provided] : Weight management [Healthy eating counseling provided] : healthy eating [Fall prevention counseling provided] : fall prevention  [Activity counseling provided] : activity [Behavioral health counseling provided] : behavioral health  [None] : None

## 2019-07-27 LAB
ALBUMIN SERPL ELPH-MCNC: 5 G/DL
ALP BLD-CCNC: 88 U/L
ALT SERPL-CCNC: 49 U/L
ANION GAP SERPL CALC-SCNC: 15 MMOL/L
AST SERPL-CCNC: 27 U/L
BASOPHILS # BLD AUTO: 0.04 K/UL
BASOPHILS NFR BLD AUTO: 0.8 %
BILIRUB SERPL-MCNC: 0.6 MG/DL
BUN SERPL-MCNC: 15 MG/DL
CALCIUM SERPL-MCNC: 9.8 MG/DL
CHLORIDE SERPL-SCNC: 98 MMOL/L
CO2 SERPL-SCNC: 26 MMOL/L
CREAT SERPL-MCNC: 1.18 MG/DL
EOSINOPHIL # BLD AUTO: 0.08 K/UL
EOSINOPHIL NFR BLD AUTO: 1.6 %
GLUCOSE SERPL-MCNC: 108 MG/DL
HCT VFR BLD CALC: 50.5 %
HGB BLD-MCNC: 16.5 G/DL
IMM GRANULOCYTES NFR BLD AUTO: 0.2 %
LYMPHOCYTES # BLD AUTO: 1.44 K/UL
LYMPHOCYTES NFR BLD AUTO: 28.5 %
MAN DIFF?: NORMAL
MCHC RBC-ENTMCNC: 29.1 PG
MCHC RBC-ENTMCNC: 32.7 GM/DL
MCV RBC AUTO: 89.1 FL
MONOCYTES # BLD AUTO: 0.5 K/UL
MONOCYTES NFR BLD AUTO: 9.9 %
NEUTROPHILS # BLD AUTO: 2.98 K/UL
NEUTROPHILS NFR BLD AUTO: 59 %
PLATELET # BLD AUTO: 258 K/UL
POTASSIUM SERPL-SCNC: 4.2 MMOL/L
PROT SERPL-MCNC: 7.4 G/DL
RBC # BLD: 5.67 M/UL
RBC # FLD: 14.3 %
SODIUM SERPL-SCNC: 138 MMOL/L
T3FREE SERPL-MCNC: 3.82 PG/ML
T4 FREE SERPL-MCNC: 1.5 NG/DL
TSH SERPL-ACNC: 1.31 UIU/ML
WBC # FLD AUTO: 5.05 K/UL

## 2019-07-29 ENCOUNTER — RX RENEWAL (OUTPATIENT)
Age: 49
End: 2019-07-29

## 2019-07-30 ENCOUNTER — TRANSCRIPTION ENCOUNTER (OUTPATIENT)
Age: 49
End: 2019-07-30

## 2019-07-30 LAB
CHOLEST SERPL-MCNC: 210 MG/DL
CHOLEST/HDLC SERPL: 5.1 RATIO
HDLC SERPL-MCNC: 41 MG/DL
LDLC SERPL CALC-MCNC: 145 MG/DL
TRIGL SERPL-MCNC: 122 MG/DL

## 2019-07-31 ENCOUNTER — RX RENEWAL (OUTPATIENT)
Age: 49
End: 2019-07-31

## 2019-07-31 ENCOUNTER — TRANSCRIPTION ENCOUNTER (OUTPATIENT)
Age: 49
End: 2019-07-31

## 2019-08-01 ENCOUNTER — APPOINTMENT (OUTPATIENT)
Dept: UROLOGY | Facility: CLINIC | Age: 49
End: 2019-08-01

## 2019-08-02 ENCOUNTER — RX RENEWAL (OUTPATIENT)
Age: 49
End: 2019-08-02

## 2019-08-02 ENCOUNTER — TRANSCRIPTION ENCOUNTER (OUTPATIENT)
Age: 49
End: 2019-08-02

## 2019-08-06 ENCOUNTER — FORM ENCOUNTER (OUTPATIENT)
Age: 49
End: 2019-08-06

## 2019-08-06 ENCOUNTER — TRANSCRIPTION ENCOUNTER (OUTPATIENT)
Age: 49
End: 2019-08-06

## 2019-08-06 LAB
TESTOST BND SERPL-MCNC: 5.2 PG/ML
TESTOST SERPL-MCNC: 221.5 NG/DL

## 2019-08-07 ENCOUNTER — APPOINTMENT (OUTPATIENT)
Dept: RADIOLOGY | Facility: CLINIC | Age: 49
End: 2019-08-07
Payer: COMMERCIAL

## 2019-08-07 ENCOUNTER — OUTPATIENT (OUTPATIENT)
Dept: OUTPATIENT SERVICES | Facility: HOSPITAL | Age: 49
LOS: 1 days | End: 2019-08-07
Payer: COMMERCIAL

## 2019-08-07 ENCOUNTER — APPOINTMENT (OUTPATIENT)
Dept: CT IMAGING | Facility: CLINIC | Age: 49
End: 2019-08-07
Payer: COMMERCIAL

## 2019-08-07 ENCOUNTER — APPOINTMENT (OUTPATIENT)
Dept: MRI IMAGING | Facility: CLINIC | Age: 49
End: 2019-08-07
Payer: COMMERCIAL

## 2019-08-07 DIAGNOSIS — Z96.7 PRESENCE OF OTHER BONE AND TENDON IMPLANTS: Chronic | ICD-10-CM

## 2019-08-07 DIAGNOSIS — M54.12 RADICULOPATHY, CERVICAL REGION: ICD-10-CM

## 2019-08-07 PROCEDURE — 74176 CT ABD & PELVIS W/O CONTRAST: CPT | Mod: 26

## 2019-08-07 PROCEDURE — 72141 MRI NECK SPINE W/O DYE: CPT | Mod: 26

## 2019-08-07 PROCEDURE — 74176 CT ABD & PELVIS W/O CONTRAST: CPT

## 2019-08-07 PROCEDURE — 74019 RADEX ABDOMEN 2 VIEWS: CPT | Mod: 26

## 2019-08-07 PROCEDURE — 74019 RADEX ABDOMEN 2 VIEWS: CPT

## 2019-08-07 PROCEDURE — 72141 MRI NECK SPINE W/O DYE: CPT

## 2019-08-10 ENCOUNTER — RX RENEWAL (OUTPATIENT)
Age: 49
End: 2019-08-10

## 2019-08-12 ENCOUNTER — APPOINTMENT (OUTPATIENT)
Dept: FAMILY MEDICINE | Facility: CLINIC | Age: 49
End: 2019-08-12

## 2019-08-13 ENCOUNTER — APPOINTMENT (OUTPATIENT)
Dept: UROLOGY | Facility: CLINIC | Age: 49
End: 2019-08-13
Payer: COMMERCIAL

## 2019-08-13 PROCEDURE — 99214 OFFICE O/P EST MOD 30 MIN: CPT

## 2019-08-13 NOTE — HISTORY OF PRESENT ILLNESS
[FreeTextEntry1] : 48y M with low testosterone here today to discuss blood test. Patient is currently injecting 100 to 150 mg of testosterone cypionate weekly. He reports no difficulties. He does remain concerned about his energy level is still low. The patient reports that his erections and libido is still good. However he works a lot at his 2 jobs and does not get good sleep.\par \par PMH: Patient is a 51-year-old gentleman who presents with the chief complaint of testosterone for evaluation. I reviewed the questionnaire he had completed with him in detail. From the age of [age] the patient had been abusing androgens. He then had 2 children with her partner who he subsequently . For the past year and a half he's been placed on testosterone and is taking 150 mg a week injectable testosterone cyprionate. He has no known drug allergies.  His past medical history demonstrates hypertension and hypercholesterolemia.  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  He does smoke and drinks only socially.  He has no known drug allergies.  His review of systems is non-contributory. His family history is not significant.\par

## 2019-08-13 NOTE — ASSESSMENT
[FreeTextEntry1] : This pleasant gentleman presents with a history of low testosterone on replacement therapy here today for follow up. he states he still feels tired. Recent testosterone was 225 ng/dl. Will repeat blood tests today. He took his last injection yesterday of 100 mg I will see him back in 3 months in followup.\par \par Also renewed his Sildenafil 25 mg. he is taking 2 to 3 of these as needed.\par \par Ref: [id number]

## 2019-08-14 ENCOUNTER — APPOINTMENT (OUTPATIENT)
Dept: ORTHOPEDIC SURGERY | Facility: CLINIC | Age: 49
End: 2019-08-14
Payer: COMMERCIAL

## 2019-08-14 LAB
BASOPHILS # BLD AUTO: 0.06 K/UL
BASOPHILS NFR BLD AUTO: 1.2 %
EOSINOPHIL # BLD AUTO: 0.21 K/UL
EOSINOPHIL NFR BLD AUTO: 4.1 %
ESTRADIOL SERPL-MCNC: 47 PG/ML
HCT VFR BLD CALC: 48.5 %
HGB BLD-MCNC: 15.6 G/DL
IMM GRANULOCYTES NFR BLD AUTO: 0.4 %
LH SERPL-ACNC: <0.3 IU/L
LYMPHOCYTES # BLD AUTO: 1.5 K/UL
LYMPHOCYTES NFR BLD AUTO: 29.2 %
MAN DIFF?: NORMAL
MCHC RBC-ENTMCNC: 29.7 PG
MCHC RBC-ENTMCNC: 32.2 GM/DL
MCV RBC AUTO: 92.2 FL
MONOCYTES # BLD AUTO: 0.45 K/UL
MONOCYTES NFR BLD AUTO: 8.8 %
NEUTROPHILS # BLD AUTO: 2.89 K/UL
NEUTROPHILS NFR BLD AUTO: 56.3 %
PLATELET # BLD AUTO: 257 K/UL
PSA SERPL-MCNC: 1.38 NG/ML
RBC # BLD: 5.26 M/UL
RBC # FLD: 14.2 %
WBC # FLD AUTO: 5.13 K/UL

## 2019-08-14 PROCEDURE — 99024 POSTOP FOLLOW-UP VISIT: CPT

## 2019-08-14 PROCEDURE — 73590 X-RAY EXAM OF LOWER LEG: CPT | Mod: LT

## 2019-08-14 PROCEDURE — 73610 X-RAY EXAM OF ANKLE: CPT | Mod: LT

## 2019-08-20 LAB
TESTOST BND SERPL-MCNC: 26.6 PG/ML
TESTOST SERPL-MCNC: 1032.9 NG/DL

## 2019-08-30 ENCOUNTER — APPOINTMENT (OUTPATIENT)
Dept: FAMILY MEDICINE | Facility: CLINIC | Age: 49
End: 2019-08-30
Payer: COMMERCIAL

## 2019-08-30 VITALS
SYSTOLIC BLOOD PRESSURE: 110 MMHG | HEIGHT: 67 IN | WEIGHT: 198 LBS | DIASTOLIC BLOOD PRESSURE: 80 MMHG | BODY MASS INDEX: 31.08 KG/M2

## 2019-08-30 PROCEDURE — 99213 OFFICE O/P EST LOW 20 MIN: CPT

## 2019-08-30 NOTE — HEALTH RISK ASSESSMENT
[] : No [No] : In the past 12 months have you used drugs other than those required for medical reasons? No [Any fall with injury in past year] : Patient reported fall with injury in the past year [0] : 2) Feeling down, depressed, or hopeless: Not at all (0) [[id number]] : 0

## 2019-09-05 ENCOUNTER — RX RENEWAL (OUTPATIENT)
Age: 49
End: 2019-09-05

## 2019-09-11 ENCOUNTER — APPOINTMENT (OUTPATIENT)
Dept: ORTHOPEDIC SURGERY | Facility: CLINIC | Age: 49
End: 2019-09-11
Payer: OTHER MISCELLANEOUS

## 2019-09-18 ENCOUNTER — APPOINTMENT (OUTPATIENT)
Dept: ORTHOPEDIC SURGERY | Facility: CLINIC | Age: 49
End: 2019-09-18
Payer: OTHER MISCELLANEOUS

## 2019-09-18 PROCEDURE — 73610 X-RAY EXAM OF ANKLE: CPT | Mod: LT

## 2019-09-18 PROCEDURE — 99213 OFFICE O/P EST LOW 20 MIN: CPT

## 2019-09-18 PROCEDURE — 73590 X-RAY EXAM OF LOWER LEG: CPT | Mod: LT

## 2019-09-24 ENCOUNTER — TRANSCRIPTION ENCOUNTER (OUTPATIENT)
Age: 49
End: 2019-09-24

## 2019-10-01 ENCOUNTER — RX RENEWAL (OUTPATIENT)
Age: 49
End: 2019-10-01

## 2019-10-04 ENCOUNTER — APPOINTMENT (OUTPATIENT)
Dept: CARDIOLOGY | Facility: CLINIC | Age: 49
End: 2019-10-04

## 2019-10-23 ENCOUNTER — APPOINTMENT (OUTPATIENT)
Dept: ORTHOPEDIC SURGERY | Facility: CLINIC | Age: 49
End: 2019-10-23
Payer: OTHER MISCELLANEOUS

## 2019-10-23 PROCEDURE — 73610 X-RAY EXAM OF ANKLE: CPT | Mod: LT

## 2019-10-23 PROCEDURE — 99213 OFFICE O/P EST LOW 20 MIN: CPT

## 2019-10-25 ENCOUNTER — CLINICAL ADVICE (OUTPATIENT)
Age: 49
End: 2019-10-25

## 2019-11-05 ENCOUNTER — APPOINTMENT (OUTPATIENT)
Dept: FAMILY MEDICINE | Facility: CLINIC | Age: 49
End: 2019-11-05
Payer: COMMERCIAL

## 2019-11-05 ENCOUNTER — NON-APPOINTMENT (OUTPATIENT)
Age: 49
End: 2019-11-05

## 2019-11-05 VITALS
BODY MASS INDEX: 33.43 KG/M2 | DIASTOLIC BLOOD PRESSURE: 86 MMHG | WEIGHT: 213 LBS | HEIGHT: 67 IN | SYSTOLIC BLOOD PRESSURE: 120 MMHG

## 2019-11-05 PROCEDURE — 81002 URINALYSIS NONAUTO W/O SCOPE: CPT

## 2019-11-05 PROCEDURE — 36415 COLL VENOUS BLD VENIPUNCTURE: CPT

## 2019-11-05 PROCEDURE — 99396 PREV VISIT EST AGE 40-64: CPT | Mod: 25

## 2019-11-05 PROCEDURE — 99173 VISUAL ACUITY SCREEN: CPT

## 2019-11-05 PROCEDURE — 93000 ELECTROCARDIOGRAM COMPLETE: CPT

## 2019-11-05 PROCEDURE — 92551 PURE TONE HEARING TEST AIR: CPT

## 2019-11-05 NOTE — PHYSICAL EXAM

## 2019-11-05 NOTE — COUNSELING
[Fall prevention counseling provided] : Fall prevention counseling provided [Behavioral health counseling provided] : Behavioral health counseling provided [Potential consequences of obesity discussed] : Potential consequences of obesity discussed [Benefits of weight loss discussed] : Benefits of weight loss discussed [Encouraged to maintain food diary] : Encouraged to maintain food diary [Encouraged to increase physical activity] : Encouraged to increase physical activity [Encouraged to use exercise tracking device] : Encouraged to use exercise tracking device [None] : None [Good understanding] : Patient has a good understanding of lifestyle changes and steps needed to achieve self management goal

## 2019-11-05 NOTE — HEALTH RISK ASSESSMENT
[Very Good] : ~his/her~  mood as very good [] : No [No] : In the past 12 months have you used drugs other than those required for medical reasons? No [0] : 2) Feeling down, depressed, or hopeless: Not at all (0) [[id number]] : 0 [Patient reported colonoscopy was abnormal] : Patient reported colonoscopy was abnormal [HIV test declined] : HIV test declined [Hepatitis C test declined] : Hepatitis C test declined [Change in mental status noted] : No change in mental status noted [Language] : denies difficulty with language [Behavior] : denies difficulty with behavior [Handling Complex Tasks] : denies difficulty handling complex tasks [Reasoning] : denies difficulty with reasoning [None] : None [With Significant Other] : lives with significant other [Employed] : employed [College] : College [Single] : single [Significant Other] : lives with significant other [Sexually Active] : sexually active [High Risk Behavior] : no high risk behavior [Feels Safe at Home] : Feels safe at home [Fully functional (bathing, dressing, toileting, transferring, walking, feeding)] : Fully functional (bathing, dressing, toileting, transferring, walking, feeding) [Fully functional (using the telephone, shopping, preparing meals, housekeeping, doing laundry, using] : Fully functional and needs no help or supervision to perform IADLs (using the telephone, shopping, preparing meals, housekeeping, doing laundry, using transportation, managing medications and managing finances) [Reports changes in hearing] : Reports no changes in hearing [Reports changes in vision] : Reports no changes in vision [Reports changes in dental health] : Reports no changes in dental health [Smoke Detector] : smoke detector [Carbon Monoxide Detector] : carbon monoxide detector [Guns at Home] : no guns at home [Seat Belt] :  uses seat belt [Sunscreen] : uses sunscreen [ColonoscopyDate] : 10/18 [ColonoscopyComments] : polyps

## 2019-11-07 LAB
CHOLEST SERPL-MCNC: 138 MG/DL
CHOLEST/HDLC SERPL: 5.5 RATIO
ESTIMATED AVERAGE GLUCOSE: 105 MG/DL
HBA1C MFR BLD HPLC: 5.3 %
HDLC SERPL-MCNC: 25 MG/DL
LDLC SERPL CALC-MCNC: 87 MG/DL
T3FREE SERPL-MCNC: 3.44 PG/ML
T4 FREE SERPL-MCNC: 1.6 NG/DL
TRIGL SERPL-MCNC: 129 MG/DL
TSH SERPL-ACNC: 2.4 UIU/ML

## 2019-11-10 ENCOUNTER — TRANSCRIPTION ENCOUNTER (OUTPATIENT)
Age: 49
End: 2019-11-10

## 2019-11-15 ENCOUNTER — APPOINTMENT (OUTPATIENT)
Dept: CARDIOLOGY | Facility: CLINIC | Age: 49
End: 2019-11-15
Payer: SELF-PAY

## 2019-11-15 ENCOUNTER — NON-APPOINTMENT (OUTPATIENT)
Age: 49
End: 2019-11-15

## 2019-11-15 VITALS
BODY MASS INDEX: 32.65 KG/M2 | SYSTOLIC BLOOD PRESSURE: 130 MMHG | HEART RATE: 87 BPM | DIASTOLIC BLOOD PRESSURE: 78 MMHG | OXYGEN SATURATION: 98 % | WEIGHT: 208 LBS | RESPIRATION RATE: 16 BRPM | HEIGHT: 67 IN

## 2019-11-15 PROCEDURE — 99214 OFFICE O/P EST MOD 30 MIN: CPT

## 2019-11-15 PROCEDURE — 93000 ELECTROCARDIOGRAM COMPLETE: CPT

## 2019-11-15 RX ORDER — OMEPRAZOLE 20 MG/1
20 CAPSULE, DELAYED RELEASE ORAL
Qty: 90 | Refills: 1 | Status: DISCONTINUED | COMMUNITY
Start: 2018-06-25 | End: 2019-11-15

## 2019-11-15 RX ORDER — ZOLPIDEM TARTRATE 10 MG/1
10 TABLET ORAL
Qty: 30 | Refills: 2 | Status: DISCONTINUED | COMMUNITY
Start: 2019-03-07 | End: 2019-11-15

## 2019-11-15 RX ORDER — APIXABAN 5 MG/1
5 TABLET, FILM COATED ORAL
Qty: 60 | Refills: 2 | Status: DISCONTINUED | COMMUNITY
Start: 2019-06-12 | End: 2019-11-15

## 2019-11-15 RX ORDER — ASPIRIN 81 MG
81 TABLET, DELAYED RELEASE (ENTERIC COATED) ORAL
Refills: 0 | Status: ACTIVE | COMMUNITY

## 2019-11-15 RX ORDER — TRAMADOL HYDROCHLORIDE 50 MG/1
50 TABLET, COATED ORAL 4 TIMES DAILY
Qty: 60 | Refills: 0 | Status: DISCONTINUED | COMMUNITY
Start: 2019-06-12 | End: 2019-11-15

## 2019-11-15 RX ORDER — CLONAZEPAM 0.5 MG/1
0.5 TABLET ORAL
Qty: 60 | Refills: 0 | Status: DISCONTINUED | COMMUNITY
Start: 2019-03-07 | End: 2019-11-15

## 2019-11-15 RX ORDER — IBUPROFEN 600 MG/1
600 TABLET, FILM COATED ORAL 3 TIMES DAILY
Qty: 30 | Refills: 0 | Status: DISCONTINUED | COMMUNITY
Start: 2018-04-27 | End: 2019-11-15

## 2019-11-15 RX ORDER — CHROMIUM 200 MCG
1000 TABLET ORAL DAILY
Qty: 90 | Refills: 1 | Status: DISCONTINUED | COMMUNITY
Start: 2018-05-21 | End: 2019-11-15

## 2019-11-15 RX ORDER — LOSARTAN POTASSIUM 100 MG/1
100 TABLET, FILM COATED ORAL DAILY
Qty: 90 | Refills: 0 | Status: DISCONTINUED | COMMUNITY
Start: 2019-11-05 | End: 2019-11-15

## 2019-11-15 RX ORDER — DICLOFENAC SODIUM 75 MG/1
75 TABLET, DELAYED RELEASE ORAL
Qty: 30 | Refills: 1 | Status: DISCONTINUED | COMMUNITY
Start: 2019-08-10 | End: 2019-11-15

## 2019-11-15 RX ORDER — SILDENAFIL 25 MG/1
25 TABLET ORAL
Qty: 60 | Refills: 2 | Status: DISCONTINUED | COMMUNITY
Start: 2018-04-13 | End: 2019-11-15

## 2019-11-15 RX ORDER — ASPIRIN 325 MG/1
TABLET, FILM COATED ORAL DAILY
Refills: 0 | Status: DISCONTINUED | COMMUNITY
End: 2019-11-15

## 2019-11-15 RX ORDER — PYRIDOXINE HCL (VITAMIN B6) 50 MG
50 TABLET ORAL DAILY
Qty: 100 | Refills: 0 | Status: DISCONTINUED | COMMUNITY
Start: 2019-04-25 | End: 2019-11-15

## 2019-11-15 RX ORDER — SILDENAFIL 20 MG/1
20 TABLET ORAL
Qty: 60 | Refills: 3 | Status: DISCONTINUED | COMMUNITY
Start: 2018-08-21 | End: 2019-11-15

## 2019-11-15 RX ORDER — ALFUZOSIN HYDROCHLORIDE 10 MG/1
10 TABLET, EXTENDED RELEASE ORAL DAILY
Qty: 30 | Refills: 5 | Status: DISCONTINUED | COMMUNITY
Start: 2018-08-15 | End: 2019-11-15

## 2019-11-15 NOTE — REASON FOR VISIT
[FreeTextEntry1] : This is a 49 year old patient returning here for concerns re CP and an abnormal ECG taken at his PCPs office and told there was a possible old infarct seen.\par \par His concerns include:\par 1. ECG abnormalities.\par 2. A strong family history for premature CAD\par 3. History of hyperlipidemia\par 4. A prior history of hypertension\par 5. Prior history of obesity.\par \par

## 2019-11-15 NOTE — HISTORY OF PRESENT ILLNESS
[FreeTextEntry1] : \par Experiencing chest pain which he attributes to increased stress.  States the pain is left precordial , focal, non-radiating, not dull or sharp, it is non exertional with no SOB.\par \par Denies palpitations or edema. \par \par Continues to work out 5 days a week using both resistance and aerobic exercise without difficulty or limitation.\par No CP during his workouts\par \par Recently finished AC therapy / Eliquis for a DVT after breaking his ankle.\par \par Had a calcium scoring done about 6 years ago. Those results were not available.\par \par Denies any SOB, edema, orthopnea or palps.\par

## 2019-11-15 NOTE — ASSESSMENT
[FreeTextEntry1] : ECG- SR at 87 bpm, LAE, PRWP, leftward axis deviation, non significant ST wave changes.\par .\par Laboratory data 11/5/19:\par Creatinine 1.45\par \par AST 51\par Electrolytes normal.\par \par Cholesterol 138\par HDL              25\par LDL              87\par Triglycerides 129\par A1c 5.3\par \par Blood testing done 7/27/18: \par Cholesterol 110.\par HDL 30. \par LDL 58. \par Electrolytes normal but creatinine 1.36 (on losartan HCT).\par ALT 54.  \par Hemoglobin 16.5.  \par \par Echocardiogram 7/18/18:  Mild concentric hypertrophy with overall normal left ventricular systolic function.  LVEF 65%.  Focal aortic sclerosis.  Otherwise unremarkable echocardiogram. \par \par Carotid Doppler 7/18/18 showed no intimal thickening or plaquing.  Study is within normal limits.  \par \par Exercise stress testing 7/2/18:  Patient exercised on a standard Delta protocol for a total of 12 minutes and 15 seconds with a peak heart rate of 155 (90% maximally predicted).  Normal blood pressure response.  No significant symptoms.  Rare apices.  No ischemic ECG changes.   \par \par The exercise stress test shows excellent exercise tolerance without any exercise induced ischemia, arrhythmia, or hypertension.  \par \par Impression:  \par 1.  He has no evidence of atherosclerotic disease by carotid study nor any increased intimal thickening. \par 2.  Focal chest pain which does not worsen with exertion and not associated with any other symptoms\par 3.  His exercise tolerance is excellent. \par 4.  He does have mild concentric hypertrophy.  \par 5.  The patient has a history of hypertension it seems to be pretty well-controlled with Losartan 100mg  and  HCT          25mg . Today 130/78.\par 6.A chest pain syndrome is focal, nonexertional, not associated with any symptoms and seems quite atypical in nature. He exercises avidly and intensely without any exertional symptoms.The stress test of 7/2/18 that showed very good functional capacity and absent many exercise-induced ischemia.\par 6. ECG today stable  with no ischemic changes.\par 7. There is a very strong Fam Hx including  Father - CABG at 46 y/o and Brother stents x 3 at 42 y/o. Previous           cardiac testing negative for any evidence of Coronary disease.\par 8. 2018 lipid show good control.\par \par \par Plan\par 1. This chest pain which he describes does not seem cardiac related but more muscular skeletal. There have been  similar complaints which have been  discussed and pursued with noninvasive cardiac testing in 2018 which was negative for any evidence of ischemia.\par \par At this time no further cardiac testing is needed\par \par 2. Continue to exercise as tolerated\par 3. Continue to F/U with PCP as scheduled and have all blood work faxed to our office.\par 4. No changes to be made to his pharmacological management.\par \par \par Clinical follow up 6 months \par  \par

## 2019-11-17 ENCOUNTER — TRANSCRIPTION ENCOUNTER (OUTPATIENT)
Age: 49
End: 2019-11-17

## 2019-11-17 LAB
ALBUMIN SERPL ELPH-MCNC: 5.2 G/DL
ALP BLD-CCNC: 61 U/L
ALT SERPL-CCNC: 118 U/L
ANION GAP SERPL CALC-SCNC: 17 MMOL/L
AST SERPL-CCNC: 51 U/L
BASOPHILS # BLD AUTO: 0.05 K/UL
BASOPHILS NFR BLD AUTO: 0.8 %
BILIRUB SERPL-MCNC: 0.4 MG/DL
BUN SERPL-MCNC: 19 MG/DL
CALCIUM SERPL-MCNC: 10.1 MG/DL
CHLORIDE SERPL-SCNC: 97 MMOL/L
CO2 SERPL-SCNC: 24 MMOL/L
CREAT SERPL-MCNC: 1.45 MG/DL
EOSINOPHIL # BLD AUTO: 0.07 K/UL
EOSINOPHIL NFR BLD AUTO: 1.1 %
GLUCOSE SERPL-MCNC: 90 MG/DL
HCT VFR BLD CALC: 56.8 %
HGB BLD-MCNC: 18.3 G/DL
IMM GRANULOCYTES NFR BLD AUTO: 0.2 %
LYMPHOCYTES # BLD AUTO: 1.5 K/UL
LYMPHOCYTES NFR BLD AUTO: 24.4 %
MAN DIFF?: NORMAL
MCHC RBC-ENTMCNC: 29.5 PG
MCHC RBC-ENTMCNC: 32.2 GM/DL
MCV RBC AUTO: 91.5 FL
MONOCYTES # BLD AUTO: 0.64 K/UL
MONOCYTES NFR BLD AUTO: 10.4 %
NEUTROPHILS # BLD AUTO: 3.89 K/UL
NEUTROPHILS NFR BLD AUTO: 63.1 %
PLATELET # BLD AUTO: 303 K/UL
POTASSIUM SERPL-SCNC: 4.1 MMOL/L
PROT SERPL-MCNC: 7.6 G/DL
RBC # BLD: 6.21 M/UL
RBC # FLD: 12.9 %
SODIUM SERPL-SCNC: 137 MMOL/L
WBC # FLD AUTO: 6.16 K/UL

## 2019-11-19 ENCOUNTER — APPOINTMENT (OUTPATIENT)
Dept: UROLOGY | Facility: CLINIC | Age: 49
End: 2019-11-19

## 2019-11-23 ENCOUNTER — TRANSCRIPTION ENCOUNTER (OUTPATIENT)
Age: 49
End: 2019-11-23

## 2019-11-26 ENCOUNTER — TRANSCRIPTION ENCOUNTER (OUTPATIENT)
Age: 49
End: 2019-11-26

## 2019-12-03 ENCOUNTER — APPOINTMENT (OUTPATIENT)
Dept: FAMILY MEDICINE | Facility: CLINIC | Age: 49
End: 2019-12-03

## 2019-12-10 ENCOUNTER — APPOINTMENT (OUTPATIENT)
Dept: ORTHOPEDIC SURGERY | Facility: CLINIC | Age: 49
End: 2019-12-10
Payer: OTHER MISCELLANEOUS

## 2019-12-10 PROCEDURE — 99213 OFFICE O/P EST LOW 20 MIN: CPT

## 2019-12-10 PROCEDURE — 73610 X-RAY EXAM OF ANKLE: CPT | Mod: LT

## 2019-12-10 NOTE — RETURN TO WORK/SCHOOL
[Return Date: _____] : as of [unfilled].  This has been discussed in detail with ~Petra~ and ~he/she~ understands this. [Full Duty] : full duty

## 2019-12-12 NOTE — HISTORY OF PRESENT ILLNESS
[Chills] : no chills [Fever] : no fever [Vomiting] : no vomiting [Nausea] : no nausea [Doing Well] : is doing well [Erythema] : not erythematous [Clean/Dry/Intact] : clean, dry and intact [Excellent Pain Control] : has excellent pain control [No Sign of Infection] : is showing no signs of infection [None] : None [de-identified] : 3 views of the LT ankle 2 views left Tibia taken today and reviewed by me show healed maisonneuve ankle fracture with plates and screws in good position no signs of mechanical failure. There is  callus proximally at the fibula fracture  [de-identified] : 49 yo M S/P ORIF Left Maisonneuve fracture  5/30/19. his post op course was complicated by Soleal DVT. He has been participating in therapy, and has been WBAT He has been doing well and returns for routine post op followup. He states he has returned to normal function [de-identified] : LEFT LE \par  no swelling no ecchymosis \par Incision CDI fully helaed no erythema no drainage \par SILT TN SPN DPN SN  \par ankle ROM 15 degrees dorsi to 20 degrees plantarflexion 10 degrees INV 5 degrees Eversion\par 5-/5 strength TA PT GS EHL FHL peroneal \par NVID distally 2+ distal pulses [de-identified] : S/P ORIF Left Maisonneuve fracture  5/30/19  [de-identified] : S/P ORIF Left Maisonneuve fracture  5/30/19 with Soleal DVT \par P: \par -  Continue PT WBAT ROM and strengthening gait and abulation training \par - Pt is ready to return to work.  Letter given\par - Pt encouraged to continue w his HEP\par - F/U PRN [de-identified] : S/P ORIF Left Maisonneuve fracture  5/30/19

## 2019-12-12 NOTE — REASON FOR VISIT
[Follow-Up Visit] : a follow-up visit for [FreeTextEntry2] : S/P ORIF Left Maisonneuve fracture 5/30/19

## 2019-12-12 NOTE — PHYSICAL EXAM
[UE/LE] : Sensory: Intact in bilateral upper & lower extremities [Antalgic] : antalgic [ALL] : Biceps, brachioradialis, triceps, patellar, ankle and plantar 2+ and symmetric bilaterally

## 2019-12-23 ENCOUNTER — APPOINTMENT (OUTPATIENT)
Dept: NEPHROLOGY | Facility: CLINIC | Age: 49
End: 2019-12-23

## 2019-12-23 ENCOUNTER — APPOINTMENT (OUTPATIENT)
Dept: GASTROENTEROLOGY | Facility: CLINIC | Age: 49
End: 2019-12-23

## 2020-01-02 ENCOUNTER — APPOINTMENT (OUTPATIENT)
Dept: FAMILY MEDICINE | Facility: CLINIC | Age: 50
End: 2020-01-02
Payer: SELF-PAY

## 2020-01-02 PROCEDURE — PCNS1: CPT

## 2020-01-22 ENCOUNTER — APPOINTMENT (OUTPATIENT)
Dept: BARIATRICS | Facility: CLINIC | Age: 50
End: 2020-01-22

## 2020-01-27 ENCOUNTER — EMERGENCY (EMERGENCY)
Facility: HOSPITAL | Age: 50
LOS: 1 days | Discharge: HOME | End: 2020-01-27
Attending: EMERGENCY MEDICINE
Payer: COMMERCIAL

## 2020-01-27 VITALS
WEIGHT: 210.1 LBS | OXYGEN SATURATION: 98 % | HEIGHT: 67 IN | HEART RATE: 92 BPM | DIASTOLIC BLOOD PRESSURE: 92 MMHG | SYSTOLIC BLOOD PRESSURE: 136 MMHG | RESPIRATION RATE: 18 BRPM

## 2020-01-27 DIAGNOSIS — Z96.7 PRESENCE OF OTHER BONE AND TENDON IMPLANTS: Chronic | ICD-10-CM

## 2020-01-27 PROCEDURE — 73590 X-RAY EXAM OF LOWER LEG: CPT | Mod: 26,LT

## 2020-01-27 PROCEDURE — 99284 EMERGENCY DEPT VISIT MOD MDM: CPT

## 2020-01-27 PROCEDURE — 73590 X-RAY EXAM OF LOWER LEG: CPT

## 2020-01-27 PROCEDURE — 73610 X-RAY EXAM OF ANKLE: CPT

## 2020-01-27 PROCEDURE — 99283 EMERGENCY DEPT VISIT LOW MDM: CPT

## 2020-01-27 PROCEDURE — 73610 X-RAY EXAM OF ANKLE: CPT | Mod: 26,LT

## 2020-01-27 RX ORDER — IBUPROFEN 200 MG
600 TABLET ORAL ONCE
Refills: 0 | Status: COMPLETED | OUTPATIENT
Start: 2020-01-27 | End: 2020-01-27

## 2020-01-27 RX ORDER — IBUPROFEN 200 MG
1 TABLET ORAL
Qty: 28 | Refills: 0
Start: 2020-01-27 | End: 2020-02-02

## 2020-01-27 RX ADMIN — Medication 600 MILLIGRAM(S): at 17:19

## 2020-01-27 NOTE — ED PROVIDER NOTE - OBJECTIVE STATEMENT
50 yo M with pmhx htn and sp ORIF of left ankle(Dr Shook) on May 30th presenting with acute worsening of left ankle pain without trauma yesterday while at work. no redness/swelling. worse with weight bearing.

## 2020-01-27 NOTE — ED PROVIDER NOTE - PMH
Spoke with father and offered an appt on 2/12/19 at 1:00 pm in [place] with Dr. Reed, father accepted and appt was scheduled.    No further intervention is needed at this time.    HTN (hypertension)    HTN (hypertension)    Hypercholesteremia    Hypothyroidism    Unilateral inguinal hernia without obstruction or gangrene, recurrence not specified

## 2020-01-27 NOTE — ED PROVIDER NOTE - PATIENT PORTAL LINK FT
You can access the FollowMyHealth Patient Portal offered by [facility] by registering at the following website: [email or web address]. By joining [employer]’s FollowMyHealth portal, you will also be able to view your health information using other applications (apps) compatible with our system.

## 2020-01-27 NOTE — ED ADULT NURSE NOTE - PSH
No significant past surgical history    S/P ORIF (open reduction internal fixation) fracture  left ankle

## 2020-01-27 NOTE — ED PROVIDER NOTE - CARE PROVIDER_API CALL
Shade Shook (MD)  Orthopaedic Surgery  [address]  [place] [address]  Phone: [phone]  Fax: [phone]  Follow Up Time:

## 2020-01-27 NOTE — ED PROVIDER NOTE - MUSCULOSKELETAL, MLM
Spine appears normal, ambulating, left ankle with well healed incision over lateral mall, ttp over the proximal portion

## 2020-01-27 NOTE — ED ADULT NURSE NOTE - PMH
HTN (hypertension)    HTN (hypertension)    Hypercholesteremia    Hypothyroidism    Unilateral inguinal hernia without obstruction or gangrene, recurrence not specified

## 2020-01-27 NOTE — ED PROVIDER NOTE - PROGRESS NOTE DETAILS
ortho looked at imaging, can see Dr. Shook in office for outpatient follow up Attending MD Del Cid: Imaging reviewed, XR prelim read by jeannette, stable for discharge.  Will follow up with Dr. stevenson for outpatient follow up. Follow up instructions given, importance of follow up emphasized, return to ED parameters reviewed and patient verbalized understanding.  All questions answered, all concerns addressed.

## 2020-01-27 NOTE — ED ADULT NURSE NOTE - OBJECTIVE STATEMENT
C/o left ankle pain. Previously had sx May 30, 2019 with plates and screws placed. States when he walks has pressure on his left ankle/ foot. Denies chest pain, sob, ha, n/v/d, abdominal pain, f/c, urinary symptoms, hematuria. A&Ox4, vss, skin warm dry and intact, MAEx4, lungs CTA, abd soft nondistended. Pt resting comfortably with VSS, no complaints at this time. Patient's bed in the lowest position, explained plan of care to patient and family members. Will continue to reassess.

## 2020-01-27 NOTE — ED PROVIDER NOTE - NSFOLLOWUPINSTRUCTIONS_ED_ALL_ED_FT
Take Motrin 600 mg every 6 hrs as needed for pain. Take with food   Take Tylenol 650mg every 6 hrs as needed for pain.  Take Tylenol 650mg every 6 hrs as needed for pain.   Follow up with your orthopedist     Follow up with your Primary Care Physician within the next 2-3 days  Bring a copy of your test results with you to your appointment  Continue your current medication regimen  Return to the Emergency Room if you experience new or worsening symptoms

## 2020-01-27 NOTE — ED PROVIDER NOTE - ATTENDING CONTRIBUTION TO CARE
Attending MD Del Cid:   I personally have seen and examined this patient.  Physician assistant note reviewed and agree on plan of care and except where noted.  See below for details.     Seen in MW    49M with PMH/PSH including s/p ORIF of bimall fracture of L ankle on 5/30/19     TO BE COMPLETED Attending MD Del Cid:   I personally have seen and examined this patient.  Physician assistant note reviewed and agree on plan of care and except where noted.  See below for details.     Seen in MW    49M with PMH/PSH including s/p ORIF of bimall fracture of L ankle on 5/30/19 presents to the ED with L ankle pain.  Reports has been ambulating, reports has been worsening since yesterday.  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  Reports worse with standing/ambulating/weight bearing.  Denies new trauma, fall.  Denies redness, swelling.  Denies fevers, chills.  On exam, NAD, head NCAT, unlabored breathing, moving all extremities, +mild tenderness to proximal aspect of L lat mall with well healed incision, no edema, no erythema, no fluctuance, no palpable hardware; A/P: 49M with L ankle pain, will obtain XRs, pain control, reassess

## 2020-01-28 NOTE — ED POST DISCHARGE NOTE - DETAILS
Attempted to contact patient.  Left message to call back.  Discussed with orthopedics who reports this is a non urgent finding and patient can follow up outpatient with Dr. Shook.  -Kendall Spence PA-C Spoke with patient, informed of new finding.  Reports has appointment with Dr. Shook tomorrow.

## 2020-01-29 ENCOUNTER — APPOINTMENT (OUTPATIENT)
Dept: ORTHOPEDIC SURGERY | Facility: CLINIC | Age: 50
End: 2020-01-29
Payer: OTHER MISCELLANEOUS

## 2020-01-29 DIAGNOSIS — S82.842D DISPLACED BIMALLEOLAR FRACTURE OF LEFT LOWER LEG, SUBSEQUENT ENCOUNTER FOR CLOSED FRACTURE WITH ROUTINE HEALING: ICD-10-CM

## 2020-01-29 PROCEDURE — 99213 OFFICE O/P EST LOW 20 MIN: CPT

## 2020-01-29 PROCEDURE — 73610 X-RAY EXAM OF ANKLE: CPT | Mod: LT

## 2020-01-31 PROBLEM — S82.842D CLOSED BIMALLEOLAR FRACTURE OF LEFT ANKLE WITH ROUTINE HEALING, SUBSEQUENT ENCOUNTER: Status: ACTIVE | Noted: 2019-06-04

## 2020-02-01 ENCOUNTER — TRANSCRIPTION ENCOUNTER (OUTPATIENT)
Age: 50
End: 2020-02-01

## 2020-02-20 ENCOUNTER — APPOINTMENT (OUTPATIENT)
Dept: FAMILY MEDICINE | Facility: CLINIC | Age: 50
End: 2020-02-20
Payer: COMMERCIAL

## 2020-02-27 ENCOUNTER — APPOINTMENT (OUTPATIENT)
Dept: FAMILY MEDICINE | Facility: CLINIC | Age: 50
End: 2020-02-27
Payer: COMMERCIAL

## 2020-02-27 ENCOUNTER — APPOINTMENT (OUTPATIENT)
Dept: UROLOGY | Facility: CLINIC | Age: 50
End: 2020-02-27

## 2020-03-04 ENCOUNTER — RX RENEWAL (OUTPATIENT)
Age: 50
End: 2020-03-04

## 2020-03-04 ENCOUNTER — APPOINTMENT (OUTPATIENT)
Dept: FAMILY MEDICINE | Facility: CLINIC | Age: 50
End: 2020-03-04
Payer: COMMERCIAL

## 2020-03-04 VITALS — DIASTOLIC BLOOD PRESSURE: 76 MMHG | SYSTOLIC BLOOD PRESSURE: 112 MMHG

## 2020-03-04 DIAGNOSIS — I82.492 ACUTE EMBOLISM AND THROMBOSIS OF OTHER SPECIFIED DEEP VEIN OF LEFT LOWER EXTREMITY: ICD-10-CM

## 2020-03-04 DIAGNOSIS — I82.4Z2 ACUTE EMBOLISM AND THROMBOSIS OF UNSPECIFIED DEEP VEINS OF LEFT DISTAL LOWER EXTREMITY: ICD-10-CM

## 2020-03-04 PROCEDURE — 36415 COLL VENOUS BLD VENIPUNCTURE: CPT

## 2020-03-04 PROCEDURE — 99214 OFFICE O/P EST MOD 30 MIN: CPT | Mod: 25

## 2020-03-04 RX ORDER — AMOXICILLIN AND CLAVULANATE POTASSIUM 875; 125 MG/1; MG/1
875-125 TABLET, COATED ORAL
Qty: 14 | Refills: 0 | Status: DISCONTINUED | COMMUNITY
Start: 2019-11-23

## 2020-03-04 RX ORDER — PREDNISONE 20 MG/1
20 TABLET ORAL
Qty: 6 | Refills: 0 | Status: DISCONTINUED | COMMUNITY
Start: 2019-11-23

## 2020-03-04 RX ORDER — PREDNISONE 50 MG/1
50 TABLET ORAL
Qty: 2 | Refills: 0 | Status: DISCONTINUED | COMMUNITY
Start: 2019-11-26

## 2020-03-04 RX ORDER — AZITHROMYCIN 250 MG/1
250 TABLET, FILM COATED ORAL
Qty: 6 | Refills: 0 | Status: DISCONTINUED | COMMUNITY
Start: 2019-11-14

## 2020-03-05 ENCOUNTER — TRANSCRIPTION ENCOUNTER (OUTPATIENT)
Age: 50
End: 2020-03-05

## 2020-03-05 LAB
T3FREE SERPL-MCNC: 3.8 PG/ML
T4 FREE SERPL-MCNC: 1.2 NG/DL
TSH SERPL-ACNC: 1.9 UIU/ML

## 2020-03-10 ENCOUNTER — TRANSCRIPTION ENCOUNTER (OUTPATIENT)
Age: 50
End: 2020-03-10

## 2020-03-10 LAB
BASOPHILS # BLD AUTO: 0.05 K/UL
BASOPHILS NFR BLD AUTO: 1.3 %
CHOLEST SERPL-MCNC: 227 MG/DL
CHOLEST/HDLC SERPL: 4.7 RATIO
EOSINOPHIL # BLD AUTO: 0.08 K/UL
EOSINOPHIL NFR BLD AUTO: 2.1 %
HCT VFR BLD CALC: 55.5 %
HDLC SERPL-MCNC: 48 MG/DL
HGB BLD-MCNC: 17.8 G/DL
IMM GRANULOCYTES NFR BLD AUTO: 0.5 %
LDLC SERPL CALC-MCNC: 155 MG/DL
LYMPHOCYTES # BLD AUTO: 1.64 K/UL
LYMPHOCYTES NFR BLD AUTO: 42.1 %
MAN DIFF?: NORMAL
MCHC RBC-ENTMCNC: 28.7 PG
MCHC RBC-ENTMCNC: 32.1 GM/DL
MCV RBC AUTO: 89.5 FL
MONOCYTES # BLD AUTO: 0.33 K/UL
MONOCYTES NFR BLD AUTO: 8.5 %
NEUTROPHILS # BLD AUTO: 1.78 K/UL
NEUTROPHILS NFR BLD AUTO: 45.5 %
PLATELET # BLD AUTO: 221 K/UL
RBC # BLD: 6.2 M/UL
RBC # FLD: 13.5 %
TESTOST BND SERPL-MCNC: 27.6 PG/ML
TESTOST SERPL-MCNC: 1022.9 NG/DL
TRIGL SERPL-MCNC: 119 MG/DL
WBC # FLD AUTO: 3.9 K/UL

## 2020-03-11 ENCOUNTER — TRANSCRIPTION ENCOUNTER (OUTPATIENT)
Age: 50
End: 2020-03-11

## 2020-03-17 NOTE — ED PROVIDER NOTE - CLINICAL SUMMARY MEDICAL DECISION MAKING FREE TEXT BOX
Refill request for two medications  Amphetamine-dextroamphetamine XR.  Last refill date on 2/14/2020. #30 caps.  lisdexamfetamine   Last refill date on 2/14/2020. #30 caps.  2/4/2020 Last office visit with Dr. Ceron  Unable to refill medication.  Out of protocol.  Please advise  Wt Readings from Last 1 Encounters:   11/03/17 78 kg        BP Readings from Last 2 Encounters:   02/04/20 120/74   10/05/18 102/68   ]    Lab Results   Component Value Date    SODIUM 141 07/31/2016    POTASSIUM 3.7 07/31/2016    CHLORIDE 104 07/31/2016    CO2 28 07/31/2016    BUN 13 07/31/2016    CREATININE 0.71 07/31/2016    GLUCOSE 99 07/31/2016     No results found for: HGBA1C  TSH (mcUnits/mL)   Date Value   07/31/2016 1.135     Lab Results   Component Value Date    CHOLESTEROL 169 06/14/2016    HDL 84 06/14/2016    CALCLDL 75 06/14/2016    TRIGLYCERIDE 52 06/14/2016     Lab Results   Component Value Date    AST 23 07/31/2016    GPT 28 07/31/2016    ALKPT 51 07/31/2016    BILIRUBIN 0.3 07/31/2016        Attending MD Del Cid: See Attending Statement

## 2020-03-22 ENCOUNTER — EMERGENCY (EMERGENCY)
Facility: HOSPITAL | Age: 50
LOS: 1 days | Discharge: HOME | End: 2020-03-22
Attending: EMERGENCY MEDICINE
Payer: COMMERCIAL

## 2020-03-22 VITALS
HEART RATE: 98 BPM | SYSTOLIC BLOOD PRESSURE: 130 MMHG | OXYGEN SATURATION: 97 % | TEMPERATURE: 98 F | RESPIRATION RATE: 14 BRPM | DIASTOLIC BLOOD PRESSURE: 93 MMHG

## 2020-03-22 DIAGNOSIS — Z96.7 PRESENCE OF OTHER BONE AND TENDON IMPLANTS: Chronic | ICD-10-CM

## 2020-03-22 PROCEDURE — 99282 EMERGENCY DEPT VISIT SF MDM: CPT

## 2020-03-22 PROCEDURE — 99053 MED SERV 10PM-8AM 24 HR FAC: CPT

## 2020-03-22 NOTE — ED PROVIDER NOTE - PATIENT PORTAL LINK FT
You can access the FollowMyHealth Patient Portal offered by [facility] by registering at the following website: [email or web address]. By joining [employer]’s FollowMyHealth portal, you will also be able to view your health information using other applications (apps) compatible with our system.

## 2020-03-22 NOTE — ED PROVIDER NOTE - CLINICAL SUMMARY MEDICAL DECISION MAKING FREE TEXT BOX
Victoria Tilley MD - Attending Physician: Pt here with R forearm wound s/p patient injury. No other concerns. Labs sent, low risk, declined PEP. F/u with S

## 2020-03-22 NOTE — ED PROVIDER NOTE - OBJECTIVE STATEMENT
Responded to [place] in ED. Scratched by patient to R forearm. No active bleeding. No other injuries

## 2020-03-22 NOTE — ED PROVIDER NOTE - CPE EDP MUSC NORM
The Service to Optometry order in workqueue [id number] requested on 9/24/2019 has been removed as, unable to contact. Ordering provider has been notified.    Please contact patient, if further communication is needed.    
normal...

## 2020-03-23 NOTE — ED POST DISCHARGE NOTE - DETAILS
03/23/20 @ approx 03:00am. Left VM with return number to call regarding results of HIV serology. Max LANGLEY 3/23/20: Pt called back, results given with understanding regarding nonreactive rapid HIV. Educated to speak and follow up with employee health. -Kiersten Saleem PA-C

## 2020-03-30 ENCOUNTER — TRANSCRIPTION ENCOUNTER (OUTPATIENT)
Age: 50
End: 2020-03-30

## 2020-04-16 ENCOUNTER — APPOINTMENT (OUTPATIENT)
Dept: UROLOGY | Facility: CLINIC | Age: 50
End: 2020-04-16

## 2020-04-16 ENCOUNTER — APPOINTMENT (OUTPATIENT)
Dept: FAMILY MEDICINE | Facility: CLINIC | Age: 50
End: 2020-04-16
Payer: COMMERCIAL

## 2020-04-16 PROCEDURE — 99213 OFFICE O/P EST LOW 20 MIN: CPT | Mod: 95

## 2020-04-16 NOTE — PHYSICAL EXAM
[Normal Sclera/Conjunctiva] : normal sclera/conjunctiva [PERRL] : pupils equal round and reactive to light [Normal Outer Ear/Nose] : the outer ears and nose were normal in appearance [No JVD] : no jugular venous distention [Normal] : affect was normal and insight and judgment were intact [de-identified] : Unable to examine due to telehealth visit

## 2020-04-16 NOTE — HISTORY OF PRESENT ILLNESS
[Home] : at home, [unfilled] , at the time of the visit. [Medical Office: ([facility])___] : at the medical office located in  [Patient] : the patient [Self] : self [FreeTextEntry4] : Debora Combs [Other: ___] : [unfilled] [FreeTextEntry6] : Doing well on ADD Meds. No side effects. No weight gain. No weight loss. No excessive jitteriness. No difficulties. Working on level of concentration\par Ambien for sleep late.

## 2020-04-26 ENCOUNTER — MESSAGE (OUTPATIENT)
Age: 50
End: 2020-04-26

## 2020-04-29 ENCOUNTER — APPOINTMENT (OUTPATIENT)
Dept: ORTHOPEDIC SURGERY | Facility: CLINIC | Age: 50
End: 2020-04-29

## 2020-04-30 LAB
SARS-COV-2 IGG SERPL IA-ACNC: <0.1 INDEX
SARS-COV-2 IGG SERPL QL IA: NEGATIVE

## 2020-05-06 ENCOUNTER — APPOINTMENT (OUTPATIENT)
Dept: DISASTER EMERGENCY | Facility: HOSPITAL | Age: 50
End: 2020-05-06

## 2020-05-20 ENCOUNTER — APPOINTMENT (OUTPATIENT)
Dept: FAMILY MEDICINE | Facility: CLINIC | Age: 50
End: 2020-05-20

## 2020-06-10 ENCOUNTER — APPOINTMENT (OUTPATIENT)
Dept: UROLOGY | Facility: CLINIC | Age: 50
End: 2020-06-10
Payer: COMMERCIAL

## 2020-06-10 PROCEDURE — 99214 OFFICE O/P EST MOD 30 MIN: CPT | Mod: 95

## 2020-06-10 NOTE — ASSESSMENT
[FreeTextEntry1] : The patient presents for telehealth consultation.  He has not been seen since last August.  He has not obtained the 3-month blood studies due to personal issues.  He had been seen by his medical doctor who eventually got blood studies and found his hematocrit was in the mid 50s and testosterone over thousand.  He was taken off testosterone 3 months ago.  He states his energy level presently is very low.  Patient was injecting 100 mg of testosterone cypionate weekly. The patient reports that his erections and libido is still good. He has been using 50 mg of sildenafil.\par \par We discussed the need to get new blood studies prior to restarting testosterone.  I will also send him out the informed consent for testosterone supplementation for him to sign.  He will be seeing his medical doctor tomorrow for the blood studies.  I have listed the bloods that I need in [employer].  I will then discuss the results with him in 2 weeks and decide on the proper dosage for testosterone supplementation.  He is in agreement with this plan.\par \par Telehealth Consultation: 30 minutes  10 minutes reviewing his history and discussing prior results.  20 minutes discussing various treatment options, writing medication prescriptions, requests for lab testing and writing his note. There was also additional time in preparing for the visit and assisting the patient with technology issues he was having with the telehealth platform.

## 2020-06-10 NOTE — PHYSICAL EXAM
[General Appearance - Well Developed] : well developed [General Appearance - Well Nourished] : well nourished [Normal Appearance] : normal appearance [Well Groomed] : well groomed [Oriented To Time, Place, And Person] : oriented to person, place, and time [General Appearance - In No Acute Distress] : no acute distress [Affect] : the affect was normal [Mood] : the mood was normal [Not Anxious] : not anxious

## 2020-06-11 ENCOUNTER — APPOINTMENT (OUTPATIENT)
Dept: FAMILY MEDICINE | Facility: CLINIC | Age: 50
End: 2020-06-11
Payer: COMMERCIAL

## 2020-06-11 VITALS
DIASTOLIC BLOOD PRESSURE: 88 MMHG | HEIGHT: 67 IN | BODY MASS INDEX: 32.96 KG/M2 | WEIGHT: 210 LBS | SYSTOLIC BLOOD PRESSURE: 130 MMHG

## 2020-06-11 LAB
BILIRUB UR QL STRIP: 0
CLARITY UR: CLEAR
COLLECTION METHOD: NORMAL
GLUCOSE UR-MCNC: 0
HCG UR QL: 0.2 EU/DL
HGB UR QL STRIP.AUTO: 0
KETONES UR-MCNC: 0
LEUKOCYTE ESTERASE UR QL STRIP: 0
NITRITE UR QL STRIP: 0
PH UR STRIP: 6
PROT UR STRIP-MCNC: 0
SP GR UR STRIP: 1.01

## 2020-06-11 PROCEDURE — 81002 URINALYSIS NONAUTO W/O SCOPE: CPT

## 2020-06-11 PROCEDURE — 36415 COLL VENOUS BLD VENIPUNCTURE: CPT

## 2020-06-11 PROCEDURE — 99213 OFFICE O/P EST LOW 20 MIN: CPT | Mod: 25

## 2020-06-11 NOTE — HISTORY OF PRESENT ILLNESS
[FreeTextEntry1] : F/U, pt. needs thyroid check, testosterone and bwk for Dr. Quintana, it is in the chart. Pt. also would like to get an MRI of L shoulder, pt. also feels pain in both hands, R hand is worse.\par Has an appt with ortho. Has had  left shoulder pain x 3 years

## 2020-06-11 NOTE — PHYSICAL EXAM
[Normal] : soft, non-tender, non-distended, no masses palpated, no HSM and normal bowel sounds [de-identified] : FROM to left shoulder

## 2020-06-12 LAB
CHOLEST SERPL-MCNC: 135 MG/DL
CHOLEST/HDLC SERPL: 3.6 RATIO
HDLC SERPL-MCNC: 38 MG/DL
LDLC SERPL CALC-MCNC: 80 MG/DL
TRIGL SERPL-MCNC: 87 MG/DL

## 2020-06-18 ENCOUNTER — APPOINTMENT (OUTPATIENT)
Dept: RADIOLOGY | Facility: CLINIC | Age: 50
End: 2020-06-18

## 2020-06-18 ENCOUNTER — APPOINTMENT (OUTPATIENT)
Dept: MRI IMAGING | Facility: CLINIC | Age: 50
End: 2020-06-18

## 2020-06-18 ENCOUNTER — TRANSCRIPTION ENCOUNTER (OUTPATIENT)
Age: 50
End: 2020-06-18

## 2020-06-24 ENCOUNTER — APPOINTMENT (OUTPATIENT)
Dept: UROLOGY | Facility: CLINIC | Age: 50
End: 2020-06-24
Payer: COMMERCIAL

## 2020-06-24 LAB
25(OH)D3 SERPL-MCNC: 46.8 NG/ML
ALBUMIN SERPL ELPH-MCNC: 4.5 G/DL
ALP BLD-CCNC: 75 U/L
ALT SERPL-CCNC: 46 U/L
ANION GAP SERPL CALC-SCNC: 12 MMOL/L
AST SERPL-CCNC: 28 U/L
BASOPHILS # BLD AUTO: 0.06 K/UL
BASOPHILS NFR BLD AUTO: 1.1 %
BILIRUB SERPL-MCNC: 0.3 MG/DL
BUN SERPL-MCNC: 16 MG/DL
CALCIUM SERPL-MCNC: 9.2 MG/DL
CHLORIDE SERPL-SCNC: 104 MMOL/L
CO2 SERPL-SCNC: 23 MMOL/L
CREAT SERPL-MCNC: 1.02 MG/DL
EOSINOPHIL # BLD AUTO: 0.1 K/UL
EOSINOPHIL NFR BLD AUTO: 1.9 %
ESTRADIOL SERPL-MCNC: 27 PG/ML
FSH SERPL-MCNC: 1.5 IU/L
GLUCOSE SERPL-MCNC: 92 MG/DL
HCT VFR BLD CALC: 42.4 %
HGB BLD-MCNC: 14.4 G/DL
IMM GRANULOCYTES NFR BLD AUTO: 0.4 %
LH SERPL-ACNC: 0.6 IU/L
LYMPHOCYTES # BLD AUTO: 1.73 K/UL
LYMPHOCYTES NFR BLD AUTO: 32.6 %
MAN DIFF?: NORMAL
MCHC RBC-ENTMCNC: 30.2 PG
MCHC RBC-ENTMCNC: 34 GM/DL
MCV RBC AUTO: 88.9 FL
MONOCYTES # BLD AUTO: 0.59 K/UL
MONOCYTES NFR BLD AUTO: 11.1 %
NEUTROPHILS # BLD AUTO: 2.81 K/UL
NEUTROPHILS NFR BLD AUTO: 52.9 %
PLATELET # BLD AUTO: 226 K/UL
POTASSIUM SERPL-SCNC: 4.1 MMOL/L
PROLACTIN SERPL-MCNC: 8.8 NG/ML
PROT SERPL-MCNC: 6.4 G/DL
PSA SERPL-MCNC: 1.2 NG/ML
RBC # BLD: 4.77 M/UL
RBC # FLD: 13.6 %
SODIUM SERPL-SCNC: 139 MMOL/L
WBC # FLD AUTO: 5.31 K/UL

## 2020-06-24 PROCEDURE — 99214 OFFICE O/P EST MOD 30 MIN: CPT | Mod: 95

## 2020-06-24 NOTE — HISTORY OF PRESENT ILLNESS
[Home] : at home, [unfilled] , at the time of the visit. [Medical Office: ([facility])___] : at the medical office located in  [Verbal consent obtained from patient] : the patient, [unfilled] [FreeTextEntry1] : The patient-doctor. relationship has been established in a face-to-face fashion on real-time video audio HIPAA compliant communication using telemedicine software.  The patient's identity has been confirmed.  The patient was previously emailed a copy of the telemedicine consent.  The patient has had a chance to review and has now given verbal consent and has requested care to be assessed and treated through telemedicine. The patient understands there may be limitations in this process and that they need not need further follow-up care in the office and/or hospital settings.  We were unable to use the [employer] platform and an alternative platform was utilized.\par \par Verbal consent was given on Wednesday, Ayanna 10, 2020 at 1:45 PM by the patient.  It was requested by the physician.  A written consent was previously sent for the patient to sign and return.\par \par The patient presents for a telehealth consultation to review his recent blood studies and decide what his dose of testosterone should be.  He had a phlebotomy in the interim.  He had been seen by his medical doctor who eventually got blood studies and found his hematocrit was in the mid 50s and testosterone over thousand.  He was taken off testosterone 3 months ago.  He states his energy level presently is very low.  Patient was injecting 100 mg of testosterone cypionate weekly. The patient reports that his erections and libido is still good. He has been using 50 mg of sildenafil. \par \par PMH: Patient initially presented with the chief complaint of testosterone for evaluation. From the age of [age] the patient had been abusing androgens. He then had 2 children with her partner who he subsequently . For the past year and a half he's been placed on testosterone and is taking 150 mg a week injectable testosterone cyprionate. He has no known drug allergies.  His past medical history demonstrates hypertension and hypercholesterolemia.  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  He does smoke and drinks only socially.  He has no known drug allergies.  His review of systems is non-contributory. His family history is not significant.\par

## 2020-06-24 NOTE — ASSESSMENT
[FreeTextEntry1] : The patient presents for a telehealth consultation to review his recent blood studies and decide what his dose of testosterone should be.  He had a phlebotomy in the interim.  He had been seen by his medical doctor who eventually got blood studies and found his hematocrit was in the mid 50s and testosterone over thousand.  He was taken off testosterone 3 months ago.  He states his energy level presently is very low.  Patient was injecting 100 mg of testosterone cypionate weekly. The patient reports that his erections and libido is still good. He has been using 50 mg of sildenafil.\par \par Blood studies returned except for his testosterone which was pending.  All were normal including his hematocrit.  We discussed restarting his testosterone injections at 100 mg a week and repeating his blood studies in 3 weeks.  His primary [place] medical doctor will repeat the blood studies and we will discuss the results by telehealth 1 to 2 weeks after.\par \par REF: [id number]\par \par Telehealth Consultation: 30 minutes  10 minutes reviewing his history and discussing prior results.  20 minutes discussing various treatment options, writing medication prescriptions (HCS), requests for lab testing and writing his note. There was also additional time in preparing for the visit and assisting the patient with technology issues he was having with the telehealth platform.

## 2020-06-29 ENCOUNTER — APPOINTMENT (OUTPATIENT)
Dept: ORTHOPEDIC SURGERY | Facility: CLINIC | Age: 50
End: 2020-06-29

## 2020-07-01 ENCOUNTER — TRANSCRIPTION ENCOUNTER (OUTPATIENT)
Age: 50
End: 2020-07-01

## 2020-07-09 ENCOUNTER — TRANSCRIPTION ENCOUNTER (OUTPATIENT)
Age: 50
End: 2020-07-09

## 2020-07-20 ENCOUNTER — RX RENEWAL (OUTPATIENT)
Age: 50
End: 2020-07-20

## 2020-08-03 ENCOUNTER — TRANSCRIPTION ENCOUNTER (OUTPATIENT)
Age: 50
End: 2020-08-03

## 2020-08-20 ENCOUNTER — APPOINTMENT (OUTPATIENT)
Dept: FAMILY MEDICINE | Facility: CLINIC | Age: 50
End: 2020-08-20
Payer: COMMERCIAL

## 2020-08-20 VITALS
SYSTOLIC BLOOD PRESSURE: 120 MMHG | DIASTOLIC BLOOD PRESSURE: 88 MMHG | HEART RATE: 77 BPM | BODY MASS INDEX: 32.89 KG/M2 | WEIGHT: 210 LBS | OXYGEN SATURATION: 97 % | TEMPERATURE: 97.7 F

## 2020-08-20 PROCEDURE — 36415 COLL VENOUS BLD VENIPUNCTURE: CPT

## 2020-08-20 PROCEDURE — 99214 OFFICE O/P EST MOD 30 MIN: CPT | Mod: 25

## 2020-08-20 NOTE — PHYSICAL EXAM
[General Appearance - Alert] : alert [General Appearance - In No Acute Distress] : in no acute distress [Oropharynx] : the oropharynx was normal [Outer Ear] : the ears and nose were normal in appearance [Neck Appearance] : the appearance of the neck was normal [Neck Cervical Mass (___cm)] : no neck mass was observed [Jugular Venous Distention Increased] : there was no jugular-venous distention [Thyroid Diffuse Enlargement] : the thyroid was not enlarged [Thyroid Nodule] : there were no palpable thyroid nodules [] : no respiratory distress [Heart Rate And Rhythm] : heart rate was normal and rhythm regular [Auscultation Breath Sounds / Voice Sounds] : lungs were clear to auscultation bilaterally [Heart Sounds Gallop] : no gallops [Heart Sounds] : normal S1 and S2 [Heart Sounds Pericardial Friction Rub] : no pericardial rub [Murmurs] : no murmurs

## 2020-08-21 LAB
ALBUMIN SERPL ELPH-MCNC: 4.9 G/DL
ALP BLD-CCNC: 80 U/L
ALT SERPL-CCNC: 35 U/L
ANION GAP SERPL CALC-SCNC: 14 MMOL/L
AST SERPL-CCNC: 21 U/L
BASOPHILS # BLD AUTO: 0.05 K/UL
BASOPHILS NFR BLD AUTO: 1 %
BILIRUB SERPL-MCNC: 0.3 MG/DL
BUN SERPL-MCNC: 18 MG/DL
CALCIUM SERPL-MCNC: 9.5 MG/DL
CHLORIDE SERPL-SCNC: 100 MMOL/L
CHOLEST SERPL-MCNC: 128 MG/DL
CHOLEST/HDLC SERPL: 3.1 RATIO
CO2 SERPL-SCNC: 27 MMOL/L
CREAT SERPL-MCNC: 1.24 MG/DL
EOSINOPHIL # BLD AUTO: 0.1 K/UL
EOSINOPHIL NFR BLD AUTO: 2.1 %
GLUCOSE SERPL-MCNC: 88 MG/DL
HCT VFR BLD CALC: 53.5 %
HDLC SERPL-MCNC: 41 MG/DL
HGB BLD-MCNC: 17.3 G/DL
IMM GRANULOCYTES NFR BLD AUTO: 0.2 %
LDLC SERPL CALC-MCNC: 76 MG/DL
LYMPHOCYTES # BLD AUTO: 1.61 K/UL
LYMPHOCYTES NFR BLD AUTO: 33.6 %
MAN DIFF?: NORMAL
MCHC RBC-ENTMCNC: 29.3 PG
MCHC RBC-ENTMCNC: 32.3 GM/DL
MCV RBC AUTO: 90.7 FL
MONOCYTES # BLD AUTO: 0.51 K/UL
MONOCYTES NFR BLD AUTO: 10.6 %
NEUTROPHILS # BLD AUTO: 2.51 K/UL
NEUTROPHILS NFR BLD AUTO: 52.5 %
PLATELET # BLD AUTO: 227 K/UL
POTASSIUM SERPL-SCNC: 4.3 MMOL/L
PROT SERPL-MCNC: 6.8 G/DL
PSA FREE FLD-MCNC: 25 %
PSA FREE SERPL-MCNC: 0.33 NG/ML
PSA SERPL-MCNC: 1.36 NG/ML
RBC # BLD: 5.9 M/UL
RBC # FLD: 12.4 %
RHEUMATOID FACT SER QL: <10 IU/ML
SODIUM SERPL-SCNC: 141 MMOL/L
T3FREE SERPL-MCNC: 3.13 PG/ML
T4 FREE SERPL-MCNC: 1.5 NG/DL
TRIGL SERPL-MCNC: 57 MG/DL
TSH SERPL-ACNC: 1.2 UIU/ML
WBC # FLD AUTO: 4.79 K/UL

## 2020-08-24 LAB — ANA SER IF-ACNC: NEGATIVE

## 2020-08-26 ENCOUNTER — RX RENEWAL (OUTPATIENT)
Age: 50
End: 2020-08-26

## 2020-08-26 ENCOUNTER — EMERGENCY (EMERGENCY)
Facility: HOSPITAL | Age: 50
LOS: 1 days | Discharge: HOME | End: 2020-08-26
Attending: EMERGENCY MEDICINE
Payer: COMMERCIAL

## 2020-08-26 VITALS
TEMPERATURE: 98 F | HEIGHT: 68 IN | OXYGEN SATURATION: 98 % | DIASTOLIC BLOOD PRESSURE: 77 MMHG | WEIGHT: 207.9 LBS | SYSTOLIC BLOOD PRESSURE: 135 MMHG | HEART RATE: 85 BPM | RESPIRATION RATE: 17 BRPM

## 2020-08-26 DIAGNOSIS — Z96.7 PRESENCE OF OTHER BONE AND TENDON IMPLANTS: Chronic | ICD-10-CM

## 2020-08-26 PROCEDURE — 99284 EMERGENCY DEPT VISIT MOD MDM: CPT | Mod: 25

## 2020-08-26 PROCEDURE — 73590 X-RAY EXAM OF LOWER LEG: CPT | Mod: 26,LT

## 2020-08-26 PROCEDURE — 99053 MED SERV 10PM-8AM 24 HR FAC: CPT

## 2020-08-26 PROCEDURE — 73590 X-RAY EXAM OF LOWER LEG: CPT

## 2020-08-26 PROCEDURE — 96372 THER/PROPH/DIAG INJ SC/IM: CPT

## 2020-08-26 PROCEDURE — 73620 X-RAY EXAM OF FOOT: CPT | Mod: 26,LT

## 2020-08-26 PROCEDURE — 73620 X-RAY EXAM OF FOOT: CPT

## 2020-08-26 PROCEDURE — 99284 EMERGENCY DEPT VISIT MOD MDM: CPT

## 2020-08-26 RX ORDER — KETOROLAC TROMETHAMINE 30 MG/ML
30 SYRINGE (ML) INJECTION ONCE
Refills: 0 | Status: DISCONTINUED | OUTPATIENT
Start: 2020-08-26 | End: 2020-08-26

## 2020-08-26 RX ADMIN — Medication 30 MILLIGRAM(S): at 23:29

## 2020-08-26 NOTE — ED PROVIDER NOTE - CARE PLAN
Developed pneumonia on doxy. Diagnosed with pneumonia last night. Oral surgeon concerned for incision site infection. She was restarted on Augmentin and zpak was added. Agree with recommendations. No further questions.    Principal Discharge DX:	Left ankle pain, unspecified chronicity

## 2020-08-26 NOTE — ED PROVIDER NOTE - NSFOLLOWUPINSTRUCTIONS_ED_ALL_ED_FT
You were seen by ortho. Follow up with Dr. Shook tomorrow     Continue to take NSAIDS for mild to moderate pain and  your prescription for oxycodone for severe pain.     Return to the E.D for new or worsening symptoms.

## 2020-08-26 NOTE — ED ADULT NURSE NOTE - OBJECTIVE STATEMENT
Pt 50 y/o male, AxOx3, presents to ED reporting pain x 4 days ago on left ankle. PMH of HTN, ankle fx of left ankle w/ plate placement. Pt reports pain 8/10 pain at this time, worsened with ambulation. Pt is well appearing, speaking full sentences without difficulty. Breathing spontaneous and unlabored. Pt ambulatory with pain. Upon assessment, pain to outer left calk down to left ankle. Pt denies CP, SOB, HA, vision changes, n/v/d, fevers chills, abdominal pain. X-rays completed. Pt 48 y/o male, AxOx3, presents to ED reporting pain x 4 days ago on left ankle. PMH of HTN, ankle fx of left ankle w/ plate placement. Pt reports pain 8/10 pain at this time, worsened with ambulation. Pt is well appearing, speaking full sentences without difficulty. Breathing spontaneous and unlabored. Pt ambulatory with pain. Upon assessment, pain lateral left calf down to left ankle. Pt denies CP, SOB, HA, vision changes, n/v/d, fevers chills, abdominal pain. X-rays completed.

## 2020-08-26 NOTE — ED PROVIDER NOTE - CARE PROVIDER_API CALL
Shade Shook  ORTHOPAEDIC SURGERY  [address], [address]  [place] [address]  Phone: [phone]  Fax: [phone]  Established Patient  Follow Up Time: Urgent

## 2020-08-26 NOTE — ED PROVIDER NOTE - OBJECTIVE STATEMENT
Attending MD Del Cid: I personally have seen and examined this patient.  Resident note reviewed and agree on plan of care and except where noted.  See below for details.     Seen in [place]    49M with PMH/PSH including HTN on HCTZ, Losartan, HLD, R IHR, s/p ORIF of L bimalleolar fx (5/30/19, Dr. Shook) presents to the ED with L foot and lower leg pain.  Reports pain at the L foot and L lateral lower leg.  Reports developed pain 4 days ago, denies inciting event.  Reports noted after work one day while walking to car.  Denies fall, new trauma.  Reports since then pain has been worsening, now walking with limp secondary to pain.  Reports feels a click when ranging L ankle.  Denies fevers, chills, dizziness, weakness. Denies numbness, weakness or tingling in extremities. Denies chest pain, shortness of breath, palpitations. Denies abdominal pain, nausea, vomiting, diarrhea, blood in stools. Denies dysuria, hematuria, change in urinary habits including frequency, urgency. A ten (10) point review of systems was negative other than as stated in the HPI or elsewhere in the chart.     Exam:   General: NAD  HENT: head NCAT, airway patent with moist mucous membranes  Eyes: PERRL  Lungs: lungs CTAB with good inspiratory effort, no wheezing, no rhonchi, no rales  Cardiac: +S1S2, no m/r/g  GI: abdomen soft with +BS, NT, ND  : no CVAT  MSK: FROM at neck, no tenderness to midline palpation, no stepoffs along length of spine, no calf tenderness, swelling, erythema or warmth, +tenderness to palpation at lateral middle third of L lower leg, +tenderness to palpation at anterior aspect of both lateral and medial malleoli of L ankle, +tenderness to palpation at the middle medial aspect of sole of foot, no erythema, no fluctuance, no open wounds, cap refill <2s, +2 DPs, well healed surgical scars, agree click palpable with ranging L ankle but FROM at L ankle  Neuro: moving all extremities with 5/5 strength bilateral upper and lower extremities, good and equal  strength bilaterally, sensory grossly intact, no gross neuro deficits  Psych: normal mood and affect   Skin: warm and dry    A/P: 49M with L lower leg and foot pain, s/p ORIF in 5/2019, DDx hardware dislodgement, arthritic changes, MSK pain, will give Toradol (patient request), obtain XRs of foot and tib/fib, +/- ortho, reassess

## 2020-08-26 NOTE — ED PROVIDER NOTE - PROGRESS NOTE DETAILS
Attending MD Del Cid: XRs reviewed, change with appearance of proximal screw noted, ortho consulted, will await

## 2020-08-26 NOTE — ED PROVIDER NOTE - PATIENT PORTAL LINK FT
You can access the FollowMyHealth Patient Portal offered by [facility] by registering at the following website: [email or web address]. By joining [employer]’s FollowMyHealth portal, you will also be able to view your health information using other applications (apps) compatible with our system.

## 2020-08-27 RX ORDER — OXYCODONE HYDROCHLORIDE 5 MG/1
1 TABLET ORAL
Qty: 8 | Refills: 0
Start: 2020-08-27 | End: 2020-08-28

## 2020-08-27 NOTE — CONSULT NOTE ADULT - SUBJECTIVE AND OBJECTIVE BOX
49 M with new onset R ankle pain without any inciting trauma. Pt had prior R ankle ORIF with Dr. Shook in May of 2020 due to an open fracture dislocation. Pt states for the past 4 days he has had nagging ankle pain with ambulation but has been able to walk. No trauma to the area. Pain is provoked by ankle ROM. He said he is tender to his medial and lateral malleolus and feels clicking in his ankle with ROM. He denies any fevers or chills. denies any numbness or tingling. he also complains of plantar fascia pain and tightness as well as some pain at his achilles insertion.     PAST MEDICAL & SURGICAL HISTORY:  HTN (hypertension)  Unilateral inguinal hernia without obstruction or gangrene, recurrence not specified  Hypothyroidism  Hypercholesteremia  HTN (hypertension)  S/P ORIF (open reduction internal fixation) fracture: left ankle  No significant past surgical history      Vital Signs Last 24 Hrs  T(C): 36.7 (26 Aug 2020 23:00), Max: 36.7 (26 Aug 2020 23:00)  T(F): 98.1 (26 Aug 2020 23:00), Max: 98.1 (26 Aug 2020 23:00)  HR: 85 (26 Aug 2020 23:00) (85 - 85)  BP: 135/77 (26 Aug 2020 23:00) (135/77 - 135/77)  BP(mean): --  RR: 17 (26 Aug 2020 23:00) (17 - 17)  SpO2: 98% (26 Aug 2020 23:00) (98% - 98%)    Imaging:   XR R ankle:   acute breakage of proximal syndemotic screw, s/p R ankle orif  negative for ankle fracture or dislocation  well healing R ankle with healing R fibula fracture    Exam:  GEN:  NAD  R ankle:  skin intact, well healing surgical incision, with no surrounding erythema or induration  TTP over medial and lateral malleolus  Pain with ankle ROM: plantar/dorsiflexion  Palpable deep clicking in R ankle with ankle ROM  + EHL/FHL/TA/GSC  SILT   DP+  NTTP over proximal fibula    Secondary Survey: No TTP over bony prominences, SILT, palpable pulses, full/painless range of motion, compartments soft

## 2020-08-27 NOTE — CONSULT NOTE ADULT - ASSESSMENT
49 M s/p R ankle ORIF with new proximal syndesmotic screw breakage    - Pt discussed that he had prior conversation at his previous visit with Dr. Shook about the syndesmotic screws which may cause some R ankle pain, noted at that visit that the screws were intact, now with new R proximal screw breakage  - analgesia as needed  - WBAT RLE, recommend gentle activity of R ankle  - Pt ortho stable for discharge  - Pt should follow up with Dr. Shook after discharge, please call his office to make an appointment\  - Pt may require syndesmotic screw removal in the future, discussed with pt who understands   - Will discuss with attending and advise further if plan changes

## 2020-09-08 ENCOUNTER — TRANSCRIPTION ENCOUNTER (OUTPATIENT)
Age: 50
End: 2020-09-08

## 2020-09-08 LAB
TESTOST BND SERPL-MCNC: 1.7 PG/ML
TESTOST SERPL-MCNC: 104.3 NG/DL

## 2020-10-01 ENCOUNTER — NON-APPOINTMENT (OUTPATIENT)
Age: 50
End: 2020-10-01

## 2020-10-01 ENCOUNTER — APPOINTMENT (OUTPATIENT)
Dept: CARDIOLOGY | Facility: CLINIC | Age: 50
End: 2020-10-01
Payer: COMMERCIAL

## 2020-10-01 VITALS
BODY MASS INDEX: 36.1 KG/M2 | HEIGHT: 67 IN | RESPIRATION RATE: 16 BRPM | DIASTOLIC BLOOD PRESSURE: 72 MMHG | HEART RATE: 85 BPM | TEMPERATURE: 98.1 F | WEIGHT: 230 LBS | OXYGEN SATURATION: 98 % | SYSTOLIC BLOOD PRESSURE: 118 MMHG

## 2020-10-01 PROCEDURE — 99214 OFFICE O/P EST MOD 30 MIN: CPT

## 2020-10-01 PROCEDURE — 93000 ELECTROCARDIOGRAM COMPLETE: CPT

## 2020-10-01 RX ORDER — ALBUTEROL SULFATE 90 UG/1
108 (90 BASE) AEROSOL, METERED RESPIRATORY (INHALATION)
Qty: 8 | Refills: 0 | Status: DISCONTINUED | COMMUNITY
Start: 2019-11-26 | End: 2020-10-01

## 2020-10-01 RX ORDER — SYRINGE W-NEEDLE,DISPOSAB,3 ML 25GX5/8"
22G X 1-1/2" SYRINGE, EMPTY DISPOSABLE MISCELLANEOUS
Qty: 24 | Refills: 3 | Status: DISCONTINUED | COMMUNITY
Start: 2018-05-17 | End: 2020-10-01

## 2020-10-01 RX ORDER — ISONIAZID 300 MG/1
300 TABLET ORAL WEEKLY
Qty: 39 | Refills: 0 | Status: DISCONTINUED | COMMUNITY
Start: 2019-04-25 | End: 2020-10-01

## 2020-10-01 RX ORDER — HYDROCHLOROTHIAZIDE 25 MG/1
25 TABLET ORAL DAILY
Qty: 90 | Refills: 0 | Status: DISCONTINUED | COMMUNITY
Start: 2019-11-05 | End: 2020-10-01

## 2020-10-01 RX ORDER — LOSARTAN POTASSIUM 50 MG/1
50 TABLET, FILM COATED ORAL DAILY
Qty: 90 | Refills: 3 | Status: DISCONTINUED | COMMUNITY
Start: 2019-11-15 | End: 2020-10-01

## 2020-10-01 NOTE — REASON FOR VISIT
[FreeTextEntry1] : This is a 49 year old patient s/p hospitalization for palpitations [place].\par \par His concerns include:\par 1. ECG abnormalities.\par 2. A strong family history for premature CAD\par 3. History of hyperlipidemia\par 4. A prior history of hypertension\par 5. Prior history of obesity.\par \par

## 2020-10-01 NOTE — HISTORY OF PRESENT ILLNESS
[FreeTextEntry1] : For the last two months he has been experiencing intermittent fluttering sensation which can last several minutes. Denies any associated symptoms but is very concerned because there is a family history of cancer and CAD\par \par On 9/29/20  with  worsening palpitations x 2 days  he went to [place] [facility], he was not admitted..  Work up included CTA of coronaries  which revealed 25-49% stenosis of the prox LAD and prox. RCA with  less then 25%, otherwise normal study. Mentions PVCs in his ECG.\par \par Saw Dr. Christine/ cardiology of [place] and had a nuclear stress test , echo and carotid.  By his report has a small " whole in his heart". Results not available, office is closed for 1 week. \par \par Blood work collected on 9/29/20 will be reviewed.\par \par On Amphetamine-Dextroamphetamine for ADHD\par Uses no alcohol or caffeine-containing beverages.\par \par Continues to work out 5 days a week using both resistance and aerobic exercise without difficulty or limitation.\par No CP during his workouts\par \par HX of AC therapy / Eliquis for a DVT after breaking his ankle.\par \par Had a calcium scoring done about 6 years ago. Those results were not available.\par \par \par \par \par

## 2020-10-01 NOTE — ASSESSMENT
[FreeTextEntry1] : ECG- SR at 85 bpm LAD No ST Or T wave abn\par \par Lab data \par       9/29/20\par TC      135\par LDL      91\par HDL     34\par Tri.      96\par Creat. 1.25\par HGB 16.9\par \par Laboratory data 11/5/19:\par Creatinine 1.45\par \par AST 51\par Electrolytes normal.\par \par Cholesterol 138\par HDL              25\par LDL              87\par Triglycerides 129\par A1c 5.3\par \par \par CT angiogram 9/29/2020:\par Left main:  no plaquing\par LAD:          25 to 49% mild mid vessel atherosclerotic plaquing\par Circumflex: no plaque\par Ramus:       no plaque\par RCA :          mild proximal  < 25% proximal RCA plaque\par Calcium score 179\par \par Echocardiogram 7/18/18:  Mild concentric hypertrophy with overall normal left ventricular systolic function.  LVEF 65%.  Focal aortic sclerosis.  Otherwise unremarkable echocardiogram. \par \par Carotid Doppler 7/18/18 showed no intimal thickening or plaquing.  Study is within normal limits.  \par \par Exercise stress testing 7/2/18:  Patient exercised on a standard Delta protocol for a total of 12 minutes and 15 seconds with a peak heart rate of 155 (90% maximally predicted).  Normal blood pressure response.  No significant symptoms.  Rare apices.  No ischemic ECG changes.   \par \par The exercise stress test shows excellent exercise tolerance without any exercise induced ischemia, arrhythmia, or hypertension.  \par \par Impression:  \par 1.  He has evidence of mild atherosclerotic disease of the LAD and RCA \par      CTA of coronaries with mild nonobstructive disease to the LAD\par \par 2.  HX of focal chest pain which has not recurred.\par \par 3.  Frequent palpitations over several weeks duration without any clear-cut etiology.\par      Intermittent palpitations with no chest pain which precipitated ER and cardiology work up.  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 \par \par 4.  He does have mild concentric hypertrophy seen on last echo done in our practice. \par      There is an outside echocardiogram performed this week that is not available to review\par \par 5.  The patient has a history of hypertension controlled on Losartan-HCTZ.\par \par 6.  ECG today stable  with no ischemic changes or PVCs seen. \par \par 7. There is a very strong Fam Hx including  Father - CABG at 48 y/o and Brother stents x 3 at 42 y/o.\par 8.  Up 20 lbs since Aug.\par \par \par Plan\par 1. Goal of therapy for  LDL <70 with new diagnosis of CAD.  Will increase Atorvastatin to 20 mg and have repeat     BW in 3 months\par \par 2.Will complete a 48 hours holter study to capture any arrhythmias. Once reviewed this will determine  if  any       adjustments to his antihypertensives such as mixing in a beta-blocker\par \par 3. Continue to F/U with PCP as scheduled and have all blood work faxed to our office reflecting the increase     Lipitor dose.\par \par 4.. Avoid caffeine, ETOH, and pre-work out\par \par Clinical follow up 3 months \par  \par

## 2020-10-28 NOTE — ED ADULT NURSE NOTE - NS ED NURSE LEVEL OF CONSCIOUSNESS SPEECH
Met with patient while she is here for simulation and 1 brain radiation therapy treatment.  Offered her words of comfort.  Patient is scheduled tomorrow to go to Neurology in [place].  Patient is to have a repeat MRI.  Again offered her words of encouragement.  Patient denies needs from me at this time.  Her  will drive her there.  Does have video for with Medical Oncology follow-up from hospital tomorrow.  I will join in as well.  Patient has follow-up next week with CT and labs prior to Dr. Modesto OJEDA   Speaking Coherently

## 2020-10-30 ENCOUNTER — APPOINTMENT (OUTPATIENT)
Dept: UROLOGY | Facility: CLINIC | Age: 50
End: 2020-10-30
Payer: COMMERCIAL

## 2020-10-30 PROCEDURE — 99214 OFFICE O/P EST MOD 30 MIN: CPT | Mod: 95

## 2020-10-30 NOTE — HISTORY OF PRESENT ILLNESS
[Home] : at home, [unfilled] , at the time of the visit. [Medical Office: ([facility])___] : at the medical office located in  [Verbal consent obtained from patient] : the patient, [unfilled] [FreeTextEntry1] : The patient-doctor. relationship has been established in a face-to-face fashion on real-time video audio HIPAA compliant communication using telemedicine software.  The patient's identity has been confirmed.  The patient was previously emailed a copy of the telemedicine consent.  The patient has had a chance to review and has now given verbal consent and has requested care to be assessed and treated through telemedicine. The patient understands there may be limitations in this process and that they need not need further follow-up care in the office and/or hospital settings.  We were unable to use the [employer] platform and an alternative platform was utilized.\par \par Verbal consent was given on Wednesday, Ayanna 10, 2020 at 1:45 PM by the patient.  It was requested by the physician.  A written consent was previously sent for the patient to sign and return.\par \par The patient presents for a telehealth consultation to review his recent blood studies and decide what his dose of testosterone should be.  Patient has resumed injecting 100 mg of testosterone cypionate weekly. The patient reports that his erections and libido is still good. He has been using 100 mg of sildenafil. \par \par PMH: Patient initially presented with the chief complaint of testosterone for evaluation. From the age of [age] the patient had been abusing androgens. He then had 2 children with her partner who he subsequently . For the past year and a half he's been placed on testosterone and is taking 150 mg a week injectable testosterone cyprionate. He has no known drug allergies.  His past medical history demonstrates hypertension and hypercholesterolemia.  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  He does smoke and drinks only socially.  He has no known drug allergies.  His review of systems is non-contributory. His family history is not significant.\par

## 2020-10-30 NOTE — ASSESSMENT
[FreeTextEntry1] : The patient presents for a telehealth consultation to review his recent blood studies and decide what his dose of testosterone should be.  Patient has resumed injecting 100 mg of testosterone cypionate weekly. The patient reports that his erections and libido is still good. He has been using 100 mg of sildenafil. \par \par Blood studies from the emergency room visit showed a normal hematocrit.  However he has not repeated his testosterone since going back on the testosterone supplementation.  I have requested blood studies to be done over the next few days and we will review them by telehealth in 2 weeks.  I also renewed his sildenafil prescription 400 mg as needed.\par \par REF: [id number]\par \par Telehealth Consultation: 30 minutes  10 minutes reviewing his history and discussing prior results.  20 minutes discussing various treatment options, writing medication prescriptions (HCS), requests for lab testing and writing his note. There was also additional time in preparing for the visit and assisting the patient with technology issues he was having with the telehealth platform.

## 2020-11-05 ENCOUNTER — APPOINTMENT (OUTPATIENT)
Dept: FAMILY MEDICINE | Facility: CLINIC | Age: 50
End: 2020-11-05
Payer: COMMERCIAL

## 2020-11-05 VITALS
OXYGEN SATURATION: 98 % | TEMPERATURE: 97.9 F | HEIGHT: 67 IN | HEART RATE: 101 BPM | WEIGHT: 230 LBS | SYSTOLIC BLOOD PRESSURE: 137 MMHG | DIASTOLIC BLOOD PRESSURE: 84 MMHG | BODY MASS INDEX: 36.1 KG/M2

## 2020-11-05 PROCEDURE — 99214 OFFICE O/P EST MOD 30 MIN: CPT | Mod: 25

## 2020-11-05 PROCEDURE — 36415 COLL VENOUS BLD VENIPUNCTURE: CPT

## 2020-11-05 PROCEDURE — 99072 ADDL SUPL MATRL&STAF TM PHE: CPT

## 2020-11-05 NOTE — PHYSICAL EXAM
[Normal] : normal rate, regular rhythm, normal S1 and S2 and no murmur heard [de-identified] : Palpable epididymis but no testicular mass

## 2020-11-05 NOTE — HISTORY OF PRESENT ILLNESS
[Hyperlipidemia] : Hyperlipidemia [Other: ___] : [unfilled] [FreeTextEntry6] : pt is here for Med rx BW from Dr Quintana\par Also needs Lipid, B12, A1C, TFT's\par Right testicle feels like maybe a lump\par Doing well on ADD Meds. No side effects. No weight gain. No weight loss. No excessive jitteriness. No difficulties. Working on level of concentration\par

## 2020-11-05 NOTE — HEALTH RISK ASSESSMENT
[] : No [No] : No [Never (0 pts)] : Never (0 points) [No falls in past year] : Patient reported no falls in the past year [0] : 2) Feeling down, depressed, or hopeless: Not at all (0) [[id number]] : 0

## 2020-11-06 LAB
SARS-COV-2 IGG SERPL IA-ACNC: <3.8 AU/ML
SARS-COV-2 IGG SERPL QL IA: NEGATIVE

## 2020-11-09 ENCOUNTER — EMERGENCY (EMERGENCY)
Facility: HOSPITAL | Age: 50
LOS: 1 days | Discharge: HOME | End: 2020-11-09
Attending: STUDENT IN AN ORGANIZED HEALTH CARE EDUCATION/TRAINING PROGRAM
Payer: COMMERCIAL

## 2020-11-09 VITALS
OXYGEN SATURATION: 98 % | TEMPERATURE: 99 F | RESPIRATION RATE: 20 BRPM | HEART RATE: 74 BPM | SYSTOLIC BLOOD PRESSURE: 141 MMHG | DIASTOLIC BLOOD PRESSURE: 69 MMHG

## 2020-11-09 VITALS
HEART RATE: 81 BPM | TEMPERATURE: 98 F | HEIGHT: 68 IN | OXYGEN SATURATION: 98 % | RESPIRATION RATE: 20 BRPM | DIASTOLIC BLOOD PRESSURE: 84 MMHG | SYSTOLIC BLOOD PRESSURE: 137 MMHG | WEIGHT: 218.04 LBS

## 2020-11-09 DIAGNOSIS — Z96.7 PRESENCE OF OTHER BONE AND TENDON IMPLANTS: Chronic | ICD-10-CM

## 2020-11-09 LAB — SARS-COV-2 RNA SPEC QL NAA+PROBE: SIGNIFICANT CHANGE UP

## 2020-11-09 PROCEDURE — 99284 EMERGENCY DEPT VISIT MOD MDM: CPT

## 2020-11-09 PROCEDURE — 99283 EMERGENCY DEPT VISIT LOW MDM: CPT

## 2020-11-09 PROCEDURE — U0003: CPT

## 2020-11-09 NOTE — ED PROVIDER NOTE - NSFOLLOWUPINSTRUCTIONS_ED_ALL_ED_FT
1) Please follow up with your Primary Care Provider in 24-48 hours  2) Seek immediate medical care for any new or returning symptoms including but not limited severe pain, high fevers, difficulty breathing  3) Take Ibuprofen 400-600 mg every 4-6 hours as needed for pain. Do not take more than 1200 mg within a 24 hour period. Take this medication with food   4) You may use nasal decongestants as described on the bottle

## 2020-11-09 NOTE — ED ADULT NURSE NOTE - NSIMPLEMENTINTERV_GEN_ALL_ED
Implemented All Universal Safety Interventions:  [place] to call system. Call bell, personal items and telephone within reach. Instruct patient to call for assistance. Room bathroom lighting operational. Non-slip footwear when patient is off stretcher. Physically safe environment: no spills, clutter or unnecessary equipment. Stretcher in lowest position, wheels locked, appropriate side rails in place.

## 2020-11-09 NOTE — ED PROVIDER NOTE - OBJECTIVE STATEMENT
49 yo m pmh htn, hld, pw malaise and sinus pressure. pt reports yesterday awoke w/  sinus pressure, ha, rhinorrhea, and sore throat. 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 denies cough, sob, cp, f/c.

## 2020-11-09 NOTE — ED PROVIDER NOTE - PATIENT PORTAL LINK FT
You can access the FollowMyHealth Patient Portal offered by [facility] by registering at the following website: [email or web address]. By joining [employer]’s FollowMyHealth portal, you will also be able to view your health information using other applications (apps) compatible with our system.

## 2020-11-09 NOTE — ED ADULT NURSE NOTE - OBJECTIVE STATEMENT
51 y/o male coming in from home complaining of congestion. AOx4, ambulatory. 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 Denies fevers, cough. Spontaneous unlabored respirations. SpO2 98% on R/A. Possible exposure to COVID-19 due to work. Denies any other complaints. VSS. Will continue to reassess.

## 2020-11-09 NOTE — ED PROVIDER NOTE - NS ED ROS FT
GENERAL: No fever or chills, //             EYES: no change in vision, //             HEENT: huggins, sinusitis //             CARDIAC: no chest pain, //              PULMONARY: no cough or SOB, //             GI: no abdominal pain, no nausea or no vomiting, no diarrhea or constipation, //             : No changes in urination,  //            SKIN: no rashes,  //            NEURO: headache,  //             MSK: No joint pain otherwise as HPI or negative. ~Rishabh Day DO PGY3

## 2020-11-09 NOTE — ED PROVIDER NOTE - ATTENDING CONTRIBUTION TO CARE
I performed a history and physical exam of the patient and discussed their management with the resident. I reviewed the resident's note and agree with the documented findings and plan of care. My medical decision making and observations are found above.    50M hx htn, hld p/w cough/congestion/headache/sinus pain x2d. Patient works in an emergency room. Came in for covid swab/screen. Denies cp, sob, extreme fatigue, leg swelling, abd pain, no other complaints. On exam, patient is well appearing, nad aaox4. heent +mild frontal and maxillary ttp. no obvious purulent nasal discharge. Throat/neck WNL, FROM, moist mucous membranes. Heart rrr no m/r/g, 2+ pulses in distale xtremities b/l. Lungs ctabl, no resp distress. B/l lower extremities equal and without edema. abd soft, ntnt. likely c/w viral etiology. Will get covid swab, have patient f/u with pmd. discussed strict return precautions.

## 2020-11-09 NOTE — ED PROVIDER NOTE - CLINICAL SUMMARY MEDICAL DECISION MAKING FREE TEXT BOX
johann pgy3: 51 yo m pw malaise and sore throat x 1 day. sinus pressure and ha. likely viral syndrome and sinusitis. offered further w/u and eval however pt declining all interventions and testing except for covid as pt is employed at [employer] and requires negative swab for return to work. pt w/ good understanding of medical knowledge and demonstrates adequate decision making capacity and understands return precautions.

## 2020-11-09 NOTE — ED PROVIDER NOTE - PHYSICAL EXAMINATION
General: well appearing, interactive, well nourished, no apparent distress, ncat  HEENT: EOMI, PERRLA, normal mucosa, mild pharyngeal edema, no pharyngeal exudates, no uvular deviation  no lesions on the lips or on oral mucosa, normal external ear, ttp of b/l sinuses  Neck: supple, no lymphadenopathy, full range of motion, no nuchal rigidity  CV: RRR, normal S1 and S2 with no murmur, capillary refill less than two seconds  Resp: lungs CTA b/l, good aeration bilaterally, symmetric chest wall   Abd: non-distended, soft, non-tender  : no CVA tenderness  MSK: full range of motion, no cyanosis, no edema, no clubbing, no immobility  Neuro: CN II-XII grossly intact, muscle strength 5/5 in all extremities, normal gait  Skin: no rashes, skin intact

## 2020-11-11 LAB
25(OH)D3 SERPL-MCNC: 39.5 NG/ML
BASOPHILS # BLD AUTO: 0.05 K/UL
BASOPHILS NFR BLD AUTO: 0.7 %
EOSINOPHIL # BLD AUTO: 0.08 K/UL
EOSINOPHIL NFR BLD AUTO: 1.2 %
ESTRADIOL SERPL-MCNC: 40 PG/ML
HCT VFR BLD CALC: 55.3 %
HGB BLD-MCNC: 18 G/DL
IMM GRANULOCYTES NFR BLD AUTO: 0.3 %
LH SERPL-ACNC: 0.4 IU/L
LYMPHOCYTES # BLD AUTO: 2 K/UL
LYMPHOCYTES NFR BLD AUTO: 28.8 %
MAN DIFF?: NORMAL
MCHC RBC-ENTMCNC: 29.3 PG
MCHC RBC-ENTMCNC: 32.5 GM/DL
MCV RBC AUTO: 89.9 FL
MONOCYTES # BLD AUTO: 0.65 K/UL
MONOCYTES NFR BLD AUTO: 9.4 %
NEUTROPHILS # BLD AUTO: 4.14 K/UL
NEUTROPHILS NFR BLD AUTO: 59.6 %
PLATELET # BLD AUTO: 256 K/UL
PROLACTIN SERPL-MCNC: 8 NG/ML
PSA SERPL-MCNC: 1.18 NG/ML
RBC # BLD: 6.15 M/UL
RBC # FLD: 14 %
WBC # FLD AUTO: 6.94 K/UL

## 2020-11-19 ENCOUNTER — OUTPATIENT (OUTPATIENT)
Dept: OUTPATIENT SERVICES | Facility: HOSPITAL | Age: 50
LOS: 1 days | Discharge: HOME | End: 2020-11-19

## 2020-11-19 DIAGNOSIS — Z96.7 PRESENCE OF OTHER BONE AND TENDON IMPLANTS: Chronic | ICD-10-CM

## 2020-11-19 DIAGNOSIS — R71.0 PRECIPITOUS DROP IN HEMATOCRIT: ICD-10-CM

## 2020-11-24 ENCOUNTER — APPOINTMENT (OUTPATIENT)
Dept: HEMATOLOGY ONCOLOGY | Facility: CLINIC | Age: 50
End: 2020-11-24

## 2020-12-16 PROBLEM — Z12.11 ENCOUNTER FOR SCREENING COLONOSCOPY: Status: RESOLVED | Noted: 2018-06-12 | Resolved: 2020-12-16

## 2020-12-17 ENCOUNTER — APPOINTMENT (OUTPATIENT)
Dept: FAMILY MEDICINE | Facility: CLINIC | Age: 50
End: 2020-12-17

## 2020-12-17 ENCOUNTER — APPOINTMENT (OUTPATIENT)
Dept: HEMATOLOGY ONCOLOGY | Facility: CLINIC | Age: 50
End: 2020-12-17

## 2020-12-21 ENCOUNTER — RX RENEWAL (OUTPATIENT)
Age: 50
End: 2020-12-21

## 2020-12-24 ENCOUNTER — APPOINTMENT (OUTPATIENT)
Dept: FAMILY MEDICINE | Facility: CLINIC | Age: 50
End: 2020-12-24

## 2021-01-04 ENCOUNTER — NON-APPOINTMENT (OUTPATIENT)
Age: 51
End: 2021-01-04

## 2021-01-04 ENCOUNTER — APPOINTMENT (OUTPATIENT)
Dept: FAMILY MEDICINE | Facility: CLINIC | Age: 51
End: 2021-01-04
Payer: COMMERCIAL

## 2021-01-04 VITALS
TEMPERATURE: 98.2 F | HEART RATE: 96 BPM | BODY MASS INDEX: 35.79 KG/M2 | DIASTOLIC BLOOD PRESSURE: 80 MMHG | HEIGHT: 67 IN | SYSTOLIC BLOOD PRESSURE: 118 MMHG | WEIGHT: 228 LBS | OXYGEN SATURATION: 94 %

## 2021-01-04 VITALS — DIASTOLIC BLOOD PRESSURE: 86 MMHG | SYSTOLIC BLOOD PRESSURE: 124 MMHG

## 2021-01-04 DIAGNOSIS — Z00.00 ENCOUNTER FOR GENERAL ADULT MEDICAL EXAMINATION W/OUT ABNORMAL FINDINGS: ICD-10-CM

## 2021-01-04 LAB
BILIRUB UR QL STRIP: NORMAL
CLARITY UR: CLEAR
COLLECTION METHOD: NORMAL
GLUCOSE UR-MCNC: NORMAL
HCG UR QL: 0.2 EU/DL
HGB UR QL STRIP.AUTO: ABNORMAL
KETONES UR-MCNC: NORMAL
LEUKOCYTE ESTERASE UR QL STRIP: NORMAL
NITRITE UR QL STRIP: NORMAL
PH UR STRIP: 6.5
PROT UR STRIP-MCNC: NORMAL
SP GR UR STRIP: 1.01

## 2021-01-04 PROCEDURE — 99396 PREV VISIT EST AGE 40-64: CPT | Mod: 25

## 2021-01-04 PROCEDURE — 36415 COLL VENOUS BLD VENIPUNCTURE: CPT

## 2021-01-04 PROCEDURE — 99173 VISUAL ACUITY SCREEN: CPT

## 2021-01-04 PROCEDURE — 92551 PURE TONE HEARING TEST AIR: CPT

## 2021-01-04 PROCEDURE — 93000 ELECTROCARDIOGRAM COMPLETE: CPT

## 2021-01-04 PROCEDURE — 81003 URINALYSIS AUTO W/O SCOPE: CPT | Mod: QW

## 2021-01-04 PROCEDURE — 99072 ADDL SUPL MATRL&STAF TM PHE: CPT

## 2021-01-04 NOTE — HEALTH RISK ASSESSMENT
[Very Good] : ~his/her~  mood as very good [] : No [No] : No [No falls in past year] : Patient reported no falls in the past year [0] : 2) Feeling down, depressed, or hopeless: Not at all (0) [Audit-CScore] : 0 [QEQ2Aybvy] : 0 [HIV Test offered] : HIV Test offered [Hepatitis C test offered] : Hepatitis C test offered [Change in mental status noted] : No change in mental status noted [Language] : denies difficulty with language [Behavior] : denies difficulty with behavior [Learning/Retaining New Information] : denies difficulty learning/retaining new information [Handling Complex Tasks] : denies difficulty handling complex tasks [Reasoning] : denies difficulty with reasoning [Spatial Ability and Orientation] : denies difficulty with spatial ability and orientation [None] : None [With Significant Other] : lives with significant other [Employed] : employed [High School] : high school [] :  [Significant Other] : lives with significant other [# Of Children ___] : has [unfilled] children [Sexually Active] : sexually active [High Risk Behavior] : no high risk behavior [Feels Safe at Home] : Feels safe at home [Fully functional (bathing, dressing, toileting, transferring, walking, feeding)] : Fully functional (bathing, dressing, toileting, transferring, walking, feeding) [Fully functional (using the telephone, shopping, preparing meals, housekeeping, doing laundry, using] : Fully functional and needs no help or supervision to perform IADLs (using the telephone, shopping, preparing meals, housekeeping, doing laundry, using transportation, managing medications and managing finances) [Reports changes in hearing] : Reports no changes in hearing [Reports changes in vision] : Reports no changes in vision [Reports changes in dental health] : Reports no changes in dental health [Smoke Detector] : smoke detector [Carbon Monoxide Detector] : carbon monoxide detector [Guns at Home] : no guns at home [Seat Belt] :  uses seat belt [Sunscreen] : uses sunscreen

## 2021-01-04 NOTE — PHYSICAL EXAM
[20/___] : left eye 20/[unfilled] [No Acute Distress] : no acute distress [Well Nourished] : well nourished [Well Developed] : well developed [Well-Appearing] : well-appearing [Normal Sclera/Conjunctiva] : normal sclera/conjunctiva [PERRL] : pupils equal round and reactive to light [EOMI] : extraocular movements intact [Normal Outer Ear/Nose] : the outer ears and nose were normal in appearance [Normal Oropharynx] : the oropharynx was normal [No JVD] : no jugular venous distention [No Lymphadenopathy] : no lymphadenopathy [Supple] : supple [Thyroid Normal, No Nodules] : the thyroid was normal and there were no nodules present [No Respiratory Distress] : no respiratory distress  [No Accessory Muscle Use] : no accessory muscle use [Clear to Auscultation] : lungs were clear to auscultation bilaterally [Normal Rate] : normal rate  [Regular Rhythm] : with a regular rhythm [Normal S1, S2] : normal S1 and S2 [No Murmur] : no murmur heard [No Carotid Bruits] : no carotid bruits [No Abdominal Bruit] : a ~M bruit was not heard ~T in the abdomen [No Varicosities] : no varicosities [Pedal Pulses Present] : the pedal pulses are present [No Edema] : there was no peripheral edema [No Palpable Aorta] : no palpable aorta [No Extremity Clubbing/Cyanosis] : no extremity clubbing/cyanosis [Soft] : abdomen soft [Non Tender] : non-tender [Non-distended] : non-distended [No Masses] : no abdominal mass palpated [No HSM] : no HSM [Normal Bowel Sounds] : normal bowel sounds [Normal Posterior Cervical Nodes] : no posterior cervical lymphadenopathy [Normal Anterior Cervical Nodes] : no anterior cervical lymphadenopathy [No CVA Tenderness] : no CVA  tenderness [No Spinal Tenderness] : no spinal tenderness [No Joint Swelling] : no joint swelling [Grossly Normal Strength/Tone] : grossly normal strength/tone [No Rash] : no rash [Coordination Grossly Intact] : coordination grossly intact [No Focal Deficits] : no focal deficits [Normal Gait] : normal gait [Normal Affect] : the affect was normal [Normal Insight/Judgement] : insight and judgment were intact [FreeTextEntry1] : Right Ear\par \par 0.5-40\par 1-30\par 2-20\par 4-35\par \par Left Ear\par \par 0.5-30\par 1-25\par 2-20\par 4-20

## 2021-01-04 NOTE — HEALTH RISK ASSESSMENT
[Very Good] : ~his/her~  mood as very good [] : No [No] : No [No falls in past year] : Patient reported no falls in the past year [0] : 2) Feeling down, depressed, or hopeless: Not at all (0) [Audit-CScore] : 0 [[id number]] : 0 [HIV Test offered] : HIV Test offered [Hepatitis C test offered] : Hepatitis C test offered [Change in mental status noted] : No change in mental status noted [Language] : denies difficulty with language [Behavior] : denies difficulty with behavior [Learning/Retaining New Information] : denies difficulty learning/retaining new information [Handling Complex Tasks] : denies difficulty handling complex tasks [Reasoning] : denies difficulty with reasoning [Spatial Ability and Orientation] : denies difficulty with spatial ability and orientation [None] : None [With Significant Other] : lives with significant other [Employed] : employed [High School] : high school [] :  [Significant Other] : lives with significant other [# Of Children ___] : has [unfilled] children [Sexually Active] : sexually active [High Risk Behavior] : no high risk behavior [Feels Safe at Home] : Feels safe at home [Fully functional (bathing, dressing, toileting, transferring, walking, feeding)] : Fully functional (bathing, dressing, toileting, transferring, walking, feeding) [Fully functional (using the telephone, shopping, preparing meals, housekeeping, doing laundry, using] : Fully functional and needs no help or supervision to perform IADLs (using the telephone, shopping, preparing meals, housekeeping, doing laundry, using transportation, managing medications and managing finances) [Reports changes in hearing] : Reports no changes in hearing [Reports changes in vision] : Reports no changes in vision [Reports changes in dental health] : Reports no changes in dental health [Smoke Detector] : smoke detector [Carbon Monoxide Detector] : carbon monoxide detector [Guns at Home] : no guns at home [Seat Belt] :  uses seat belt [Sunscreen] : uses sunscreen

## 2021-01-05 ENCOUNTER — NON-APPOINTMENT (OUTPATIENT)
Age: 51
End: 2021-01-05

## 2021-01-05 LAB
ALBUMIN SERPL ELPH-MCNC: 4.8 G/DL
ALP BLD-CCNC: 79 U/L
ALT SERPL-CCNC: 58 U/L
ANION GAP SERPL CALC-SCNC: 14 MMOL/L
AST SERPL-CCNC: 34 U/L
BASOPHILS # BLD AUTO: 0.08 K/UL
BASOPHILS NFR BLD AUTO: 0.9 %
BILIRUB SERPL-MCNC: 0.6 MG/DL
BUN SERPL-MCNC: 14 MG/DL
CALCIUM SERPL-MCNC: 10 MG/DL
CHLORIDE SERPL-SCNC: 98 MMOL/L
CHOLEST SERPL-MCNC: 139 MG/DL
CO2 SERPL-SCNC: 25 MMOL/L
CREAT SERPL-MCNC: 1.31 MG/DL
EOSINOPHIL # BLD AUTO: 0.15 K/UL
EOSINOPHIL NFR BLD AUTO: 1.7 %
ESTIMATED AVERAGE GLUCOSE: 117 MG/DL
FOLATE SERPL-MCNC: 8.1 NG/ML
GLUCOSE SERPL-MCNC: 103 MG/DL
HBA1C MFR BLD HPLC: 5.7 %
HCT VFR BLD CALC: 53.6 %
HDLC SERPL-MCNC: 33 MG/DL
HGB BLD-MCNC: 18 G/DL
IMM GRANULOCYTES NFR BLD AUTO: 0.3 %
LDLC SERPL CALC-MCNC: 67 MG/DL
LYMPHOCYTES # BLD AUTO: 2.34 K/UL
LYMPHOCYTES NFR BLD AUTO: 26.4 %
MAN DIFF?: NORMAL
MCHC RBC-ENTMCNC: 29.4 PG
MCHC RBC-ENTMCNC: 33.6 GM/DL
MCV RBC AUTO: 87.6 FL
MONOCYTES # BLD AUTO: 0.79 K/UL
MONOCYTES NFR BLD AUTO: 8.9 %
NEUTROPHILS # BLD AUTO: 5.48 K/UL
NEUTROPHILS NFR BLD AUTO: 61.8 %
NONHDLC SERPL-MCNC: 106 MG/DL
PLATELET # BLD AUTO: 248 K/UL
POTASSIUM SERPL-SCNC: 3.8 MMOL/L
PROT SERPL-MCNC: 7 G/DL
RBC # BLD: 6.12 M/UL
RBC # FLD: 12.7 %
SODIUM SERPL-SCNC: 137 MMOL/L
T3FREE SERPL-MCNC: 3.74 PG/ML
T4 FREE SERPL-MCNC: 1.4 NG/DL
TRIGL SERPL-MCNC: 192 MG/DL
TSH SERPL-ACNC: 2.33 UIU/ML
VIT B12 SERPL-MCNC: 622 PG/ML
WBC # FLD AUTO: 8.87 K/UL

## 2021-01-09 LAB
TESTOST BND SERPL-MCNC: 13.4 PG/ML
TESTOST SERPL-MCNC: 441.5 NG/DL

## 2021-02-04 ENCOUNTER — APPOINTMENT (OUTPATIENT)
Dept: CARDIOLOGY | Facility: CLINIC | Age: 51
End: 2021-02-04

## 2021-02-27 ENCOUNTER — TRANSCRIPTION ENCOUNTER (OUTPATIENT)
Age: 51
End: 2021-02-27

## 2021-03-04 ENCOUNTER — APPOINTMENT (OUTPATIENT)
Dept: FAMILY MEDICINE | Facility: CLINIC | Age: 51
End: 2021-03-04

## 2021-03-11 ENCOUNTER — APPOINTMENT (OUTPATIENT)
Dept: FAMILY MEDICINE | Facility: CLINIC | Age: 51
End: 2021-03-11
Payer: COMMERCIAL

## 2021-03-11 VITALS
OXYGEN SATURATION: 97 % | HEART RATE: 82 BPM | HEIGHT: 67 IN | SYSTOLIC BLOOD PRESSURE: 143 MMHG | DIASTOLIC BLOOD PRESSURE: 85 MMHG | TEMPERATURE: 98 F | BODY MASS INDEX: 35.79 KG/M2 | WEIGHT: 228 LBS

## 2021-03-11 LAB
BILIRUB UR QL STRIP: NORMAL
CLARITY UR: CLEAR
COLLECTION METHOD: NORMAL
GLUCOSE UR-MCNC: NORMAL
HCG UR QL: 0.2 EU/DL
HGB UR QL STRIP.AUTO: NORMAL
KETONES UR-MCNC: NORMAL
LEUKOCYTE ESTERASE UR QL STRIP: NORMAL
NITRITE UR QL STRIP: NORMAL
PH UR STRIP: 7
PROT UR STRIP-MCNC: NORMAL
SP GR UR STRIP: 1.01

## 2021-03-11 PROCEDURE — 99214 OFFICE O/P EST MOD 30 MIN: CPT | Mod: 25

## 2021-03-11 PROCEDURE — 36415 COLL VENOUS BLD VENIPUNCTURE: CPT

## 2021-03-11 PROCEDURE — 81003 URINALYSIS AUTO W/O SCOPE: CPT | Mod: QW

## 2021-03-11 PROCEDURE — 99072 ADDL SUPL MATRL&STAF TM PHE: CPT

## 2021-03-11 NOTE — HEALTH RISK ASSESSMENT
[No] : In the past 12 months have you used drugs other than those required for medical reasons? No [No falls in past year] : Patient reported no falls in the past year [0] : 2) Feeling down, depressed, or hopeless: Not at all (0) [] : No [Audit-CScore] : 0 [[id number]] : 0

## 2021-03-11 NOTE — HISTORY OF PRESENT ILLNESS
[Hyperlipidemia] : Hyperlipidemia [Hypertension] : Hypertension [Other: ___] : [unfilled]: [Checks BP Sporadically] : The patient checks ~his/her~ blood pressure sporadically [<140/90] : Target blood pressure is <140/90 [Near target goal] : BP near target goal [Doing Well] : Patient is doing well [Managed with medications] : managed with  medication [Moderate Intensity Therapy] : Patient is currently on moderate intensity statin  therapy [FreeTextEntry6] : Pt is here for medication rx  and fasting blood work. Has been able to manage glucose but still occasionally goes up

## 2021-03-12 LAB
ALBUMIN SERPL ELPH-MCNC: 4.7 G/DL
ALP BLD-CCNC: 81 U/L
ALT SERPL-CCNC: 51 U/L
ANION GAP SERPL CALC-SCNC: 13 MMOL/L
AST SERPL-CCNC: 36 U/L
BASOPHILS # BLD AUTO: 0.04 K/UL
BASOPHILS NFR BLD AUTO: 0.8 %
BILIRUB SERPL-MCNC: 0.4 MG/DL
BUN SERPL-MCNC: 24 MG/DL
CALCIUM SERPL-MCNC: 9.6 MG/DL
CHLORIDE SERPL-SCNC: 103 MMOL/L
CHOLEST SERPL-MCNC: 166 MG/DL
CO2 SERPL-SCNC: 25 MMOL/L
CREAT SERPL-MCNC: 1.25 MG/DL
EOSINOPHIL # BLD AUTO: 0.06 K/UL
EOSINOPHIL NFR BLD AUTO: 1.2 %
ESTIMATED AVERAGE GLUCOSE: 114 MG/DL
GLUCOSE SERPL-MCNC: 98 MG/DL
HBA1C MFR BLD HPLC: 5.6 %
HCT VFR BLD CALC: 47.2 %
HDLC SERPL-MCNC: 42 MG/DL
HGB BLD-MCNC: 15.4 G/DL
IMM GRANULOCYTES NFR BLD AUTO: 0.2 %
LDLC SERPL CALC-MCNC: 102 MG/DL
LYMPHOCYTES # BLD AUTO: 1.28 K/UL
LYMPHOCYTES NFR BLD AUTO: 25.7 %
MAN DIFF?: NORMAL
MCHC RBC-ENTMCNC: 29.7 PG
MCHC RBC-ENTMCNC: 32.6 GM/DL
MCV RBC AUTO: 91.1 FL
MONOCYTES # BLD AUTO: 0.52 K/UL
MONOCYTES NFR BLD AUTO: 10.4 %
NEUTROPHILS # BLD AUTO: 3.08 K/UL
NEUTROPHILS NFR BLD AUTO: 61.7 %
NONHDLC SERPL-MCNC: 124 MG/DL
PLATELET # BLD AUTO: 235 K/UL
POTASSIUM SERPL-SCNC: 4.5 MMOL/L
PROT SERPL-MCNC: 7 G/DL
RBC # BLD: 5.18 M/UL
RBC # FLD: 13.8 %
RHEUMATOID FACT SER QL: <10 IU/ML
SODIUM SERPL-SCNC: 141 MMOL/L
TRIGL SERPL-MCNC: 110 MG/DL
WBC # FLD AUTO: 4.99 K/UL

## 2021-03-18 ENCOUNTER — TRANSCRIPTION ENCOUNTER (OUTPATIENT)
Age: 51
End: 2021-03-18

## 2021-03-18 LAB
TESTOST BND SERPL-MCNC: 3.7 PG/ML
TESTOST SERPL-MCNC: 132.2 NG/DL

## 2021-03-19 ENCOUNTER — TRANSCRIPTION ENCOUNTER (OUTPATIENT)
Age: 51
End: 2021-03-19

## 2021-04-02 ENCOUNTER — RX RENEWAL (OUTPATIENT)
Age: 51
End: 2021-04-02

## 2021-04-14 ENCOUNTER — APPOINTMENT (OUTPATIENT)
Dept: UROLOGY | Facility: CLINIC | Age: 51
End: 2021-04-14
Payer: COMMERCIAL

## 2021-04-14 PROCEDURE — 99214 OFFICE O/P EST MOD 30 MIN: CPT | Mod: 95

## 2021-04-14 NOTE — ASSESSMENT
[FreeTextEntry1] : The patient presents for a telehealth consultation to review his recent blood studies and decide what his dose of testosterone should be.  The patient has not yet resumed his testosterone. Blood test demonstrated a hematocrit of 47.2%. Hemoglobin A1c C was 5.6%.  His testosterone both free and total was slightly low at 3.7 pg/mL for free testosterone and 132.2 ng/dL for total testosterone.  I have suggested resuming 100 mg 1 week and 75 mg on the alternate week.  I will then obtain blood studies in 3 to 4 weeks and we will discuss the results in 4 to 6 weeks to determine what his dose should be.  I renewed his testosterone prescription and sildenafil.  We also renewed syringes.\par \par REF: [id number]\par \par Telehealth Consultation: 30 minutes  10 minutes reviewing his history and discussing prior results.  20 minutes discussing various treatment options, writing medication prescriptions (HCS), requests for lab testing and writing his note. There was also additional time in preparing for the visit and assisting the patient with technology issues he was having with the telehealth platform.

## 2021-04-14 NOTE — HISTORY OF PRESENT ILLNESS
[Home] : at home, [unfilled] , at the time of the visit. [Medical Office: ([facility])___] : at the medical office located in  [Verbal consent obtained from patient] : the patient, [unfilled] [FreeTextEntry1] : The patient-doctor. relationship has been established in a face-to-face fashion on real-time video audio HIPAA compliant communication using telemedicine software.  The patient's identity has been confirmed.  The patient was previously emailed a copy of the telemedicine consent.  The patient has had a chance to review and has now given verbal consent and has requested care to be assessed and treated through telemedicine. The patient understands there may be limitations in this process and that they need not need further follow-up care in the office and/or hospital settings.  We were unable to use the [employer] platform and an alternative platform was utilized.\par \par Verbal consent was given on Wednesday, Ayanna 10, 2020 at 1:45 PM by the patient.  It was requested by the physician.  A written consent was previously sent for the patient to sign and return.\par \par The patient presents for a telehealth consultation to review his recent blood studies.  He has not resumed injectable testosterone as of yet.  He was told by his endocrinologist that his testosterone is low.. The patient reports that his erections and libido is still good. He has been using 100 mg of sildenafil. \par \par PMH: Patient initially presented with the chief complaint of testosterone for evaluation. From the age of [age] the patient had been abusing androgens. He then had 2 children with her partner who he subsequently . For the past year and a half he's been placed on testosterone and is taking 150 mg a week injectable testosterone cyprionate. He has no known drug allergies.  His past medical history demonstrates hypertension and hypercholesterolemia.  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  He does smoke and drinks only socially.  He has no known drug allergies.  His review of systems is non-contributory. His family history is not significant.\par

## 2021-04-30 ENCOUNTER — TRANSCRIPTION ENCOUNTER (OUTPATIENT)
Age: 51
End: 2021-04-30

## 2021-05-02 ENCOUNTER — RX RENEWAL (OUTPATIENT)
Age: 51
End: 2021-05-02

## 2021-05-05 ENCOUNTER — NON-APPOINTMENT (OUTPATIENT)
Age: 51
End: 2021-05-05

## 2021-05-06 ENCOUNTER — NON-APPOINTMENT (OUTPATIENT)
Age: 51
End: 2021-05-06

## 2021-05-06 ENCOUNTER — APPOINTMENT (OUTPATIENT)
Dept: PSYCHIATRY | Facility: CLINIC | Age: 51
End: 2021-05-06

## 2021-05-07 ENCOUNTER — NON-APPOINTMENT (OUTPATIENT)
Age: 51
End: 2021-05-07

## 2021-05-08 ENCOUNTER — APPOINTMENT (OUTPATIENT)
Dept: FAMILY MEDICINE | Facility: CLINIC | Age: 51
End: 2021-05-08
Payer: COMMERCIAL

## 2021-05-08 VITALS
SYSTOLIC BLOOD PRESSURE: 150 MMHG | HEART RATE: 117 BPM | DIASTOLIC BLOOD PRESSURE: 87 MMHG | HEIGHT: 67 IN | OXYGEN SATURATION: 97 % | TEMPERATURE: 100.7 F | BODY MASS INDEX: 35.79 KG/M2 | WEIGHT: 228 LBS

## 2021-05-08 VITALS — TEMPERATURE: 98.4 F

## 2021-05-08 LAB
BASOPHILS # BLD AUTO: 0.06 K/UL
BASOPHILS NFR BLD AUTO: 0.7 %
EOSINOPHIL # BLD AUTO: 0.15 K/UL
EOSINOPHIL NFR BLD AUTO: 1.8 %
HCT VFR BLD CALC: 50 %
HGB BLD-MCNC: 16.7 G/DL
IMM GRANULOCYTES NFR BLD AUTO: 0.5 %
LYMPHOCYTES # BLD AUTO: 2.14 K/UL
LYMPHOCYTES NFR BLD AUTO: 26 %
MAN DIFF?: NORMAL
MCHC RBC-ENTMCNC: 29.7 PG
MCHC RBC-ENTMCNC: 33.4 GM/DL
MCV RBC AUTO: 89 FL
MONOCYTES # BLD AUTO: 0.74 K/UL
MONOCYTES NFR BLD AUTO: 9 %
NEUTROPHILS # BLD AUTO: 5.09 K/UL
NEUTROPHILS NFR BLD AUTO: 62 %
PLATELET # BLD AUTO: 228 K/UL
RBC # BLD: 5.62 M/UL
RBC # FLD: 13 %
WBC # FLD AUTO: 8.22 K/UL

## 2021-05-08 PROCEDURE — 36415 COLL VENOUS BLD VENIPUNCTURE: CPT

## 2021-05-08 PROCEDURE — 99072 ADDL SUPL MATRL&STAF TM PHE: CPT

## 2021-05-08 PROCEDURE — 99214 OFFICE O/P EST MOD 30 MIN: CPT | Mod: 25

## 2021-05-08 RX ORDER — ESZOPICLONE 2 MG/1
2 TABLET, FILM COATED ORAL
Qty: 30 | Refills: 1 | Status: DISCONTINUED | COMMUNITY
Start: 2021-03-11 | End: 2021-05-08
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
## 2021-05-10 LAB
ALBUMIN SERPL ELPH-MCNC: 5 G/DL
ALP BLD-CCNC: 79 U/L
ALT SERPL-CCNC: 36 U/L
ANION GAP SERPL CALC-SCNC: 16 MMOL/L
AST SERPL-CCNC: 24 U/L
BILIRUB SERPL-MCNC: 0.3 MG/DL
BUN SERPL-MCNC: 18 MG/DL
CALCIUM SERPL-MCNC: 10.6 MG/DL
CHLORIDE SERPL-SCNC: 101 MMOL/L
CO2 SERPL-SCNC: 22 MMOL/L
CREAT SERPL-MCNC: 1.27 MG/DL
ESTIMATED AVERAGE GLUCOSE: 105 MG/DL
GLUCOSE SERPL-MCNC: 100 MG/DL
HBA1C MFR BLD HPLC: 5.3 %
POTASSIUM SERPL-SCNC: 3.8 MMOL/L
PROT SERPL-MCNC: 6.9 G/DL
SODIUM SERPL-SCNC: 138 MMOL/L
T3FREE SERPL-MCNC: 3.18 PG/ML
T4 FREE SERPL-MCNC: 1.4 NG/DL
TSH SERPL-ACNC: 3.2 UIU/ML

## 2021-05-12 ENCOUNTER — APPOINTMENT (OUTPATIENT)
Dept: PSYCHIATRY | Facility: CLINIC | Age: 51
End: 2021-05-12
Payer: COMMERCIAL

## 2021-05-12 LAB
TESTOST BND SERPL-MCNC: 14.2 PG/ML
TESTOST SERPL-MCNC: 487.2 NG/DL

## 2021-05-12 PROCEDURE — 90791 PSYCH DIAGNOSTIC EVALUATION: CPT | Mod: 95

## 2021-05-13 ENCOUNTER — TRANSCRIPTION ENCOUNTER (OUTPATIENT)
Age: 51
End: 2021-05-13

## 2021-05-13 ENCOUNTER — OUTPATIENT (OUTPATIENT)
Dept: OUTPATIENT SERVICES | Facility: HOSPITAL | Age: 51
LOS: 1 days | End: 2021-05-13
Payer: COMMERCIAL

## 2021-05-13 DIAGNOSIS — G47.33 OBSTRUCTIVE SLEEP APNEA (ADULT) (PEDIATRIC): ICD-10-CM

## 2021-05-13 DIAGNOSIS — Z96.7 PRESENCE OF OTHER BONE AND TENDON IMPLANTS: Chronic | ICD-10-CM

## 2021-05-13 PROCEDURE — 95806 SLEEP STUDY UNATT&RESP EFFT: CPT

## 2021-05-13 PROCEDURE — 95806 SLEEP STUDY UNATT&RESP EFFT: CPT | Mod: 26

## 2021-05-14 ENCOUNTER — APPOINTMENT (OUTPATIENT)
Dept: CARDIOLOGY | Facility: CLINIC | Age: 51
End: 2021-05-14
Payer: COMMERCIAL

## 2021-05-14 VITALS
WEIGHT: 228 LBS | TEMPERATURE: 97.6 F | BODY MASS INDEX: 35.79 KG/M2 | SYSTOLIC BLOOD PRESSURE: 110 MMHG | HEART RATE: 87 BPM | RESPIRATION RATE: 16 BRPM | OXYGEN SATURATION: 98 % | HEIGHT: 67 IN | DIASTOLIC BLOOD PRESSURE: 78 MMHG

## 2021-05-14 PROCEDURE — 93000 ELECTROCARDIOGRAM COMPLETE: CPT

## 2021-05-14 PROCEDURE — 99072 ADDL SUPL MATRL&STAF TM PHE: CPT

## 2021-05-14 PROCEDURE — 99214 OFFICE O/P EST MOD 30 MIN: CPT

## 2021-05-14 NOTE — REASON FOR VISIT
[FreeTextEntry1] : This is a 50 year old patient returning for cardiac reevaluation.\par \par His concerns include:\par 1. ECG abnormalities.\par 2. A strong family history for premature CAD\par 3. History of hyperlipidemia\par 4. A prior history of hypertension\par 5. Prior history of obesity.\par 6.  Mild LAD and RCA atherosclerosis\par

## 2021-05-14 NOTE — HISTORY OF PRESENT ILLNESS
[FreeTextEntry1] : Anxious about his nonobstructive coronary disease. We have had several conversations about his  coronary  CTA of  2020 which revealed 25-49% stenosis of the prox LAD and prox. RCA with  less then 25%, otherwise normal study.\par \par Denies any associated symptoms but is very concerned because there is a family history of cancer and CAD\par \par Previous work up through  Dr. Christine/ cardiology of [place] and had a nuclear stress test , echo and carotid.  By his report has a small " whole in his heart". Results not available, made several attempts to obtain records which have been unsuccessful. \par \par Had a calcium scoring done about 6+ years ago. Those results were not available.\par \par Having trouble loosing weight believes this is due to + covid hx. Blood work will be reviewed today.\par Sleep study pending through Dr. Jean. \par \par On Amphetamine-Dextroamphetamine for ADHD\par Uses no alcohol or caffeine-containing beverages.\par Palps have not been a recent issue. \par \par Continues to work out 5 days a week using both resistance and aerobic exercise without difficulty or limitation.\par No CP during his workouts\par \par HX of AC therapy / Eliquis for a DVT after breaking his ankle.\par \par \par \par \par \par \par \par

## 2021-05-14 NOTE — ASSESSMENT
[FreeTextEntry1] : ECG- SR at 87 bpm LAD No ST Or T wave abn\par \par Lab data 5/8/21 \par Chol. 125\par HDL 29\par LDL 27\par Tri. 344\par HGB 16.7\par A1C 5.3\par Creat. 1.27\par \par Lab data \par   9/29/20\par TC      135\par LDL      91\par HDL     34\par Tri.      96\par Creat. 1.25\par HGB 16.9\par \par CT angiogram 9/29/2020:\par Left main:  no plaquing\par LAD:          25 to 49% mild mid vessel atherosclerotic plaquing\par Circumflex: no plaque\par Ramus:       no plaque\par RCA :          mild proximal  < 25% proximal RCA plaque\par Calcium score 179\par \par Echocardiogram 7/18/18:  Mild concentric hypertrophy with overall normal left ventricular systolic function.  LVEF 65%.  Focal aortic sclerosis.  Otherwise unremarkable echocardiogram. \par \par Carotid Doppler 7/18/18 showed no intimal thickening or plaquing.  Study is within normal limits.  \par \par Exercise stress testing 7/2/18:  Patient exercised on a standard Delta protocol for a total of 12 minutes and 15 seconds with a peak heart rate of 155 (90% maximally predicted).  Normal blood pressure response.  No significant symptoms.  Rare apices.  No ischemic ECG changes.   \par \par The exercise stress test shows excellent exercise tolerance without any exercise induced ischemia, arrhythmia, or hypertension.  \par \par Impression:  \par 1.  He has evidence of mild atherosclerotic disease of the LAD and RCA \par      CTA of coronaries with mild nonobstructive disease to the LAD \par     - ECG today with no ischemia changes\par     - Continues to exercise aggressively with out any exertional discomfort. \par \par 2.  HX of focal chest pain which has not recurred. \par \par 3.   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 \par \par 4.  He does have mild concentric hypertrophy seen on last echo done in our practice. \par      There is an outside echocardiogram performed that is not available to review\par \par 5.  The patient has a history of hypertension controlled on Losartan-HCTZ.\par \par 6.  Lipid panel with elevated Triglycerides related to diet which includes Italian favorites weekly.  Repeat ordered          through is PCP.\par \par 7. There is a very strong Fam Hx including  Father - CABG at 48 y/o and Brother stents x 3 at 44 y/o.\par \par \par \par Plan\par 1. Goal of therapy for  LDL <70 with new diagnosis of  nonobstructive CAD.   Repeat BW through PCP\par \par 2. Understands that importance of diet modification with his CRFs.  Specially triglycerides which have been labile in response to dietary indiscretions.  Omega-3 fatty acids could be titrated upwards\par \par 3. No changes to his meds\par \par 4.. Avoid caffeine, ETOH and pre-work out\par \par 5. Will discuss possibly considering  noninvasive testing during his next OV. \par \par Clinical follow-up in 6 months

## 2021-05-17 ENCOUNTER — APPOINTMENT (OUTPATIENT)
Dept: PSYCHIATRY | Facility: CLINIC | Age: 51
End: 2021-05-17
Payer: COMMERCIAL

## 2021-05-17 PROCEDURE — 90837 PSYTX W PT 60 MINUTES: CPT | Mod: 95

## 2021-05-18 ENCOUNTER — TRANSCRIPTION ENCOUNTER (OUTPATIENT)
Age: 51
End: 2021-05-18

## 2021-05-18 ENCOUNTER — NON-APPOINTMENT (OUTPATIENT)
Age: 51
End: 2021-05-18

## 2021-05-18 LAB
CHOLEST SERPL-MCNC: 125 MG/DL
HDLC SERPL-MCNC: 29 MG/DL
LDLC SERPL CALC-MCNC: 27 MG/DL
NONHDLC SERPL-MCNC: 96 MG/DL
TRIGL SERPL-MCNC: 344 MG/DL

## 2021-05-19 ENCOUNTER — TRANSCRIPTION ENCOUNTER (OUTPATIENT)
Age: 51
End: 2021-05-19

## 2021-05-20 ENCOUNTER — TRANSCRIPTION ENCOUNTER (OUTPATIENT)
Age: 51
End: 2021-05-20

## 2021-05-21 ENCOUNTER — TRANSCRIPTION ENCOUNTER (OUTPATIENT)
Age: 51
End: 2021-05-21

## 2021-05-24 ENCOUNTER — NON-APPOINTMENT (OUTPATIENT)
Age: 51
End: 2021-05-24

## 2021-05-24 ENCOUNTER — APPOINTMENT (OUTPATIENT)
Dept: PSYCHIATRY | Facility: CLINIC | Age: 51
End: 2021-05-24

## 2021-05-26 ENCOUNTER — APPOINTMENT (OUTPATIENT)
Dept: PSYCHIATRY | Facility: CLINIC | Age: 51
End: 2021-05-26

## 2021-05-26 ENCOUNTER — NON-APPOINTMENT (OUTPATIENT)
Age: 51
End: 2021-05-26

## 2021-05-28 ENCOUNTER — APPOINTMENT (OUTPATIENT)
Age: 51
End: 2021-05-28
Payer: COMMERCIAL

## 2021-05-28 ENCOUNTER — TRANSCRIPTION ENCOUNTER (OUTPATIENT)
Age: 51
End: 2021-05-28

## 2021-05-28 DIAGNOSIS — F43.0 ACUTE STRESS REACTION: ICD-10-CM

## 2021-05-28 PROCEDURE — 90837 PSYTX W PT 60 MINUTES: CPT | Mod: 95

## 2021-06-01 ENCOUNTER — NON-APPOINTMENT (OUTPATIENT)
Age: 51
End: 2021-06-01

## 2021-06-01 ENCOUNTER — APPOINTMENT (OUTPATIENT)
Dept: PSYCHIATRY | Facility: CLINIC | Age: 51
End: 2021-06-01

## 2021-06-03 ENCOUNTER — APPOINTMENT (OUTPATIENT)
Dept: FAMILY MEDICINE | Facility: CLINIC | Age: 51
End: 2021-06-03

## 2021-06-04 ENCOUNTER — NON-APPOINTMENT (OUTPATIENT)
Age: 51
End: 2021-06-04

## 2021-06-04 ENCOUNTER — APPOINTMENT (OUTPATIENT)
Dept: PSYCHIATRY | Facility: CLINIC | Age: 51
End: 2021-06-04
Payer: COMMERCIAL

## 2021-06-04 PROCEDURE — 90837 PSYTX W PT 60 MINUTES: CPT | Mod: 95

## 2021-06-09 ENCOUNTER — APPOINTMENT (OUTPATIENT)
Dept: BARIATRICS/WEIGHT MGMT | Facility: CLINIC | Age: 51
End: 2021-06-09
Payer: COMMERCIAL

## 2021-06-09 PROCEDURE — 97802 MEDICAL NUTRITION INDIV IN: CPT | Mod: 95

## 2021-06-10 VITALS — BODY MASS INDEX: 35.79 KG/M2 | WEIGHT: 228 LBS | HEIGHT: 67 IN

## 2021-06-11 ENCOUNTER — RX RENEWAL (OUTPATIENT)
Age: 51
End: 2021-06-11

## 2021-06-11 ENCOUNTER — APPOINTMENT (OUTPATIENT)
Dept: PSYCHIATRY | Facility: CLINIC | Age: 51
End: 2021-06-11
Payer: COMMERCIAL

## 2021-06-11 PROCEDURE — 90832 PSYTX W PT 30 MINUTES: CPT | Mod: 95

## 2021-06-12 ENCOUNTER — TRANSCRIPTION ENCOUNTER (OUTPATIENT)
Age: 51
End: 2021-06-12

## 2021-06-14 ENCOUNTER — NON-APPOINTMENT (OUTPATIENT)
Age: 51
End: 2021-06-14

## 2021-06-17 ENCOUNTER — NON-APPOINTMENT (OUTPATIENT)
Age: 51
End: 2021-06-17

## 2021-06-21 ENCOUNTER — APPOINTMENT (OUTPATIENT)
Dept: BARIATRICS/WEIGHT MGMT | Facility: CLINIC | Age: 51
End: 2021-06-21

## 2021-06-23 ENCOUNTER — APPOINTMENT (OUTPATIENT)
Dept: PSYCHIATRY | Facility: CLINIC | Age: 51
End: 2021-06-23
Payer: COMMERCIAL

## 2021-06-23 PROCEDURE — 90837 PSYTX W PT 60 MINUTES: CPT | Mod: 95

## 2021-06-24 ENCOUNTER — APPOINTMENT (OUTPATIENT)
Dept: FAMILY MEDICINE | Facility: CLINIC | Age: 51
End: 2021-06-24
Payer: COMMERCIAL

## 2021-06-24 VITALS
DIASTOLIC BLOOD PRESSURE: 90 MMHG | WEIGHT: 228 LBS | TEMPERATURE: 97.9 F | HEART RATE: 86 BPM | SYSTOLIC BLOOD PRESSURE: 141 MMHG | BODY MASS INDEX: 35.79 KG/M2 | OXYGEN SATURATION: 96 % | HEIGHT: 67 IN

## 2021-06-24 PROCEDURE — 36415 COLL VENOUS BLD VENIPUNCTURE: CPT

## 2021-06-24 PROCEDURE — 99072 ADDL SUPL MATRL&STAF TM PHE: CPT

## 2021-06-24 PROCEDURE — 99214 OFFICE O/P EST MOD 30 MIN: CPT | Mod: 25

## 2021-06-24 NOTE — HEALTH RISK ASSESSMENT
[No] : No [1 or 2 (0 pts)] : 1 or 2 (0 points) [Never (0 pts)] : Never (0 points) [2] : 2) Feeling down, depressed, or hopeless for more than half of the days (2) [] : No [GYG0Ghymw] : 4 [JOS2Pnrna] : 9

## 2021-06-24 NOTE — REVIEW OF SYSTEMS
[Fatigue] : fatigue [Joint Pain] : joint pain [Muscle Weakness] : muscle weakness [Insomnia] : insomnia [Anxiety] : anxiety [Depression] : depression [Negative] : Neurological [Fever] : no fever [Chills] : no chills [Muscle Pain] : no muscle pain [Back Pain] : no back pain [Suicidal] : not suicidal [FreeTextEntry9] : Joint pain b/l shoulders, L. ankle.
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
## 2021-06-25 ENCOUNTER — TRANSCRIPTION ENCOUNTER (OUTPATIENT)
Age: 51
End: 2021-06-25

## 2021-06-25 LAB
ALBUMIN SERPL ELPH-MCNC: 5 G/DL
ALP BLD-CCNC: 87 U/L
ALT SERPL-CCNC: 57 U/L
ANION GAP SERPL CALC-SCNC: 16 MMOL/L
AST SERPL-CCNC: 29 U/L
BASOPHILS # BLD AUTO: 0.06 K/UL
BASOPHILS NFR BLD AUTO: 0.9 %
BILIRUB SERPL-MCNC: 0.5 MG/DL
BUN SERPL-MCNC: 12 MG/DL
CALCIUM SERPL-MCNC: 10.1 MG/DL
CHLORIDE SERPL-SCNC: 100 MMOL/L
CHOLEST SERPL-MCNC: 158 MG/DL
CO2 SERPL-SCNC: 24 MMOL/L
CREAT SERPL-MCNC: 1.23 MG/DL
EOSINOPHIL # BLD AUTO: 0.07 K/UL
EOSINOPHIL NFR BLD AUTO: 1.1 %
GLUCOSE SERPL-MCNC: 106 MG/DL
HCT VFR BLD CALC: 57.2 %
HDLC SERPL-MCNC: 35 MG/DL
HGB BLD-MCNC: 18.7 G/DL
IMM GRANULOCYTES NFR BLD AUTO: 0.5 %
LDLC SERPL CALC-MCNC: 92 MG/DL
LYMPHOCYTES # BLD AUTO: 2.01 K/UL
LYMPHOCYTES NFR BLD AUTO: 30.9 %
MAN DIFF?: NORMAL
MCHC RBC-ENTMCNC: 29.3 PG
MCHC RBC-ENTMCNC: 32.7 GM/DL
MCV RBC AUTO: 89.7 FL
MONOCYTES # BLD AUTO: 0.61 K/UL
MONOCYTES NFR BLD AUTO: 9.4 %
NEUTROPHILS # BLD AUTO: 3.72 K/UL
NEUTROPHILS NFR BLD AUTO: 57.2 %
NONHDLC SERPL-MCNC: 122 MG/DL
PLATELET # BLD AUTO: 223 K/UL
POTASSIUM SERPL-SCNC: 4.1 MMOL/L
PROT SERPL-MCNC: 7.4 G/DL
RBC # BLD: 6.38 M/UL
RBC # FLD: 12.8 %
SODIUM SERPL-SCNC: 140 MMOL/L
TRIGL SERPL-MCNC: 153 MG/DL
WBC # FLD AUTO: 6.5 K/UL

## 2021-06-26 ENCOUNTER — TRANSCRIPTION ENCOUNTER (OUTPATIENT)
Age: 51
End: 2021-06-26

## 2021-06-27 LAB
TESTOST BND SERPL-MCNC: 19 PG/ML
TESTOSTERONE TOTAL S: 521 NG/DL

## 2021-06-28 ENCOUNTER — APPOINTMENT (OUTPATIENT)
Dept: PSYCHIATRY | Facility: CLINIC | Age: 51
End: 2021-06-28
Payer: COMMERCIAL

## 2021-06-28 ENCOUNTER — TRANSCRIPTION ENCOUNTER (OUTPATIENT)
Age: 51
End: 2021-06-28

## 2021-06-28 PROCEDURE — 90837 PSYTX W PT 60 MINUTES: CPT | Mod: 95

## 2021-06-29 ENCOUNTER — NON-APPOINTMENT (OUTPATIENT)
Age: 51
End: 2021-06-29

## 2021-07-06 ENCOUNTER — APPOINTMENT (OUTPATIENT)
Dept: PSYCHIATRY | Facility: CLINIC | Age: 51
End: 2021-07-06
Payer: SELF-PAY

## 2021-07-06 PROCEDURE — NSD00D NO SHOW FEE: CUSTOM

## 2021-07-07 ENCOUNTER — NON-APPOINTMENT (OUTPATIENT)
Age: 51
End: 2021-07-07

## 2021-07-07 ENCOUNTER — RX RENEWAL (OUTPATIENT)
Age: 51
End: 2021-07-07

## 2021-07-14 ENCOUNTER — APPOINTMENT (OUTPATIENT)
Dept: PSYCHIATRY | Facility: CLINIC | Age: 51
End: 2021-07-14
Payer: COMMERCIAL

## 2021-07-14 PROCEDURE — 90837 PSYTX W PT 60 MINUTES: CPT | Mod: 95

## 2021-07-22 ENCOUNTER — APPOINTMENT (OUTPATIENT)
Dept: FAMILY MEDICINE | Facility: CLINIC | Age: 51
End: 2021-07-22

## 2021-07-23 ENCOUNTER — APPOINTMENT (OUTPATIENT)
Dept: PSYCHIATRY | Facility: CLINIC | Age: 51
End: 2021-07-23
Payer: COMMERCIAL

## 2021-07-23 PROCEDURE — 90837 PSYTX W PT 60 MINUTES: CPT | Mod: 95

## 2021-07-27 ENCOUNTER — TRANSCRIPTION ENCOUNTER (OUTPATIENT)
Age: 51
End: 2021-07-27

## 2021-07-30 ENCOUNTER — APPOINTMENT (OUTPATIENT)
Dept: PSYCHIATRY | Facility: CLINIC | Age: 51
End: 2021-07-30
Payer: COMMERCIAL

## 2021-07-30 PROCEDURE — 90837 PSYTX W PT 60 MINUTES: CPT | Mod: 95

## 2021-08-06 ENCOUNTER — APPOINTMENT (OUTPATIENT)
Dept: PSYCHIATRY | Facility: CLINIC | Age: 51
End: 2021-08-06
Payer: COMMERCIAL

## 2021-08-06 PROCEDURE — 90837 PSYTX W PT 60 MINUTES: CPT | Mod: 95

## 2021-08-10 ENCOUNTER — APPOINTMENT (OUTPATIENT)
Dept: PSYCHIATRY | Facility: CLINIC | Age: 51
End: 2021-08-10
Payer: COMMERCIAL

## 2021-08-10 PROCEDURE — 90837 PSYTX W PT 60 MINUTES: CPT | Mod: 95

## 2021-08-20 ENCOUNTER — APPOINTMENT (OUTPATIENT)
Dept: PSYCHIATRY | Facility: CLINIC | Age: 51
End: 2021-08-20

## 2021-08-20 ENCOUNTER — NON-APPOINTMENT (OUTPATIENT)
Age: 51
End: 2021-08-20

## 2021-08-23 ENCOUNTER — APPOINTMENT (OUTPATIENT)
Dept: PSYCHIATRY | Facility: CLINIC | Age: 51
End: 2021-08-23
Payer: COMMERCIAL

## 2021-08-23 PROCEDURE — 90837 PSYTX W PT 60 MINUTES: CPT | Mod: 95

## 2021-09-02 ENCOUNTER — APPOINTMENT (OUTPATIENT)
Dept: UROLOGY | Facility: CLINIC | Age: 51
End: 2021-09-02
Payer: COMMERCIAL

## 2021-09-02 ENCOUNTER — APPOINTMENT (OUTPATIENT)
Dept: PSYCHIATRY | Facility: CLINIC | Age: 51
End: 2021-09-02
Payer: COMMERCIAL

## 2021-09-02 ENCOUNTER — APPOINTMENT (OUTPATIENT)
Dept: FAMILY MEDICINE | Facility: CLINIC | Age: 51
End: 2021-09-02
Payer: COMMERCIAL

## 2021-09-02 VITALS
DIASTOLIC BLOOD PRESSURE: 91 MMHG | HEART RATE: 80 BPM | BODY MASS INDEX: 35.79 KG/M2 | WEIGHT: 228 LBS | OXYGEN SATURATION: 98 % | TEMPERATURE: 98 F | HEIGHT: 67 IN | SYSTOLIC BLOOD PRESSURE: 149 MMHG

## 2021-09-02 DIAGNOSIS — E34.9 ENDOCRINE DISORDER, UNSPECIFIED: ICD-10-CM

## 2021-09-02 PROCEDURE — 36415 COLL VENOUS BLD VENIPUNCTURE: CPT

## 2021-09-02 PROCEDURE — 99214 OFFICE O/P EST MOD 30 MIN: CPT | Mod: 95

## 2021-09-02 PROCEDURE — 90837 PSYTX W PT 60 MINUTES: CPT | Mod: 95

## 2021-09-02 PROCEDURE — 99213 OFFICE O/P EST LOW 20 MIN: CPT | Mod: 25

## 2021-09-02 RX ORDER — NYSTATIN 100000 [USP'U]/G
100000 POWDER TOPICAL
Qty: 1 | Refills: 2 | Status: ACTIVE | COMMUNITY
Start: 2021-09-02 | End: 1900-01-01

## 2021-09-02 NOTE — ASSESSMENT
[FreeTextEntry1] : The patient presented for a telehealth appointment.  His most recent hematocrit was 57%.  He did not treat that as requested by having himself phlebotomized.  He is seeing his medical doctor today and will get repeat blood test.  He will also see a hematologist for being  phlebotomized.  I discussed with him that I will not renew his prescription until his hematocrit is under better control.  He understands and agrees.  I will speak with him in 2 weeks by telehealth.\par \par Telehealth Consultation: 30 minutes  10 minutes reviewing his history and discussing prior results.  20 minutes discussing various treatment options, writing  requests for lab testing and writing his note. There was also additional time in preparing for the visit and assisting the patient with technology issues he was having with the telehealth platform.

## 2021-09-02 NOTE — HISTORY OF PRESENT ILLNESS
[Home] : at home, [unfilled] , at the time of the visit. [Medical Office: ([facility])___] : at the medical office located in  [Verbal consent obtained from patient] : the patient, [unfilled] [FreeTextEntry1] : The patient-doctor. relationship has been established in a face-to-face fashion on real-time video audio HIPAA compliant communication using telemedicine software.  The patient's identity has been confirmed.  The patient was previously emailed a copy of the telemedicine consent.  The patient has had a chance to review and has now given verbal consent and has requested care to be assessed and treated through telemedicine. The patient understands there may be limitations in this process and that they need not need further follow-up care in the office and/or hospital settings.  We were unable to use the [employer] platform and an alternative platform was utilized.\par \par Verbal consent was given on Wednesday, Ayanna 10, 2020 at 1:45 PM by the patient.  It was requested by the physician.  A written consent was previously sent for the patient to sign and return.\par \par The patient presented for a telehealth appointment.  His most recent hematocrit was 57%.  He did not treat that as requested by having himself phlebotomized.  He is seeing his medical doctor today and will get repeat blood test.  He will also see a hematologist for being  phlebotomized.\par \par PMH: Patient initially presented with the chief complaint of testosterone for evaluation. From the age of [age] the patient had been abusing androgens. He then had 2 children with her partner who he subsequently . For the past year and a half he's been placed on testosterone and is taking 150 mg a week injectable testosterone cyprionate. He has no known drug allergies.  His past medical history demonstrates hypertension and hypercholesterolemia.  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  He does smoke and drinks only socially.  He has no known drug allergies.  His review of systems is non-contributory. His family history is not significant.\par

## 2021-09-02 NOTE — HISTORY OF PRESENT ILLNESS
[Hypertension] : Hypertension [Other: ___] : [unfilled] [FreeTextEntry6] : pt is here for med rx and for blood work\par Doing well on ADD Meds. No side effects. No weight gain. No weight loss. No excessive jitteriness. No difficulties. Working on level of concentration\par Requested covid ab's

## 2021-09-03 LAB
COVID-19 NUCLEOCAPSID  GAM ANTIBODY INTERPRETATION: NEGATIVE
COVID-19 SPIKE DOMAIN ANTIBODY INTERPRETATION: POSITIVE
SARS-COV-2 AB SERPL IA-ACNC: >250 U/ML
SARS-COV-2 AB SERPL QL IA: 0.57 INDEX

## 2021-09-08 ENCOUNTER — NON-APPOINTMENT (OUTPATIENT)
Age: 51
End: 2021-09-08

## 2021-09-10 ENCOUNTER — APPOINTMENT (OUTPATIENT)
Dept: PSYCHIATRY | Facility: CLINIC | Age: 51
End: 2021-09-10
Payer: SELF-PAY

## 2021-09-10 ENCOUNTER — NON-APPOINTMENT (OUTPATIENT)
Age: 51
End: 2021-09-10

## 2021-09-10 PROCEDURE — NSD00D NO SHOW FEE: CUSTOM

## 2021-09-13 ENCOUNTER — NON-APPOINTMENT (OUTPATIENT)
Age: 51
End: 2021-09-13

## 2021-09-13 LAB
25(OH)D3 SERPL-MCNC: 62 NG/ML
ALBUMIN SERPL ELPH-MCNC: 5 G/DL
ALP BLD-CCNC: 77 U/L
ALT SERPL-CCNC: 46 U/L
ANION GAP SERPL CALC-SCNC: 12 MMOL/L
AST SERPL-CCNC: 30 U/L
BASOPHILS # BLD AUTO: 0.05 K/UL
BASOPHILS NFR BLD AUTO: 0.8 %
BILIRUB SERPL-MCNC: 0.8 MG/DL
BUN SERPL-MCNC: 18 MG/DL
CALCIUM SERPL-MCNC: 9.5 MG/DL
CHLORIDE SERPL-SCNC: 99 MMOL/L
CHOLEST SERPL-MCNC: 140 MG/DL
CO2 SERPL-SCNC: 24 MMOL/L
CREAT SERPL-MCNC: 1.3 MG/DL
EOSINOPHIL # BLD AUTO: 0.06 K/UL
EOSINOPHIL NFR BLD AUTO: 0.9 %
ESTIMATED AVERAGE GLUCOSE: 117 MG/DL
ESTRADIOL SERPL-MCNC: 24 PG/ML
GLUCOSE SERPL-MCNC: 102 MG/DL
HBA1C MFR BLD HPLC: 5.7 %
HCT VFR BLD CALC: 52 %
HDLC SERPL-MCNC: 37 MG/DL
HGB BLD-MCNC: 16.7 G/DL
IMM GRANULOCYTES NFR BLD AUTO: 0.2 %
LDLC SERPL CALC-MCNC: 88 MG/DL
LH SERPL-ACNC: <0.3 IU/L
LYMPHOCYTES # BLD AUTO: 1.9 K/UL
LYMPHOCYTES NFR BLD AUTO: 29.7 %
MAN DIFF?: NORMAL
MCHC RBC-ENTMCNC: 29.2 PG
MCHC RBC-ENTMCNC: 32.1 GM/DL
MCV RBC AUTO: 91.1 FL
MONOCYTES # BLD AUTO: 0.55 K/UL
MONOCYTES NFR BLD AUTO: 8.6 %
NEUTROPHILS # BLD AUTO: 3.82 K/UL
NEUTROPHILS NFR BLD AUTO: 59.8 %
NONHDLC SERPL-MCNC: 102 MG/DL
PLATELET # BLD AUTO: 234 K/UL
POTASSIUM SERPL-SCNC: 4.2 MMOL/L
PROLACTIN SERPL-MCNC: 7.8 NG/ML
PROT SERPL-MCNC: 7.1 G/DL
PSA SERPL-MCNC: 1.32 NG/ML
RBC # BLD: 5.71 M/UL
RBC # FLD: 15.1 %
SODIUM SERPL-SCNC: 134 MMOL/L
TESTOST BND SERPL-MCNC: 5.1 PG/ML
TESTOSTERONE TOTAL S: 307 NG/DL
TRIGL SERPL-MCNC: 72 MG/DL
TSH SERPL-ACNC: 1.69 UIU/ML
WBC # FLD AUTO: 6.39 K/UL

## 2021-09-15 ENCOUNTER — APPOINTMENT (OUTPATIENT)
Dept: PSYCHIATRY | Facility: CLINIC | Age: 51
End: 2021-09-15
Payer: COMMERCIAL

## 2021-09-15 PROCEDURE — 90837 PSYTX W PT 60 MINUTES: CPT | Mod: 95

## 2021-09-17 ENCOUNTER — NON-APPOINTMENT (OUTPATIENT)
Age: 51
End: 2021-09-17

## 2021-09-24 ENCOUNTER — NON-APPOINTMENT (OUTPATIENT)
Age: 51
End: 2021-09-24

## 2021-09-24 ENCOUNTER — APPOINTMENT (OUTPATIENT)
Dept: PSYCHIATRY | Facility: CLINIC | Age: 51
End: 2021-09-24

## 2021-09-27 ENCOUNTER — APPOINTMENT (OUTPATIENT)
Dept: CARDIOLOGY | Facility: CLINIC | Age: 51
End: 2021-09-27

## 2021-09-27 ENCOUNTER — NON-APPOINTMENT (OUTPATIENT)
Age: 51
End: 2021-09-27

## 2021-10-05 ENCOUNTER — APPOINTMENT (OUTPATIENT)
Dept: PSYCHIATRY | Facility: CLINIC | Age: 51
End: 2021-10-05

## 2021-10-07 ENCOUNTER — NON-APPOINTMENT (OUTPATIENT)
Age: 51
End: 2021-10-07

## 2021-10-08 ENCOUNTER — APPOINTMENT (OUTPATIENT)
Dept: PSYCHIATRY | Facility: CLINIC | Age: 51
End: 2021-10-08

## 2021-10-10 ENCOUNTER — APPOINTMENT (OUTPATIENT)
Age: 51
End: 2021-10-10

## 2021-10-12 ENCOUNTER — NON-APPOINTMENT (OUTPATIENT)
Age: 51
End: 2021-10-12

## 2021-10-12 ENCOUNTER — RX RENEWAL (OUTPATIENT)
Age: 51
End: 2021-10-12

## 2021-10-15 ENCOUNTER — APPOINTMENT (OUTPATIENT)
Dept: PSYCHIATRY | Facility: CLINIC | Age: 51
End: 2021-10-15

## 2021-10-19 ENCOUNTER — NON-APPOINTMENT (OUTPATIENT)
Age: 51
End: 2021-10-19

## 2021-10-19 ENCOUNTER — EMERGENCY (EMERGENCY)
Facility: HOSPITAL | Age: 51
LOS: 1 days | Discharge: LEFT BEFORE DISCHARGE | End: 2021-10-19
Attending: EMERGENCY MEDICINE
Payer: COMMERCIAL

## 2021-10-19 VITALS
WEIGHT: 225.09 LBS | HEART RATE: 85 BPM | HEIGHT: 61 IN | TEMPERATURE: 98 F | OXYGEN SATURATION: 99 % | RESPIRATION RATE: 18 BRPM

## 2021-10-19 VITALS
SYSTOLIC BLOOD PRESSURE: 150 MMHG | DIASTOLIC BLOOD PRESSURE: 86 MMHG | OXYGEN SATURATION: 97 % | HEART RATE: 83 BPM | RESPIRATION RATE: 20 BRPM

## 2021-10-19 DIAGNOSIS — Z96.7 PRESENCE OF OTHER BONE AND TENDON IMPLANTS: Chronic | ICD-10-CM

## 2021-10-19 PROCEDURE — 99284 EMERGENCY DEPT VISIT MOD MDM: CPT

## 2021-10-19 NOTE — ED PROVIDER NOTE - CLINICAL SUMMARY MEDICAL DECISION MAKING FREE TEXT BOX
50 y/o M hx of CAD (followed by [facility]), HTN, DVT 2 years ago not on blood thinners, presents w/ chest pain for the past 1 day. states he had a CCTA within the last 1 year which showed mild CAD. evaluated at [facility] aprox 1 month ago for similar complaints.   ekg, trop x2 - r/o acs   chest xray  cbc, cmp, bnp   cardiac monitor

## 2021-10-19 NOTE — ED PROVIDER NOTE - NS ED ROS FT
General: Denies fever, chills  HEENT: Denies sensory changes, sore throat  Neck: Denies neck pain, neck stiffness  Resp: Denies coughing, SOB  Cardiovascular: + CP, Denies palpitations, LE edema  GI: Denies nausea, vomiting, abdominal pain, diarrhea, constipation, blood in stool  : Denies dysuria, hematuria, frequency, incontinence  MSK: Denies back pain  Neuro: Denies HA, dizziness, numbness, weakness  Skin: Denies rashes.

## 2021-10-19 NOTE — ED PROVIDER NOTE - PHYSICAL EXAMINATION
General: Well appearing male in no acute distress  HEENT: Normocephalic, atraumatic. Moist mucous membranes. Oropharynx clear. No lymphadenopathy.  Eyes: No scleral icterus. EOMI. HARINDER.  Neck:. Soft and supple. Full ROM without pain. No midline tenderness  Cardiac: Regular rate and regular rhythm. No murmurs, rubs, gallops. Peripheral pulses 2+ and symmetric. No LE edema.  Resp: Lungs CTAB. Speaking in full sentences. No wheezes, rales or rhonchi.  Abd: Soft, non-tender, non-distended. No guarding or rebound. No scars, masses, or lesions.  Back: Spine midline and non-tender. No CVA tenderness.    Skin: No rashes, abrasions, or lacerations.  Neuro: AO x 3. Moves all extremities symmetrically. Motor strength and sensation grossly intact.

## 2021-10-19 NOTE — ED PROVIDER NOTE - OBJECTIVE STATEMENT
52 y/o M hx of CAD (followed by [facility]), HTN, DVT 2 years ago not on blood thinners, presents w/ chest pain for the past 1 day. States he was lying down when chest pain started, located over L chest, jaw and L arm. States he was seen at [facility] apro 1 month ago for similar symptoms and had negative bloodwork. states chest pain is worse w/ exertion, nonpleuritic. Denies fever/chills. Denies trauma. Denies abdominal pain. Denies cocaine/drug use.

## 2021-10-19 NOTE — ED PROVIDER NOTE - ATTENDING CONTRIBUTION TO CARE
52 y/o M with CAD, HTN, DVT presented for chest pain x 1 day.  Patient eloped prior to my evaluation, patient did not receive any labs or imaging, no IV was placed.

## 2021-10-19 NOTE — ED PROVIDER NOTE - PROGRESS NOTE DETAILS
Arsenio: spoke to dr. dia, cardiologist, who recommended holding patient for observation for potential echo and further cardiac intervention in the morning. Arsenio: patient nowhere to be found, attempted to look for patient multiple times

## 2021-10-20 ENCOUNTER — APPOINTMENT (OUTPATIENT)
Dept: CARDIOLOGY | Facility: CLINIC | Age: 51
End: 2021-10-20
Payer: COMMERCIAL

## 2021-10-20 VITALS
HEIGHT: 67 IN | RESPIRATION RATE: 16 BRPM | BODY MASS INDEX: 36.1 KG/M2 | DIASTOLIC BLOOD PRESSURE: 100 MMHG | SYSTOLIC BLOOD PRESSURE: 150 MMHG | OXYGEN SATURATION: 98 % | WEIGHT: 230 LBS | HEART RATE: 99 BPM

## 2021-10-20 PROCEDURE — 93000 ELECTROCARDIOGRAM COMPLETE: CPT

## 2021-10-20 PROCEDURE — 99214 OFFICE O/P EST MOD 30 MIN: CPT

## 2021-10-20 RX ORDER — SYRINGE WITH NEEDLE, 1 ML 25GX5/8"
22G X 1-1/2" SYRINGE, EMPTY DISPOSABLE MISCELLANEOUS
Qty: 24 | Refills: 0 | Status: DISCONTINUED | COMMUNITY
Start: 2021-07-07 | End: 2021-10-20

## 2021-10-20 RX ORDER — ATORVASTATIN CALCIUM 20 MG/1
20 TABLET, FILM COATED ORAL DAILY
Qty: 90 | Refills: 3 | Status: DISCONTINUED | COMMUNITY
Start: 1900-01-01 | End: 2021-10-20

## 2021-10-20 RX ORDER — TESTOSTERONE CYPIONATE 200 MG/ML
200 INJECTION, SOLUTION INTRAMUSCULAR
Qty: 10 | Refills: 0 | Status: DISCONTINUED | COMMUNITY
Start: 2018-05-17 | End: 2021-10-20

## 2021-10-20 NOTE — HISTORY OF PRESENT ILLNESS
[FreeTextEntry1] : Continues to be very anxious about his nonobstructive coronary disease. We have had several conversations about his  coronary  CTA of  2020 which revealed 25-49% stenosis of the prox LAD and prox. RCA with  less then 25%, otherwise normal study.\par \par Previous work up through  Dr. Christine/ cardiology of [place] and had a nuclear stress test , echo and carotid.  By his report has a small " whole in his heart". Results not available, made several attempts to obtain records which have been unsuccessful. \par \par Had a calcium scoring done about 6+ years ago. Those results were not available.\par \par On Amphetamine-Dextroamphetamine for ADHD\par Uses no alcohol or caffeine-containing beverages.\par Palps have not been a recent issue. \par \par HX of AC therapy / Eliquis for a DVT after breaking his ankle.\par \par \par \par \par \par \par \par

## 2021-10-20 NOTE — ASSESSMENT
[FreeTextEntry1] : ECG- SR at 99 bpm  No ST Or T wave abn\par \par Lab data \par         5/8/21 9/2/21\par Chol. 125               140\par HDL   29                  37\par LDL 27                    88\par Tri. 344\par HGB 16.7\par A1C 5.3\par Creat. 1.27\par \par Lab data \par   9/29/20\par TC      135\par LDL      91\par HDL     34\par Tri.      96\par Creat. 1.25\par HGB 16.9\par \par CT angiogram 9/29/2020:\par Left main:  no plaquing\par LAD:          25 to 49% mild mid vessel atherosclerotic plaquing\par Circumflex: no plaque\par Ramus:       no plaque\par RCA :          mild proximal  < 25% proximal RCA plaque\par Calcium score 179\par \par Echocardiogram 7/18/18:  Mild concentric hypertrophy with overall normal left ventricular systolic function.  LVEF 65%.  Focal aortic sclerosis.  Otherwise unremarkable echocardiogram. \par \par Carotid Doppler 7/18/18 showed no intimal thickening or plaquing.  Study is within normal limits.  \par \par Exercise stress testing 7/2/18:  Patient exercised on a standard Delta protocol for a total of 12 minutes and 15 seconds with a peak heart rate of 155 (90% maximally predicted).  Normal blood pressure response.  No significant symptoms.  Rare apices.  No ischemic ECG changes.   \par \par The exercise stress test shows excellent exercise tolerance without any exercise induced ischemia, arrhythmia, or hypertension.  \par \par Impression:  \par 1.  He has evidence of mild atherosclerotic disease of the LAD and RCA \par      CTA of coronaries with mild nonobstructive disease to the LAD \par     -Now with new ASHBY and short lived chest pain. \par     -2 trips to the ER for for symptoms. By his report [place] w/u with negative cardiac markers and  wanted to            admit him but he left. At  [facility] he  did not  stay long enough for work up, frustrated with wait time. \par      -ECG today with out any evidence of ischemia\par \par 2.  No longer exercising b/c discomfort. \par \par 3.   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 \par \par 4.  He does have mild concentric hypertrophy seen on last echo done in our practice. \par      There is an outside echocardiogram performed that is not available to review\par \par 5.  The patient has a history of hypertension controlled on Losartan-HCTZ.\par \par 6. LDL not at target.\par \par 7. There is a very strong Fam Hx including  Father - CABG at 48 y/o and Brother stents x 3 at 44 y/o.\par \par \par Plan\par In view of,\par Very strong family history of premature coronary disease\par Known atherosclerotic coronary disease\par Recurrent bouts of chest pain\par Exertional dyspnea,\par Multiple ER visits\par Suggest at this point in time to the patient we best served by cardiac catheterization to definitively arrive at a conclusion as to whether or not his symptoms reflect Favian insufficiency or not\par \par The procedure, indications, risks benefits alternatives all defined in detail and the patient understands and agrees to proceed\par 1. Goal of therapy for  LDL <70 with diagnosis of  nonobstructive CAD and strong family hx.  Will start     Rosuvastatin 20 mg QD\par -  Repeat BW to reassess LFTS and lipids due in 2-3 months \par \par 2. Understands that importance of diet modification with his CRFs.  Specially triglycerides which have been labile in response to dietary indiscretions.  Omega-3 fatty acids could be titrated upwards\par \par 3.. Avoid caffeine, ETOH and pre-work out\par \par 4. Hold off on exercising until after his procedure. \par \par Clinical follow up  after [facility]

## 2021-10-20 NOTE — REASON FOR VISIT
[FreeTextEntry1] : This is a 51 year old patient returning for cardiac reevaluation.\par \par His concerns include:\par 1. ECG abnormalities.\par 2. A strong family history for premature CAD\par 3. History of hyperlipidemia\par 4. A prior history of hypertension\par 5. Prior history of obesity.\par 6.  Mild LAD and RCA atherosclerosis by Cardiac CTA 2020\par \par Pt  presenting after ER work up at [place] a few weeks ago for left sided chest pain with radiation to his  jaw, ASHBY and indigestion. By his report they wanted to admit him for a possible angiogram but he left. \par \par Yesterday he went to [facility] for same symptoms but after a 2 hr wait he eloped. \par \par Chest pain from yesterday was similar to what he experienced in Sept. Believes this is different from his usual description. Chest pains are now quick and very short lived.  Discomfort is now limiting him from exercising, and has become associated with increasing exertional dyspnea.\par \par Sleep study pending through Dr. Jean ??

## 2021-10-22 ENCOUNTER — APPOINTMENT (OUTPATIENT)
Dept: FAMILY MEDICINE | Facility: CLINIC | Age: 51
End: 2021-10-22

## 2021-10-26 ENCOUNTER — APPOINTMENT (OUTPATIENT)
Dept: DISASTER EMERGENCY | Facility: CLINIC | Age: 51
End: 2021-10-26

## 2021-10-27 LAB — SARS-COV-2 N GENE NPH QL NAA+PROBE: NOT DETECTED

## 2021-10-28 RX ORDER — CHLORHEXIDINE GLUCONATE 213 G/1000ML
1 SOLUTION TOPICAL ONCE
Refills: 0 | Status: DISCONTINUED | OUTPATIENT
Start: 2021-10-29 | End: 2021-11-12

## 2021-10-28 NOTE — H&P PST ADULT - NSICDXPASTMEDICALHX_GEN_ALL_CORE_FT
PAST MEDICAL HISTORY:  HTN (hypertension)     HTN (hypertension)     Hypercholesteremia     Hypothyroidism     Unilateral inguinal hernia without obstruction or gangrene, recurrence not specified

## 2021-10-28 NOTE — H&P PST ADULT - ASSESSMENT
Impression:      Plan:  -plan for LHC via RA vs FA  -patient seen and examined  -confirmed appropriate NPO duration  -ECG and Labs reviewed  -Aspirin 81mg po pre-cath  -procedure discussed with patient; risks and benefits explained, questions answered  -consent obtained by attending IC        50yo male with h/o HTN, HLD, strong FH premature CAD, LLE DVT (??year not on A/C), hypothyroid, obesity, DIANE (***?CPAP), latent TB, HSV, GERD, BPH with obstructive uropathy, erectile dysfunction (PRN Viagra), cervical radiculopathy, PTSD, anxiety, ADD, followed by Dr. Ge, known abnormal ECG and CCTA 9/2020 with  (LMCA-0, LAD-165, RI-0, LCx-0, RCA-14), pLAD 25-49% and pRCA <25% stenosis, experiencing worsening and more frequent episodes of exertional left-sided CP and ASHBY, 2 recent ED presentations ([place] with neg CE and refused adm and [facility] and eloped due to wait time in ED), now presents for LHC +/- PCI to be performed by Dr. Larson.       Plan:  -plan for LHC via RA vs FA  -patient seen and examined  -confirmed appropriate NPO duration  -ECG and Labs reviewed  -Aspirin 81mg po pre-cath   -procedure discussed with patient; risks and benefits explained, questions answered  -consent obtained by attending IC

## 2021-10-28 NOTE — H&P PST ADULT - RS GEN PE MLT RESP DETAILS PC
normal/airway patent/good air movement/respirations non-labored/clear to auscultation bilaterally/no rhonchi/no wheezes

## 2021-10-28 NOTE — H&P PST ADULT - NSICDXPASTSURGICALHX_GEN_ALL_CORE_FT
PAST SURGICAL HISTORY:  No significant past surgical history     S/P ORIF (open reduction internal fixation) fracture left ankle

## 2021-10-29 ENCOUNTER — TRANSCRIPTION ENCOUNTER (OUTPATIENT)
Age: 51
End: 2021-10-29

## 2021-10-29 ENCOUNTER — OUTPATIENT (OUTPATIENT)
Dept: OUTPATIENT SERVICES | Facility: HOSPITAL | Age: 51
LOS: 1 days | End: 2021-10-29
Payer: COMMERCIAL

## 2021-10-29 VITALS
OXYGEN SATURATION: 98 % | RESPIRATION RATE: 15 BRPM | DIASTOLIC BLOOD PRESSURE: 78 MMHG | HEART RATE: 60 BPM | SYSTOLIC BLOOD PRESSURE: 112 MMHG

## 2021-10-29 VITALS
RESPIRATION RATE: 16 BRPM | DIASTOLIC BLOOD PRESSURE: 76 MMHG | SYSTOLIC BLOOD PRESSURE: 114 MMHG | HEART RATE: 63 BPM | TEMPERATURE: 98 F | OXYGEN SATURATION: 98 %

## 2021-10-29 DIAGNOSIS — E78.5 HYPERLIPIDEMIA, UNSPECIFIED: ICD-10-CM

## 2021-10-29 DIAGNOSIS — Z96.7 PRESENCE OF OTHER BONE AND TENDON IMPLANTS: Chronic | ICD-10-CM

## 2021-10-29 LAB
ALBUMIN SERPL ELPH-MCNC: 4.4 G/DL — SIGNIFICANT CHANGE UP (ref 3.3–5.2)
ALP SERPL-CCNC: 81 U/L — SIGNIFICANT CHANGE UP (ref 40–120)
ALT FLD-CCNC: 41 U/L — HIGH
ANION GAP SERPL CALC-SCNC: 13 MMOL/L — SIGNIFICANT CHANGE UP (ref 5–17)
AST SERPL-CCNC: 24 U/L — SIGNIFICANT CHANGE UP
BASOPHILS # BLD AUTO: 0.07 K/UL — SIGNIFICANT CHANGE UP (ref 0–0.2)
BASOPHILS NFR BLD AUTO: 1.3 % — SIGNIFICANT CHANGE UP (ref 0–2)
BILIRUB SERPL-MCNC: 0.5 MG/DL — SIGNIFICANT CHANGE UP (ref 0.4–2)
BUN SERPL-MCNC: 19.4 MG/DL — SIGNIFICANT CHANGE UP (ref 8–20)
CALCIUM SERPL-MCNC: 9 MG/DL — SIGNIFICANT CHANGE UP (ref 8.6–10.2)
CHLORIDE SERPL-SCNC: 102 MMOL/L — SIGNIFICANT CHANGE UP (ref 98–107)
CHOLEST SERPL-MCNC: 140 MG/DL — SIGNIFICANT CHANGE UP
CO2 SERPL-SCNC: 23 MMOL/L — SIGNIFICANT CHANGE UP (ref 22–29)
CREAT SERPL-MCNC: 1.06 MG/DL — SIGNIFICANT CHANGE UP (ref 0.5–1.3)
EOSINOPHIL # BLD AUTO: 0.09 K/UL — SIGNIFICANT CHANGE UP (ref 0–0.5)
EOSINOPHIL NFR BLD AUTO: 1.7 % — SIGNIFICANT CHANGE UP (ref 0–6)
GLUCOSE SERPL-MCNC: 110 MG/DL — HIGH (ref 70–99)
HCT VFR BLD CALC: 44.7 % — SIGNIFICANT CHANGE UP (ref 39–50)
HDLC SERPL-MCNC: 40 MG/DL — LOW
HGB BLD-MCNC: 15.6 G/DL — SIGNIFICANT CHANGE UP (ref 13–17)
IMM GRANULOCYTES NFR BLD AUTO: 0.2 % — SIGNIFICANT CHANGE UP (ref 0–1.5)
LIPID PNL WITH DIRECT LDL SERPL: 80 MG/DL — SIGNIFICANT CHANGE UP
LYMPHOCYTES # BLD AUTO: 1.93 K/UL — SIGNIFICANT CHANGE UP (ref 1–3.3)
LYMPHOCYTES # BLD AUTO: 36.9 % — SIGNIFICANT CHANGE UP (ref 13–44)
MAGNESIUM SERPL-MCNC: 2 MG/DL — SIGNIFICANT CHANGE UP (ref 1.8–2.6)
MCHC RBC-ENTMCNC: 29.9 PG — SIGNIFICANT CHANGE UP (ref 27–34)
MCHC RBC-ENTMCNC: 34.9 GM/DL — SIGNIFICANT CHANGE UP (ref 32–36)
MCV RBC AUTO: 85.8 FL — SIGNIFICANT CHANGE UP (ref 80–100)
MONOCYTES # BLD AUTO: 0.51 K/UL — SIGNIFICANT CHANGE UP (ref 0–0.9)
MONOCYTES NFR BLD AUTO: 9.8 % — SIGNIFICANT CHANGE UP (ref 2–14)
NEUTROPHILS # BLD AUTO: 2.62 K/UL — SIGNIFICANT CHANGE UP (ref 1.8–7.4)
NEUTROPHILS NFR BLD AUTO: 50.1 % — SIGNIFICANT CHANGE UP (ref 43–77)
NON HDL CHOLESTEROL: 100 MG/DL — SIGNIFICANT CHANGE UP
PLATELET # BLD AUTO: 220 K/UL — SIGNIFICANT CHANGE UP (ref 150–400)
POTASSIUM SERPL-MCNC: 3.8 MMOL/L — SIGNIFICANT CHANGE UP (ref 3.5–5.3)
POTASSIUM SERPL-SCNC: 3.8 MMOL/L — SIGNIFICANT CHANGE UP (ref 3.5–5.3)
PROT SERPL-MCNC: 7 G/DL — SIGNIFICANT CHANGE UP (ref 6.6–8.7)
RBC # BLD: 5.21 M/UL — SIGNIFICANT CHANGE UP (ref 4.2–5.8)
RBC # FLD: 12.1 % — SIGNIFICANT CHANGE UP (ref 10.3–14.5)
SODIUM SERPL-SCNC: 138 MMOL/L — SIGNIFICANT CHANGE UP (ref 135–145)
TRIGL SERPL-MCNC: 98 MG/DL — SIGNIFICANT CHANGE UP
WBC # BLD: 5.23 K/UL — SIGNIFICANT CHANGE UP (ref 3.8–10.5)
WBC # FLD AUTO: 5.23 K/UL — SIGNIFICANT CHANGE UP (ref 3.8–10.5)

## 2021-10-29 PROCEDURE — 99152 MOD SED SAME PHYS/QHP 5/>YRS: CPT

## 2021-10-29 PROCEDURE — 93010 ELECTROCARDIOGRAM REPORT: CPT

## 2021-10-29 PROCEDURE — 80053 COMPREHEN METABOLIC PANEL: CPT

## 2021-10-29 PROCEDURE — 93458 L HRT ARTERY/VENTRICLE ANGIO: CPT

## 2021-10-29 PROCEDURE — 85025 COMPLETE CBC W/AUTO DIFF WBC: CPT

## 2021-10-29 PROCEDURE — 93005 ELECTROCARDIOGRAM TRACING: CPT

## 2021-10-29 PROCEDURE — 80061 LIPID PANEL: CPT

## 2021-10-29 PROCEDURE — 93458 L HRT ARTERY/VENTRICLE ANGIO: CPT | Mod: 26

## 2021-10-29 PROCEDURE — C1887: CPT

## 2021-10-29 PROCEDURE — 83735 ASSAY OF MAGNESIUM: CPT

## 2021-10-29 PROCEDURE — C1769: CPT

## 2021-10-29 PROCEDURE — 36415 COLL VENOUS BLD VENIPUNCTURE: CPT

## 2021-10-29 RX ORDER — LEVOTHYROXINE SODIUM 125 MCG
1 TABLET ORAL
Qty: 0 | Refills: 0 | DISCHARGE

## 2021-10-29 RX ORDER — ATORVASTATIN CALCIUM 80 MG/1
1 TABLET, FILM COATED ORAL
Qty: 0 | Refills: 0 | DISCHARGE

## 2021-10-29 RX ORDER — LOSARTAN/HYDROCHLOROTHIAZIDE 100MG-25MG
1 TABLET ORAL
Qty: 0 | Refills: 0 | DISCHARGE

## 2021-10-29 RX ORDER — ALPRAZOLAM 0.25 MG
0 TABLET ORAL
Qty: 0 | Refills: 0 | DISCHARGE

## 2021-10-29 RX ORDER — ATORVASTATIN CALCIUM 80 MG/1
1 TABLET, FILM COATED ORAL
Qty: 40 | Refills: 0
Start: 2021-10-29 | End: 2021-11-27

## 2021-10-29 RX ORDER — LOSARTAN POTASSIUM 100 MG/1
1 TABLET, FILM COATED ORAL
Qty: 0 | Refills: 0 | DISCHARGE

## 2021-10-29 RX ORDER — ZOLPIDEM TARTRATE 10 MG/1
1 TABLET ORAL
Qty: 0 | Refills: 0 | DISCHARGE

## 2021-10-29 RX ORDER — OMEGA-3 ACID ETHYL ESTERS 1 G
0 CAPSULE ORAL
Qty: 0 | Refills: 0 | DISCHARGE

## 2021-10-29 RX ORDER — ROSUVASTATIN CALCIUM 5 MG/1
1 TABLET ORAL
Qty: 90 | Refills: 0
Start: 2021-10-29 | End: 2022-01-26

## 2021-10-29 RX ORDER — FAMOTIDINE 10 MG/ML
0 INJECTION INTRAVENOUS
Qty: 0 | Refills: 0 | DISCHARGE

## 2021-10-29 RX ORDER — ASPIRIN/CALCIUM CARB/MAGNESIUM 324 MG
0 TABLET ORAL
Qty: 0 | Refills: 0 | DISCHARGE

## 2021-10-29 NOTE — DISCHARGE NOTE PROVIDER - CARE PROVIDER_API CALL
Justin Ge)  Cardiovascular Disease  [address] [place] [address]  Phone: [phone]  Fax: [phone]  Follow Up Time:

## 2021-10-29 NOTE — ASU PATIENT PROFILE, ADULT - PRO MENTAL HEALTH SX RECENT
Discussed lab results with both mother and father separately. All labs were reassuring. Discussed limiting water before bed, reemphasized importance of good food choices/portion control/exercise. Would like to see Eliri again in 3 months or so. Parents verbalized understanding and agreed with plan. All questions answered.    none

## 2021-10-29 NOTE — PROGRESS NOTE ADULT - SUBJECTIVE AND OBJECTIVE BOX
Department of Cardiology                                                                  [facility]/[place] [id number]                                                            Telephone: [phone]. Fax:[phone]                                                                                      Cardiac Cath NP Note       Narrative:    52yo male with h/o HTN, HLD, strong FH premature CAD, LLE DVT (??year not on A/C), hypothyroid, obesity, DIANE (***?CPAP), latent TB, HSV, GERD, BPH with obstructive uropathy, erectile dysfunction (PRN Viagra), cervical radiculopathy, PTSD, anxiety, ADD, followed by Dr. Ge, known abnormal ECG and CCTA 9/2020 with  (LMCA-0, LAD-165, RI-0, LCx-0, RCA-14), pLAD 25-49% and pRCA <25% stenosis, experiencing worsening and more frequent episodes of exertional left-sided CP and ASHBY, 2 recent ED presentations ([place] with neg CE and refused adm and [facility] and eloped due to wait time in ED), now presents for LHC +/- PCI to be performed by Dr. Larson.     Patient now status post left heart catheterization via right radial artery which showed normal coronaries. Continue current medical management. Follow with Dr. MARLYN Ge in 1-2 weeks.     DIAGNOSTIC IMPRESSIONS: There is no significant coronary artery disease.  Left ventricular function is normal.  LVEF=55%      PAST MEDICAL & SURGICAL HISTORY:  HTN (hypertension)  Hypercholesteremia  Hypothyroidism  Unilateral inguinal hernia without obstruction or gangrene, recurrence not specified  HTN (hypertension)  No significant past surgical history  S/P ORIF (open reduction internal fixation) fracture left ankle      FAMILY HISTORY:    Home Medications:  Ambien 10 mg oral tablet: 1 tab(s) orally once a day (at bedtime) (29 Oct 2021 11:31)  aspirin 81 mg oral delayed release capsule:  orally  (29 Oct 2021 11:31)  atorvastatin 20 mg oral tablet: 1 tab(s) orally once a day (29 Oct 2021 11:31)  losartan-hydrochlorothiazide 100 mg-25 mg oral tablet: 1 tab(s) orally once a day (29 Oct 2021 11:31)  Multiple Vitamins oral tablet: 1 tab(s) orally once a day (29 Oct 2021 11:31)  Omega-3 oral capsule:  (29 Oct 2021 11:31)  Synthroid 50 mcg (0.05 mg) oral tablet: 1 tab(s) orally once a day (29 Oct 2021 11:31)  Xanax 0.25 mg oral tablet:  (29 Oct 2021 11:31)                 15.6   5.23  )-----------( 220      ( 29 Oct 2021 11:27 )             44.7     10-29    138  |  102  |  19.4  ----------------------------<  110<H>  3.8   |  23.0  |  1.06    Ca    9.0      29 Oct 2021 11:27  Mg     2.0     10-29    TPro  7.0  /  Alb  4.4  /  TBili  0.5  /  DBili  x   /  AST  24  /  ALT  41<H>  /  AlkPhos  81  10-29        General: No fatigue, no fevers/chills  Respiratory: No dyspnea, no cough, no wheeze  CV: No chest pain, no palpitations  Abd: No nausea  Neuro: No headache, no dizziness  No Known Allergies      Objective:  Vital Signs Last 24 Hrs  T(C): 36.7 (29 Oct 2021 11:44), Max: 36.7 (29 Oct 2021 11:44)  T(F): 98 (29 Oct 2021 11:44), Max: 98 (29 Oct 2021 11:44)  HR: 63 (29 Oct 2021 11:44) (63 - 63)  BP: 114/76 (29 Oct 2021 11:44) (114/76 - 114/76)  BP(mean): --  RR: 16 (29 Oct 2021 11:44) (16 - 16)  SpO2: 98% (29 Oct 2021 11:44) (98% - 98%)    CM: SR  Neuro: A&OX3, CN 2-12 intact  HEENT: NC, AT  Lungs: CTA B/L  CV: S1, S2, no murmur, RRR  Abd: Soft  Right Groin: Soft, no bleeding, no hematoma  Extremity: + distal pulses  EKG:   NSR on telemetry                DIAGNOSTICS:   TTE 2018 with LVEF 65%  Stress Test 2018 neg    < from: Cardiac Catheterization (10.29.21 @ 12:53) >  VENTRICLES: There were no left ventricular global or regional wall motion  abnormalities. Global left ventricular function was normal. EF estimated  was 55 %.  VALVES: AORTIC VALVE: The aortic valve was evaluated by left  ventriculography. The aortic valve appeared to be structurally normal. The  aortic valve leaflets exhibited normal thickness and normal excursion.  There was no aortic stenosis. MITRAL VALVE: The mitral valve was evaluated  by left ventriculography. The mitral valve appeared grossly normal. The  mitral leaflets exhibited normal thickness and normal excursion. The  mitral valve exhibited no regurgitation.  CORONARY VESSELS: The coronary circulation is left dominant.  LM:   --LM: Normal. The vessel was normal sized, not calcified, and not  tortuous. Angiography showed no evidence of disease.  LAD:   --  LAD: The vessel was normal sized, not calcified, and not  tortuous. Angiography showed minor luminal irregularities withno flow  limiting lesions.  --  Mid LAD: There was a tubular 20 % stenosis in the middle third of the  vessel segment, just after S1. The lesion was without evidence of  thrombus. There was DELANEY grade 3 flow through the vessel (brisk flow).  --  D1: The vessel was normal sized, not calcified, and not tortuous.  Angiography showed minor luminal irregularities with no flow limiting  lesions. There was a discrete 20 % stenosis at the ostium of the vessel  segment. The lesion was without evidence of thrombus. There was DELANEY grade  3 flow through the vessel (brisk flow).  CX:   --  Circumflex: The vessel was normal sized, not calcified, and not  tortuous. Angiography showed minor luminal irregularities with no flow  limiting lesions.  RCA:   --  RCA: The vessel was small (non-dominant), not calcified, and not  tortuous. Angiography showed no evidence of disease.  COMPLICATIONS: No complications occurred during the cath lab visit.  DIAGNOSTIC IMPRESSIONS: There is no significant coronary artery disease.  Left ventricular function is normal.  LVEF=55%  DIAGNOSTIC RECOMMENDATIONS: The patient should continue with the present  medications. Patient management should include aggressive medical therapy,  close monitoring of BUN and creatinine, resumption of all previous  activities in 2 days, and weight reduction. The patient should follow a  low fat and low calorie diet. Medical management is recommended.  Prepared and signed by  Lasha Larson MD  Signed 10/29/2021 13:52:02    < end of copied text >      ASSESSMENT AND PLAN:     52yo male with h/o HTN, HLD, strong FH premature CAD, LLE DVT (??year not on A/C), hypothyroid, obesity, DIANE (***?CPAP), latent TB, HSV, GERD, BPH with obstructive uropathy, erectile dysfunction (PRN Viagra), cervical radiculopathy, PTSD, anxiety, ADD, followed by Dr. Ge, known abnormal ECG and CCTA 9/2020 with  (LMCA-0, LAD-165, RI-0, LCx-0, RCA-14), pLAD 25-49% and pRCA <25% stenosis, experiencing worsening and more frequent episodes of exertional left-sided CP and ASHBY, 2 recent ED presentations ([place] with neg CE and refused adm and [facility] and eloped due to wait time in ED), now presents for LHC +/- PCI to be performed by Dr. Larson.       -post LHC which showed no significant coronary artery disease; LVEF 55%  -NSR on telemetry  -right radial artery approach; radial band in place  -RRA with palpable pulses, no ecchymosis or hematoma, no tingling or numbness  -continue current medical management  -Discussed therapeutic lifestyle changes to reduce risk factors such as following a cardiac diet, weight loss, maintaining a healthy weight, exercise, smoking cessation, medication compliance, and regular follow-up  with MD to know your numbers (BP, cholesterol, weight, and glucose)  -patient not a candidate for cardiac rehab secondary to: found with no significant coronary artery disease

## 2021-10-29 NOTE — DISCHARGE NOTE PROVIDER - NSDCCPCAREPLAN_GEN_ALL_CORE_FT
PRINCIPAL DISCHARGE DIAGNOSIS  Diagnosis: Chest pain  Assessment and Plan of Treatment: Patietn with history of HTN, HLD, strong famiily history of premature CAD, LLE DVT (??year not on A/C), hypothyroid, obesity, DIANE (***?CPAP), latent TB, HSV, GERD, BPH with obstructive uropathy, erectile dysfunction (PRN Viagra), cervical radiculopathy, PTSD, anxiety, ADD, followed by Dr. Ge, known abnormal ECG and CCTA 9/2020 with  (LMCA-0, LAD-165, RI-0, LCx-0, RCA-14), pLAD 25-49% and pRCA <25% stenosis, experiencing worsening and more frequent episodes of exertional left-sided CP and ASHBY, 2 recent ED presentations ([place] with neg CE and refused adm and [facility] and eloped due to wait time in ED), now presents for LHC +/- PCI to be performed by Dr. Larson.

## 2021-10-29 NOTE — DISCHARGE NOTE PROVIDER - NSDCCPTREATMENT_GEN_ALL_CORE_FT
PRINCIPAL PROCEDURE  Procedure: Catheterization, heart, left  Findings and Treatment: Patient now status post left heart catheterization via right radial artery which showed normal coronaries. Continue current medical management. Follow with Dr. MARLYN Ge in 1-2 weeks.

## 2021-10-29 NOTE — DISCHARGE NOTE NURSING/CASE MANAGEMENT/SOCIAL WORK - PATIENT PORTAL LINK FT
You can access the FollowMyHealth Patient Portal offered by [facility] by registering at the following website: [email or web address]. By joining [facility]’s FollowMyHealth portal, you will also be able to view your health information using other applications (apps) compatible with our system.

## 2021-10-29 NOTE — DISCHARGE NOTE PROVIDER - NSDCMRMEDTOKEN_GEN_ALL_CORE_FT
Ambien 10 mg oral tablet: 1 tab(s) orally once a day (at bedtime)  aspirin 81 mg oral delayed release capsule:  orally   atorvastatin 40 mg oral tablet: 1 tab(s) orally once a day  losartan-hydrochlorothiazide 100 mg-25 mg oral tablet: 1 tab(s) orally once a day  Multiple Vitamins oral tablet: 1 tab(s) orally once a day  Omega-3 oral capsule:   Synthroid 50 mcg (0.05 mg) oral tablet: 1 tab(s) orally once a day  Xanax 0.25 mg oral tablet:    Ambien 10 mg oral tablet: 1 tab(s) orally once a day (at bedtime)  aspirin 81 mg oral delayed release capsule:  orally   losartan-hydrochlorothiazide 100 mg-25 mg oral tablet: 1 tab(s) orally once a day  Multiple Vitamins oral tablet: 1 tab(s) orally once a day  Omega-3 oral capsule:   rosuvastatin 40 mg oral capsule: 1 cap(s) orally once a day   Synthroid 50 mcg (0.05 mg) oral tablet: 1 tab(s) orally once a day  Xanax 0.25 mg oral tablet:

## 2021-10-29 NOTE — DISCHARGE NOTE PROVIDER - HOSPITAL COURSE
50yo male with h/o HTN, HLD, strong FH premature CAD, LLE DVT (??year not on A/C), hypothyroid, obesity, DIANE (***?CPAP), latent TB, HSV, GERD, BPH with obstructive uropathy, erectile dysfunction (PRN Viagra), cervical radiculopathy, PTSD, anxiety, ADD, followed by Dr. Ge, known abnormal ECG and CCTA 9/2020 with  (LMCA-0, LAD-165, RI-0, LCx-0, RCA-14), pLAD 25-49% and pRCA <25% stenosis, experiencing worsening and more frequent episodes of exertional left-sided CP and ASHBY, 2 recent ED presentations ([place] with neg CE and refused adm and [facility] and eloped due to wait time in ED), now presents for C +/- PCI to be performed by Dr. Larson.     Patient now status post left heart catheterization via right radial artery which showed normal coronaries. Continue current medical management. Follow with Dr. MARLYN Ge in 1-2 weeks.     DIAGNOSTIC IMPRESSIONS: There is no significant coronary artery disease.  Left ventricular function is normal.  LVEF=55%

## 2021-10-29 NOTE — DISCHARGE NOTE NURSING/CASE MANAGEMENT/SOCIAL WORK - NSDCFUADDAPPT_GEN_ALL_CORE_FT
Please call and make an appointment with Dr. MARLYN Ge in 1-2 weeks for post procedure hospital follow up.

## 2021-11-12 ENCOUNTER — APPOINTMENT (OUTPATIENT)
Dept: CARDIOLOGY | Facility: CLINIC | Age: 51
End: 2021-11-12

## 2021-11-22 ENCOUNTER — NON-APPOINTMENT (OUTPATIENT)
Age: 51
End: 2021-11-22

## 2021-11-24 ENCOUNTER — NON-APPOINTMENT (OUTPATIENT)
Age: 51
End: 2021-11-24

## 2021-12-01 ENCOUNTER — RX RENEWAL (OUTPATIENT)
Age: 51
End: 2021-12-01

## 2021-12-03 ENCOUNTER — APPOINTMENT (OUTPATIENT)
Dept: FAMILY MEDICINE | Facility: CLINIC | Age: 51
End: 2021-12-03
Payer: COMMERCIAL

## 2021-12-03 PROCEDURE — 99214 OFFICE O/P EST MOD 30 MIN: CPT | Mod: 25

## 2021-12-03 PROCEDURE — 36415 COLL VENOUS BLD VENIPUNCTURE: CPT

## 2021-12-05 LAB
25(OH)D3 SERPL-MCNC: 43.4 NG/ML
ALBUMIN SERPL ELPH-MCNC: 5.1 G/DL
ALP BLD-CCNC: 92 U/L
ALT SERPL-CCNC: 52 U/L
ANION GAP SERPL CALC-SCNC: 14 MMOL/L
AST SERPL-CCNC: 28 U/L
BASOPHILS # BLD AUTO: 0.06 K/UL
BASOPHILS NFR BLD AUTO: 1 %
BILIRUB SERPL-MCNC: 0.5 MG/DL
BUN SERPL-MCNC: 15 MG/DL
CALCIUM SERPL-MCNC: 9.8 MG/DL
CHLORIDE SERPL-SCNC: 100 MMOL/L
CHOLEST SERPL-MCNC: 119 MG/DL
CO2 SERPL-SCNC: 24 MMOL/L
CREAT SERPL-MCNC: 1.26 MG/DL
EOSINOPHIL # BLD AUTO: 0.11 K/UL
EOSINOPHIL NFR BLD AUTO: 1.9 %
ESTIMATED AVERAGE GLUCOSE: 114 MG/DL
GLUCOSE SERPL-MCNC: 100 MG/DL
HBA1C MFR BLD HPLC: 5.6 %
HCT VFR BLD CALC: 50.2 %
HDLC SERPL-MCNC: 36 MG/DL
HGB BLD-MCNC: 16.6 G/DL
IMM GRANULOCYTES NFR BLD AUTO: 0.3 %
LDLC SERPL CALC-MCNC: 60 MG/DL
LH SERPL-ACNC: 1 IU/L
LYMPHOCYTES # BLD AUTO: 2.01 K/UL
LYMPHOCYTES NFR BLD AUTO: 35 %
MAN DIFF?: NORMAL
MCHC RBC-ENTMCNC: 28.7 PG
MCHC RBC-ENTMCNC: 33.1 GM/DL
MCV RBC AUTO: 86.9 FL
MONOCYTES # BLD AUTO: 0.61 K/UL
MONOCYTES NFR BLD AUTO: 10.6 %
NEUTROPHILS # BLD AUTO: 2.93 K/UL
NEUTROPHILS NFR BLD AUTO: 51.2 %
NONHDLC SERPL-MCNC: 83 MG/DL
PLATELET # BLD AUTO: 234 K/UL
POTASSIUM SERPL-SCNC: 4.1 MMOL/L
PROLACTIN SERPL-MCNC: 7.3 NG/ML
PROT SERPL-MCNC: 7.4 G/DL
RBC # BLD: 5.78 M/UL
RBC # FLD: 12.5 %
SODIUM SERPL-SCNC: 139 MMOL/L
TRIGL SERPL-MCNC: 116 MG/DL
WBC # FLD AUTO: 5.74 K/UL

## 2021-12-14 ENCOUNTER — TRANSCRIPTION ENCOUNTER (OUTPATIENT)
Age: 51
End: 2021-12-14

## 2021-12-14 LAB
TESTOST BND SERPL-MCNC: 9.7 PG/ML
TESTOSTERONE TOTAL S: 385 NG/DL

## 2021-12-17 ENCOUNTER — APPOINTMENT (OUTPATIENT)
Dept: FAMILY MEDICINE | Facility: CLINIC | Age: 51
End: 2021-12-17

## 2021-12-18 ENCOUNTER — TRANSCRIPTION ENCOUNTER (OUTPATIENT)
Age: 51
End: 2021-12-18

## 2021-12-28 ENCOUNTER — RX RENEWAL (OUTPATIENT)
Age: 51
End: 2021-12-28

## 2021-12-31 ENCOUNTER — TRANSCRIPTION ENCOUNTER (OUTPATIENT)
Age: 51
End: 2021-12-31

## 2022-01-03 ENCOUNTER — APPOINTMENT (OUTPATIENT)
Dept: FAMILY MEDICINE | Facility: CLINIC | Age: 52
End: 2022-01-03
Payer: COMMERCIAL

## 2022-01-03 PROCEDURE — 99213 OFFICE O/P EST LOW 20 MIN: CPT | Mod: 95

## 2022-01-03 NOTE — PHYSICAL EXAM
[Normal] : no acute distress, well nourished, well developed and well-appearing [Normal Sclera/Conjunctiva] : normal sclera/conjunctiva [No JVD] : no jugular venous distention [No Respiratory Distress] : no respiratory distress  [Speech Grossly Normal] : speech grossly normal [Memory Grossly Normal] : memory grossly normal [de-identified] : Unable to examine due to telehealth visit [de-identified] : sad, tearfull, withdrawn
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
## 2022-02-02 ENCOUNTER — APPOINTMENT (OUTPATIENT)
Dept: FAMILY MEDICINE | Facility: CLINIC | Age: 52
End: 2022-02-02
Payer: COMMERCIAL

## 2022-02-02 VITALS
HEART RATE: 101 BPM | WEIGHT: 230 LBS | TEMPERATURE: 97.3 F | SYSTOLIC BLOOD PRESSURE: 138 MMHG | BODY MASS INDEX: 36.1 KG/M2 | OXYGEN SATURATION: 100 % | HEIGHT: 67 IN | DIASTOLIC BLOOD PRESSURE: 87 MMHG

## 2022-02-02 PROCEDURE — 99213 OFFICE O/P EST LOW 20 MIN: CPT

## 2022-02-02 NOTE — PHYSICAL EXAM
[Normal] : no respiratory distress, lungs were clear to auscultation bilaterally and no accessory muscle use [Speech Grossly Normal] : speech grossly normal [Memory Grossly Normal] : memory grossly normal [Alert and Oriented x3] : oriented to person, place, and time [Normal Insight/Judgement] : insight and judgment were intact [de-identified] : mildly depressed affect

## 2022-02-02 NOTE — HISTORY OF PRESENT ILLNESS
[FreeTextEntry8] : Pt is here for forms to be filled out.\par Did not move to [place] yet. Has not been back to work. Still thinking of moving. Back in the house with his girlfriend.\par They are thinking about going into counseling together.. She has not been drinking\par sad, difficulty concentrating, not realy leaving the house. Increased the lexapro to 20 mg without major side effects. not sure if it has fully kicked in yet. Feels less tearful

## 2022-02-02 NOTE — HEALTH RISK ASSESSMENT
[1] : 1) Little interest or pleasure doing things for several days (1) [2] : 2) Feeling down, depressed, or hopeless for more than half of the days (2) [[id number]] : 2

## 2022-02-18 ENCOUNTER — APPOINTMENT (OUTPATIENT)
Dept: FAMILY MEDICINE | Facility: CLINIC | Age: 52
End: 2022-02-18

## 2022-02-24 ENCOUNTER — APPOINTMENT (OUTPATIENT)
Dept: FAMILY MEDICINE | Facility: CLINIC | Age: 52
End: 2022-02-24

## 2022-02-27 ENCOUNTER — RX RENEWAL (OUTPATIENT)
Age: 52
End: 2022-02-27

## 2022-03-02 ENCOUNTER — TRANSCRIPTION ENCOUNTER (OUTPATIENT)
Age: 52
End: 2022-03-02

## 2022-03-11 NOTE — PATIENT PROFILE ADULT - FALL HARM RISK TYPE OF ASSESSMENT
admission Rifampin Pregnancy And Lactation Text: This medication is Pregnancy Category C and it isn't know if it is safe during pregnancy. It is also excreted in breast milk and should not be used if you are breast feeding.

## 2022-03-16 NOTE — PATIENT PROFILE ADULT - NSPROPOAPRESSUREINJURY_GEN_A_NUR
CHIEF COMPLAINT/HPI: Mirna is a 76 y.o. female for evaluation of her abdominal pain.     Clinical course:  The patient is still having the upper quadrant pain.  She will need the gall bladder ultrasound. She had normal renal function.   Will continue the same medications and will follow in 3 months.      Past Medical History:   Diagnosis Date    Abnormal cervical Papanicolaou smear 1980's    Cryo    Carotid artery stenosis     via kinked carotid artery - followed by Dr. Ye Anderson    Carotid stenosis 5/15/2014    GERD (gastroesophageal reflux disease)     Gout due to renal impairment     Gout, arthritis 2/20/2013    Gout, unspecified     Herniated lumbar intervertebral disc     Hypertension     Insomnia     Irritable bowel syndrome without diarrhea 9/20/2015    Liver lesion 3/13/2013    Mitral valve prolapse     MVP (mitral valve prolapse)     Pancreatic cyst 12/30/2013    PVD (peripheral vascular disease) 12/23/2013    PVD (peripheral vascular disease) 12/23/2013    Venous insufficiency        Mirna reports that she has never smoked. She has never used smokeless tobacco. She reports that she does not drink alcohol and does not use drugs.    Family History   Problem Relation Age of Onset    Diabetes Brother     Hypertension Brother     Diabetes Sister     Eclampsia Sister     Hypertension Sister     Colon polyps Sister     Breast cancer Other         niece    Diabetes Sister     Eclampsia Sister     Hypertension Sister     Miscarriages / Stillbirths Sister     Heart disease Sister         mi, cad    Diabetes Sister     Eclampsia Sister     Hypertension Sister     Heart disease Mother     Diabetes Sister     Endometriosis Daughter     No Known Problems Daughter     No Known Problems Daughter     Colon cancer Neg Hx     Ovarian cancer Neg Hx     Esophageal cancer Neg Hx     Liver cancer Neg Hx     Liver disease Neg Hx     Rectal cancer Neg Hx     Stomach cancer Neg Hx   "      ROS:    General: No fever, chills, change in appetite or weight loss  Skin: No rashes or pruritus  Head: No headaches or recent head trauma  Eyes: No changes in vision or eye pain  Nodes: No swollen glands  Pulmonary: No dyspnea, wheezes, cough or sputum production  Cardiovascular: No chest pain, edema, PND, orthopnea or reduced exercise tolerance  Abdomen: No abdominal pain, nausea, vomiting, constipation or diarrhea  Urinary: No flank pain, dysuria or hematuria  Peripheral Vascular: No claudication or cyanosis  Musculoskeletal: No joint stiffness, swelling or back pain  Neurologic: No history of seizures, tremors, forcal weakness or numbness    PE:    Vitals:    03/16/22 1014   BP: 130/80   Pulse: 73   SpO2: 96%   Weight: 72.1 kg (158 lb 15.2 oz)   Height: 5' 6" (1.676 m)         Labs:  1. CBC  2. CMP  3. UA    Impression and plan:  1. R/O Biliary colic with Gall bladder calculi.   2. Primary HTN with stable BP.   3. Urinary incontinence. Will replace Diovan/HCTZ with plain Diovan 320 mg.   4. Irritable bowel disease.   5. GERD  6. Hyperlipidemia.   7. Venous insufficiency.   Lumbosacral disease.       KRISTINE DUVAL.Fariba. MD. LEA. FACP.  , [employer] / [employer] ([place]  Nephrology Consultant. [facility].   [facility]. 5th [place] [address].    email: [email or web address].  Tel: Office: [phone]        " no

## 2022-03-22 ENCOUNTER — TRANSCRIPTION ENCOUNTER (OUTPATIENT)
Age: 52
End: 2022-03-22

## 2022-03-24 NOTE — ED ADULT NURSE NOTE - NS PRO PASSIVE SMOKE EXP
"Neurosurgery  Follow up    SUBJECTIVE:     Chief Complaint: query normal pressure hydrocephalus     History of Present Illness:  "Narciso Yanez is a 83 y.o. male smoker with below listed PMH who presents with recent increase in falls as a referral from pcp for lumbar stenosis. Lumbar MRI in 3/2020 revealed very mild lumbar stenosis. He has a history of back and right leg pain that has been treated with LEAH and PT up until this point. He presents today with his two daughters. They report periodic falls last year, 3 at the most. However, he has been falling several times per week since Christmas 2020. He was driving and walking independently last year. He can no longer drive and must use a walker (most of the time) for ambulation now. They endorse head trauma on at least one fall but cannot recall a specific fall that precipitated his worsened condition.      "He reports right leg pain for at least 15 months. Pain is present in his low back and radiates down his posterior right leg. Pain increases with prolonged standing, sitting, and walking. Sitting down relieves the pain as long as he doesn't sit too long. He further endorses numbness in his posterior thigh, stopping at the knee. He feels like his right leg is weak and often contributes to the falls.      "It is not clear if he has an underlying gait disturbance unrelated to his right leg symptoms. He feels like he loses his balance without much warning. He uses his walker most days but tries to avoid it on "good days". We discussed the danger of waiting for a fall to determine whether a day is good or bad.      "He denies arm pain and weakness. Denies neck pain. Endorses a several year history of numbness in the first 3 fingers of his right hand. More recently he has been dropping items from his hands, most notably his cigarettes if he is not paying attention. He has chronic urge urinary incontinence. As far as he can tell, he is emptying his bladder when he " "uses the restroom. Newer onset of constipation.      "Chronic everyday smoker. Takes ASA81 and plavix daily.   Diabetic, CKD3  CAD s/p CABG (1999)  PAD"     As of 5/17/21, the patient is referred to see me (after having seen Jennifer Kaur PA-C as above) as a referral for possible NPH per Thuy Perez PA-C. The patient reports that as of a year ago, he was able to climb in and out of 18-wheelers without issue; he has had a relatively rapid decline over that time. He now has urinary incontinence because he cannot make it to the bathroom in time (improved with oxybutynin). He is falling frequently, about once or twice a week.     He has had multiple epidurals, but these have not been of significant help. He has been falling frequently, most recently Wednesday.      He has had some confusion, but not too bad. More trouble with short-term memory. Usually ok once he "gathers his thoughts"     He lives with youngest daughter, but it is an older daughter who is with him today and brings him to appointments.     No bleeding, infection, or anesthetic complications with any of his previous surgeries.     MRI of the brain is personally reviewed and demonstrates ventriculomegaly with mild transependymal flow.     Of note, he is completely uninterested in smoking cessation     As of 5/27/21, the patient underwent 30cc lumbar puncture yesterday. PT evaluation showed objective improvement in 3 out of 4 measures yesterday. His daughter reports that he was also more talkative than usual after the LP. He remains off of ASA and Plavix.     As of 6/24/21, the patient underwent placement of  shunt on 5/31/21. Etelvina reports that he has been cognitively much improved since the shunt was placed; however, since Saturday, he has had significant daily headache and vomiting. CT head and shunt series obtained prior to today's appointment are personally reviewed and demonstrate interval improvement in 3rd ventricular size without " "apparent overlying hygroma or hematoma. No ileus on shunt series. He has been having regular BM, he has not been smoking. Etelvina and Mr. Yanez do endorse unintentional weight loss over the past few months. His walls has been DCed.     As of 7/1/21, the patient reports that his nausea has completely resolved since reprogramming. He has been discharged home. His daughter is pleased with how he is doing, but thinks that his mental status is not quite as brisk as it was when the shunt was draining more. He also has what appears to be cellulitis of the right great toe. He is scheduled to see podiatry next week.     As of 9/20/21, the patient's daughter reports that the dizziness and emesis were present well before the shunt and have not significantly worsened since. They have pursued workup with multiple ENT without significant improvement. His daughter feels that he was doing much better when the shunt was draining. He has worse bruising on his arms today. He has also obviously lost weight. They are planning to see the dermatologist.     His CT head does not demonstrate any concern for blood or extraaxial collection     As of 11/18/21, the patient reports that he had a 50% improvement in symptoms, though he has had some worsening again over the past 2 weeks.     He still has decreased appetite and fluid. The dizziness was coming from his "bathroom" medications and has resolved since stopping them.     He is currently in the process of getting hearing aids s/p ruptured eardrum.     As of 12/30/21, the patient has been having rapid hearing loss. Over 2 months, he has had significant loss in hearing. He is accompanied today by his daughter Norah, who wears hearing aids herself.     He has gained about 2 lbs.     As of 2/3/22, the patient is now using a hearing aid, which is helpful. There has been no other etiology identified for his hearing loss. He had some nausea (attributed to stomach virus) and coughing during " "the week. He is reportedly covid negative and eager to return to work.     He feels well enough to try shunt adjustment today.     As of 3/24/22, the patient presents after an ED visit yesterday. He reports several falls and dizzy spells. He has a right forearm injury which was cleaned and stapled, then wrapped in coban. He has an additional wound on his left elbow. He is accompanied by his grandson, who reports that he was prescribed Valium, and that he was "popping them like candy." The grandson states that he took the Valium and flushed them down the toilet. He states that he has been taking them for years, but that he does not daily them on a daily basis. He denies new-onset tremor, confusion, hypertension, HA, vision/hearing changes, dysphagia/dysphonia, nausea/vomiting, weakness/sensory change, bowel/bladder changes.      CT head demonstrates an interval decrease in ventricular size and small bilateral subdural hygromas     Review of patient's allergies indicates:   Allergen Reactions    Demerol [meperidine] Nausea Only       Current Outpatient Medications   Medication Sig Dispense Refill    ACCU-CHEK JOAQUIN PLUS METER Misc Three times a day with meals  0    ACCU-CHEK SOFTCLIX LANCETS Misc Check tid 200 each 12    acetaminophen (TYLENOL) 325 MG tablet Take 2 tablets (650 mg total) by mouth every 4 (four) hours as needed for Pain.  0    albuterol-ipratropium (DUO-NEB) 2.5 mg-0.5 mg/3 mL nebulizer solution Take 3 mLs by nebulization every 4 (four) hours as needed for Wheezing. 9 mL 12    alendronate (FOSAMAX) 70 MG tablet Take 1 tablet (70 mg total) by mouth every 7 days. Patient takes on Tuesdays 4 tablet 11    alendronate (FOSAMAX) 70 MG tablet Take 1 tablet (70 mg total) by mouth every 7 days. Patient takes on Tuesdays 4 tablet 11    ascorbic acid, vitamin C, (VITAMIN C) 250 MG tablet Take 1 tablet (250 mg total) by mouth once daily. 120 tablet 0    aspirin (ECOTRIN) 81 MG EC tablet Take 1 tablet (81 " mg total) by mouth once daily.  0    blood sugar diagnostic (TRUE METRIX GLUCOSE TEST STRIP) Strp  strip 12    blood sugar diagnostic (TRUE METRIX GLUCOSE TEST STRIP) Strp  each 12    ciclopirox (PENLAC) 8 % Soln Apply topically nightly. 6.6 mL 11    clopidogreL (PLAVIX) 75 mg tablet Take 1 tablet (75 mg total) by mouth once daily. 30 tablet 12    diazePAM (VALIUM) 5 MG tablet Take 1 tablet (5 mg total) by mouth every 8 (eight) hours as needed. 270 tablet 5    heparin sodium,porcine (HEPARIN, PORCINE,) 5,000 unit/mL injection Inject 1 mL (5,000 Units total) into the skin every 8 (eight) hours.      insulin aspart U-100 (NOVOLOG) 100 unit/mL (3 mL) InPn pen Inject 0-5 Units into the skin before meals and at bedtime as needed (Hyperglycemia).  0    magnesium sulfate in water (MAGNESIUM SULFATE 2 GRAM/50 ML) 2 gram/50 mL PgBk       meclizine (ANTIVERT) 25 mg tablet Take 1 tablet (25 mg total) by mouth 3 (three) times daily as needed for Dizziness (or at least one HS for 1 week when vertigo active). 30 tablet 12    metFORMIN (GLUCOPHAGE-XR) 750 MG ER 24hr tablet TAKE 1 TABLET TWICE DAILY WITH MEALS 180 tablet 12    methocarbamoL (ROBAXIN) 500 MG Tab TAKE 1 TABLET THREE TIMES DAILY AS NEEDED FOR SPASM 40 tablet 1    metoprolol succinate (TOPROL-XL) 25 MG 24 hr tablet       nicotine (NICODERM CQ) 14 mg/24 hr Place 1 patch onto the skin once daily.  0    OMNIPAQUE 300 300 mg iodine/mL injection       OMNIPAQUE 350 350 mg iodine/mL Soln injection       ondansetron (ZOFRAN-ODT) 4 MG TbDL Take 2 tablets (8 mg total) by mouth every 8 (eight) hours as needed (nausea). 90 tablet 0    ondansetron (ZOFRAN-ODT) 8 MG TbDL       prochlorperazine (COMPAZINE) 5 MG tablet       rosuvastatin (CRESTOR) 20 MG tablet Take 1 tablet (20 mg total) by mouth every evening. (Patient taking differently: Take 20 mg by mouth once daily.) 90 tablet 12    senna (SENOKOT) 8.6 mg tablet Take 1 tablet by mouth once  daily.      tamsulosin (FLOMAX) 0.4 mg Cap Take 1 capsule (0.4 mg total) by mouth once daily. 90 capsule 11     Current Facility-Administered Medications   Medication Dose Route Frequency Provider Last Rate Last Admin    testosterone cypionate injection 400 mg  400 mg Intramuscular Q28 Days Marcelino Del Toro MD           Past Medical History:   Diagnosis Date    Actinic keratosis     Anxiety     B12 deficiency 12/8/2014    Dx 6/14    Cancer     skin    Cataract     Coronary artery disease     Diabetes mellitus type II     Diabetes mellitus with peripheral circulatory disorder 6/18/2014    ED (erectile dysfunction)     Hyperlipidemia     Kidney stone     Peripheral vascular disease     Spondylosis 3/29/2019    Tobacco dependence     Urinary incontinence 5/4/2021     Past Surgical History:   Procedure Laterality Date    APPENDECTOMY      CARDIAC SURGERY  01/1999    CABG 4 vessels    CATARACT EXTRACTION      EPIDURAL STEROID INJECTION Right 8/26/2020    Procedure: Injection, Steroid, Epidural Transforaminal;  Surgeon: Wiley Crane Jr., MD;  Location: [facility] ENDO;  Service: Pain Management;  Laterality: Right;  Right L5 + S1 TF LEAH  Arrive @ 1115; ASA & Plavix last 8/18; Check BG; Rapid COVID test    EPIDURAL STEROID INJECTION Right 11/25/2020    Procedure: Injection, Steroid, Epidural Transformainal;  Surgeon: Wiley Crane Jr., MD;  Location: [facility] ENDO;  Service: Pain Management;  Laterality: Right;  Right L5 + S1 TF LEAH  Arrive @ 1245; ASA and Plavix last 11/17; Pre-DM; Needs MD Sign.    EPIDURAL STEROID INJECTION Right 2/19/2021    Procedure: Injection, Steroid, Epidural Transforaminal;  Surgeon: Wiley Crane Jr., MD;  Location: [facility] ENDO;  Service: Pain Management;  Laterality: Right;  Right L5 + S1 TF LEAH  Arrive @ 1115; ASA & Plavix last 2/11; Check BG; Needs Consent    EXTERNAL EAR SURGERY      EYE SURGERY      cataracts    ILIAC ARTERY STENT Bilateral 06/2003     also Right External Iliac stent     Family History     Problem Relation (Age of Onset)    Stroke Mother        Social History     Socioeconomic History    Marital status:    Tobacco Use    Smoking status: Current Every Day Smoker     Packs/day: 1.50     Years: 71.00     Pack years: 106.50     Types: Cigarettes    Smokeless tobacco: Former User   Substance and Sexual Activity    Alcohol use: No    Drug use: No    Sexual activity: Not Currently     Partners: Female   Social History Narrative    .  4 children.  Smokes 2 ppd.  Still drives trucks for entertainment industry.        Review of Systems    Constitutional: Negative for chills, fever and malaise/fatigue.   HENT: Negative for hearing loss.    Eyes: Negative for blurred vision and double vision.   Respiratory: Negative for cough, shortness of breath and stridor.    Cardiovascular: Negative for chest pain and leg swelling.   Gastrointestinal: Negative for constipation, diarrhea. Positive for emesis.    Genitourinary: Negative for urgency. Negative for frequency.   Musculoskeletal: Positive for falls.   Skin: Positive for wound.   Neurological:  Positive for vertigo.Negative for tremors, loss of consciousness and weakness.   Psychiatric/Behavioral: Negative for hallucinations and memory loss.         OBJECTIVE:     Vital Signs  Pulse: 87  BP: 122/77  There is no height or weight on file to calculate BMI.    General: well developed, well nourished, no distress.   Head: normocephalic, atraumatic  Neurologic: Alert and oriented. Thought content appropriate.  GCS: Motor: 6/Verbal: 5/Eyes: 4 GCS Total: 15  Mental Status: Awake, Alert, Oriented x 4  Language: No aphasia  Speech: No dysarthria  Cranial nerves: face symmetric, tongue midline, CN II-XII grossly intact.   Eyes: pupils equal, round, reactive to light with accommodation, EOMI.   Pulmonary: normal respirations, no signs of respiratory distress  Abdomen: soft, non-distended, not tender to  "palpation. Abdominal incisions well healed.  Skin: Right forearm wrapped in Coban. There is a ventral dermal avulsion approximately 4x4cm in a V-shape. There are staples along the long axes  Left elbow with abrasion.   Sensory: intact to light touch throughout   Motor Strength: Moves all extremities spontaneously with good tone.  Full strength upper and lower extremities. No abnormal movements seen.    Strength   Deltoids Triceps Biceps Wrist Extension Wrist Flexion Hand    Upper: R 5/5 5/5 5/5 5/5 5/5 5/5     L 5/5 5/5 5/5 5/5 5/5 5/5       Iliopsoas Quadriceps Knee  Flexion Tibialis  anterior Gastro- cnemius EHL   Lower: R 5/5 5/5 5/5 5/5 5/5 5/5     L 5/5 5/5 5/5 5/5 5/5 5/5         Cerebellar:   Finger-to-nose: intact bilaterally   Pronator drift: absent bilaterally  Cranial incision: well healed, no signs of erythema, edema, or drainage. No underlying fluid collection palpable.       Diagnostic Results:  Imaging reviewed   CTH demonstrates interval placement of right posterior approach  shunt. There are small hygromas apparent anterior to the frontal lobes.     ASSESSMENT/PLAN:     Narciso Yanez is a 84 y.o. male who presents as a referral from [facility] for consideration of possible NPH. He is now s/p  shunt on 5/31/21. His daughter was pleased with the "remarkable" improvement in his cognition. His nausea and vomiting have now resolved since changing medications. Since his last visit, he has developed bifrontal hygromas. Additionally, he has been taking Valium, which may be contributing to his falls and vertigo. His son has since flushed these medications. He does not appear to be experiencing any discontinuation syndrome. I reinforced the importance of avoiding, or at least minimizing medications included within the BEERS criteria, and the patient and grandson voice understanding.     I will plan to dial up his shunt to 200 to decrease the hygromas. We will check a skull xray afterwards to " ensure that it is at the correct setting. I will plan to see him back in 2 weeks with repeat CT head to ensure resolution and then again about 6 weeks later to assess dialing him back down at that time.     Lastly, I have examined and re-dressed his right forearm incision with gauze and tape to minimize tissue injury from ischemia. I reinforced the importance of having regular follow-up to ensure that the wound is healing appropriately. I examined and re-dressed his left elbow abrasion too.     I have encouraged them to contact the clinic in interim with any questions, concerns, or adverse clinical change.     This note reflects the work of Alex Mustafa MD, PGY-1, who examined the patient together with me and helped in writing the note                    No

## 2022-04-18 ENCOUNTER — APPOINTMENT (OUTPATIENT)
Dept: PSYCHIATRY | Facility: CLINIC | Age: 52
End: 2022-04-18

## 2022-04-20 ENCOUNTER — APPOINTMENT (OUTPATIENT)
Dept: PSYCHIATRY | Facility: CLINIC | Age: 52
End: 2022-04-20
Payer: SELF-PAY

## 2022-04-20 PROCEDURE — 90832 PSYTX W PT 30 MINUTES: CPT | Mod: 95

## 2022-04-25 ENCOUNTER — APPOINTMENT (OUTPATIENT)
Dept: PSYCHIATRY | Facility: CLINIC | Age: 52
End: 2022-04-25

## 2022-05-11 ENCOUNTER — NON-APPOINTMENT (OUTPATIENT)
Age: 52
End: 2022-05-11

## 2022-05-17 ENCOUNTER — NON-APPOINTMENT (OUTPATIENT)
Age: 52
End: 2022-05-17

## 2022-05-24 NOTE — ED ADULT NURSE NOTE - NSFALLRSKINDICATORS_ED_ALL_ED
walked in with c/o  fall last  friday c/o  rt ankle pain denies any LOC and denies any head trauma nor injury
no

## 2022-06-07 ENCOUNTER — NON-APPOINTMENT (OUTPATIENT)
Age: 52
End: 2022-06-07

## 2022-06-17 ENCOUNTER — NON-APPOINTMENT (OUTPATIENT)
Age: 52
End: 2022-06-17

## 2022-07-08 ENCOUNTER — APPOINTMENT (OUTPATIENT)
Dept: FAMILY MEDICINE | Facility: CLINIC | Age: 52
End: 2022-07-08

## 2022-07-08 VITALS
BODY MASS INDEX: 36.02 KG/M2 | HEART RATE: 87 BPM | DIASTOLIC BLOOD PRESSURE: 88 MMHG | OXYGEN SATURATION: 98 % | TEMPERATURE: 98 F | SYSTOLIC BLOOD PRESSURE: 130 MMHG | WEIGHT: 230 LBS

## 2022-07-08 PROCEDURE — 36415 COLL VENOUS BLD VENIPUNCTURE: CPT

## 2022-07-08 PROCEDURE — 99214 OFFICE O/P EST MOD 30 MIN: CPT | Mod: 25

## 2022-07-08 RX ORDER — ONDANSETRON 4 MG/1
4 TABLET ORAL EVERY 8 HOURS
Qty: 30 | Refills: 0 | Status: COMPLETED | COMMUNITY
Start: 2022-07-08 | End: 2022-07-18

## 2022-07-08 RX ORDER — ESCITALOPRAM OXALATE 20 MG/1
20 TABLET ORAL
Qty: 90 | Refills: 0 | Status: DISCONTINUED | COMMUNITY
Start: 2021-06-26 | End: 2022-07-08

## 2022-07-08 NOTE — HISTORY OF PRESENT ILLNESS
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

## 2022-07-11 LAB
ALBUMIN SERPL ELPH-MCNC: 4.9 G/DL
ALP BLD-CCNC: 104 U/L
ALT SERPL-CCNC: 47 U/L
ANION GAP SERPL CALC-SCNC: 13 MMOL/L
AST SERPL-CCNC: 30 U/L
BASOPHILS # BLD AUTO: 0.04 K/UL
BASOPHILS NFR BLD AUTO: 0.7 %
BILIRUB SERPL-MCNC: 0.3 MG/DL
BUN SERPL-MCNC: 18 MG/DL
CALCIUM SERPL-MCNC: 9.7 MG/DL
CHLORIDE SERPL-SCNC: 101 MMOL/L
CHOLEST SERPL-MCNC: 113 MG/DL
CO2 SERPL-SCNC: 27 MMOL/L
CREAT SERPL-MCNC: 1.36 MG/DL
EGFR: 63 ML/MIN/1.73M2
EOSINOPHIL # BLD AUTO: 0.14 K/UL
EOSINOPHIL NFR BLD AUTO: 2.3 %
GLUCOSE SERPL-MCNC: 118 MG/DL
HCT VFR BLD CALC: 46.2 %
HDLC SERPL-MCNC: 30 MG/DL
HGB BLD-MCNC: 15.7 G/DL
IMM GRANULOCYTES NFR BLD AUTO: 0.8 %
LDLC SERPL CALC-MCNC: 42 MG/DL
LYMPHOCYTES # BLD AUTO: 2.41 K/UL
LYMPHOCYTES NFR BLD AUTO: 39.9 %
MAN DIFF?: NORMAL
MCHC RBC-ENTMCNC: 28.1 PG
MCHC RBC-ENTMCNC: 34 GM/DL
MCV RBC AUTO: 82.6 FL
MONOCYTES # BLD AUTO: 0.63 K/UL
MONOCYTES NFR BLD AUTO: 10.4 %
NEUTROPHILS # BLD AUTO: 2.77 K/UL
NEUTROPHILS NFR BLD AUTO: 45.9 %
NONHDLC SERPL-MCNC: 83 MG/DL
PLATELET # BLD AUTO: 266 K/UL
POTASSIUM SERPL-SCNC: 4.3 MMOL/L
PROT SERPL-MCNC: 7.2 G/DL
RBC # BLD: 5.59 M/UL
RBC # FLD: 13.3 %
SODIUM SERPL-SCNC: 140 MMOL/L
T3FREE SERPL-MCNC: 3.34 PG/ML
T4 FREE SERPL-MCNC: 1.5 NG/DL
TESTOST SERPL-MCNC: 687 NG/DL
TRIGL SERPL-MCNC: 204 MG/DL
TSH SERPL-ACNC: 2.26 UIU/ML
WBC # FLD AUTO: 6.04 K/UL

## 2022-07-12 ENCOUNTER — TRANSCRIPTION ENCOUNTER (OUTPATIENT)
Age: 52
End: 2022-07-12

## 2022-07-12 DIAGNOSIS — J30.9 ALLERGIC RHINITIS, UNSPECIFIED: ICD-10-CM

## 2022-07-12 LAB
ESTIMATED AVERAGE GLUCOSE: 126 MG/DL
HBA1C MFR BLD HPLC: 6 %

## 2022-07-13 ENCOUNTER — TRANSCRIPTION ENCOUNTER (OUTPATIENT)
Age: 52
End: 2022-07-13

## 2022-07-14 ENCOUNTER — TRANSCRIPTION ENCOUNTER (OUTPATIENT)
Age: 52
End: 2022-07-14

## 2022-07-14 RX ORDER — LANCETS
EACH MISCELLANEOUS
Qty: 1 | Refills: 0 | Status: ACTIVE | COMMUNITY
Start: 2022-07-14 | End: 1900-01-01

## 2022-07-14 RX ORDER — BLOOD SUGAR DIAGNOSTIC
STRIP MISCELLANEOUS
Qty: 1 | Refills: 2 | Status: ACTIVE | COMMUNITY
Start: 2022-07-14 | End: 1900-01-01

## 2022-07-29 ENCOUNTER — APPOINTMENT (OUTPATIENT)
Dept: FAMILY MEDICINE | Facility: CLINIC | Age: 52
End: 2022-07-29

## 2022-07-29 PROCEDURE — 99214 OFFICE O/P EST MOD 30 MIN: CPT | Mod: 95

## 2022-07-29 NOTE — PHYSICAL EXAM
[Normal] : no acute distress, well nourished, well developed and well-appearing [Normal Sclera/Conjunctiva] : normal sclera/conjunctiva [Normal Outer Ear/Nose] : the outer ears and nose were normal in appearance [No JVD] : no jugular venous distention [No Respiratory Distress] : no respiratory distress  [de-identified] : Unable to examine due to telehealth visit

## 2022-07-29 NOTE — HISTORY OF PRESENT ILLNESS
[Home] : at home, [unfilled] , at the time of the visit. [Medical Office: ([facility])___] : at the medical office located in  [Verbal consent obtained from patient] : the patient, [unfilled] [FreeTextEntry4] : Carlita Shelton [FreeTextEntry6] : Pt with bouts of anxiety. When he gets it he feels debilitated/

## 2022-10-03 ENCOUNTER — APPOINTMENT (OUTPATIENT)
Dept: FAMILY MEDICINE | Facility: CLINIC | Age: 52
End: 2022-10-03

## 2022-10-28 ENCOUNTER — APPOINTMENT (OUTPATIENT)
Dept: FAMILY MEDICINE | Facility: CLINIC | Age: 52
End: 2022-10-28

## 2022-10-28 VITALS
HEART RATE: 76 BPM | DIASTOLIC BLOOD PRESSURE: 82 MMHG | TEMPERATURE: 98.1 F | SYSTOLIC BLOOD PRESSURE: 162 MMHG | WEIGHT: 230 LBS | OXYGEN SATURATION: 98 % | BODY MASS INDEX: 36.1 KG/M2 | HEIGHT: 67 IN

## 2022-10-28 DIAGNOSIS — G47.00 INSOMNIA, UNSPECIFIED: ICD-10-CM

## 2022-10-28 PROCEDURE — 36415 COLL VENOUS BLD VENIPUNCTURE: CPT

## 2022-10-28 PROCEDURE — 99214 OFFICE O/P EST MOD 30 MIN: CPT | Mod: 25

## 2022-10-28 PROCEDURE — G0008: CPT

## 2022-10-28 PROCEDURE — 90686 IIV4 VACC NO PRSV 0.5 ML IM: CPT

## 2022-10-28 NOTE — HISTORY OF PRESENT ILLNESS
[Depression] : Depression [Hyperlipidemia] : Hyperlipidemia [Hypertension] : Hypertension [Other: ___] : [unfilled] [FreeTextEntry6] : Pt is here for med renewal and blood work.\par Doing well on ADD Meds. No side effects. No weight gain. No weight loss. No excessive jitteriness. No difficulties. Working on level of concentration\par

## 2022-10-29 LAB
ALBUMIN SERPL ELPH-MCNC: 4.8 G/DL
ALP BLD-CCNC: 97 U/L
ALT SERPL-CCNC: 43 U/L
ANION GAP SERPL CALC-SCNC: 13 MMOL/L
AST SERPL-CCNC: 23 U/L
BASOPHILS # BLD AUTO: 0.06 K/UL
BASOPHILS NFR BLD AUTO: 1.1 %
BILIRUB SERPL-MCNC: 0.4 MG/DL
BUN SERPL-MCNC: 15 MG/DL
CALCIUM SERPL-MCNC: 9.8 MG/DL
CHLORIDE SERPL-SCNC: 103 MMOL/L
CHOLEST SERPL-MCNC: 123 MG/DL
CO2 SERPL-SCNC: 27 MMOL/L
CREAT SERPL-MCNC: 1.29 MG/DL
EGFR: 67 ML/MIN/1.73M2
EOSINOPHIL # BLD AUTO: 0.08 K/UL
EOSINOPHIL NFR BLD AUTO: 1.4 %
ESTIMATED AVERAGE GLUCOSE: 126 MG/DL
GLUCOSE SERPL-MCNC: 120 MG/DL
HBA1C MFR BLD HPLC: 6 %
HCT VFR BLD CALC: 47.6 %
HDLC SERPL-MCNC: 38 MG/DL
HGB BLD-MCNC: 15.9 G/DL
IMM GRANULOCYTES NFR BLD AUTO: 0.2 %
LDLC SERPL CALC-MCNC: 66 MG/DL
LYMPHOCYTES # BLD AUTO: 1.86 K/UL
LYMPHOCYTES NFR BLD AUTO: 33.4 %
MAN DIFF?: NORMAL
MCHC RBC-ENTMCNC: 28.2 PG
MCHC RBC-ENTMCNC: 33.4 GM/DL
MCV RBC AUTO: 84.5 FL
MONOCYTES # BLD AUTO: 0.43 K/UL
MONOCYTES NFR BLD AUTO: 7.7 %
NEUTROPHILS # BLD AUTO: 3.13 K/UL
NEUTROPHILS NFR BLD AUTO: 56.2 %
NONHDLC SERPL-MCNC: 85 MG/DL
PLATELET # BLD AUTO: 223 K/UL
POTASSIUM SERPL-SCNC: 4.9 MMOL/L
PROT SERPL-MCNC: 7 G/DL
RBC # BLD: 5.63 M/UL
RBC # FLD: 13.7 %
SODIUM SERPL-SCNC: 143 MMOL/L
T3FREE SERPL-MCNC: 2.92 PG/ML
T4 FREE SERPL-MCNC: 1.4 NG/DL
TESTOST SERPL-MCNC: 141 NG/DL
TRIGL SERPL-MCNC: 97 MG/DL
TSH SERPL-ACNC: 1.82 UIU/ML
WBC # FLD AUTO: 5.57 K/UL

## 2022-11-03 ENCOUNTER — TRANSCRIPTION ENCOUNTER (OUTPATIENT)
Age: 52
End: 2022-11-03

## 2022-11-25 ENCOUNTER — NON-APPOINTMENT (OUTPATIENT)
Age: 52
End: 2022-11-25

## 2022-11-28 ENCOUNTER — APPOINTMENT (OUTPATIENT)
Dept: FAMILY MEDICINE | Facility: CLINIC | Age: 52
End: 2022-11-28

## 2022-11-28 PROCEDURE — 99213 OFFICE O/P EST LOW 20 MIN: CPT | Mod: 95

## 2022-11-28 RX ORDER — BENZONATATE 100 MG/1
100 CAPSULE ORAL EVERY 8 HOURS
Qty: 40 | Refills: 0 | Status: COMPLETED | COMMUNITY
Start: 2022-11-28 | End: 2022-12-05

## 2022-11-28 NOTE — PLAN
[FreeTextEntry1] : Advised at length regarding warning signs of severe COVID-19 infection complications. Advised to call office or go to ED immediately if develops SOB, Dyspnea on exertion or Chest Pain. Discussed usual timeline to onset of severe symptomatology\par \par Advised on use of Tylenol for fever, push fluids.\par Contact precautions\par Isolation recommendations\par \par All questions answered.\par Advises of criteria for return to work after COVID 19 infection:\par At least 10 days since onset of symptoms\par At least 3 days since resolution of fever without antipyretics\par Substantial improvement of symptoms and cough mostly resolved\par \par Paxlovid agreed. Pt receive an Rx from urgent care

## 2022-11-28 NOTE — HISTORY OF PRESENT ILLNESS
[Cough] : cough [Moderate] : moderate [Congestion] : congestion [Sore Throat] : sore throat [Chills] : chills [Anorexia] : anorexia [Shortness Of Breath] : no shortness of breath [Earache] : earache [Fatigue] : fatigue [Headache] : headache [Fever] : fever [Tmax= ___] : Tmax = [unfilled] [Stable] : stable [FreeTextEntry8] : Pt with covid 19\par Symptoms started on Saturday 2  days ago\par Tested positive on same day with rapid test. Then did PCR and also positive.

## 2022-11-28 NOTE — PHYSICAL EXAM
[Normal] : no acute distress, well nourished, well developed and well-appearing [Normal Sclera/Conjunctiva] : normal sclera/conjunctiva [Normal Outer Ear/Nose] : the outer ears and nose were normal in appearance [No JVD] : no jugular venous distention [No Respiratory Distress] : no respiratory distress  [Soft] : abdomen soft [de-identified] : Unable to examine due to telehealth visit

## 2022-12-07 ENCOUNTER — APPOINTMENT (OUTPATIENT)
Dept: FAMILY MEDICINE | Facility: CLINIC | Age: 52
End: 2022-12-07

## 2023-01-07 ENCOUNTER — TRANSCRIPTION ENCOUNTER (OUTPATIENT)
Age: 53
End: 2023-01-07

## 2023-01-13 ENCOUNTER — NON-APPOINTMENT (OUTPATIENT)
Age: 53
End: 2023-01-13

## 2023-01-20 ENCOUNTER — APPOINTMENT (OUTPATIENT)
Dept: FAMILY MEDICINE | Facility: CLINIC | Age: 53
End: 2023-01-20

## 2023-01-27 ENCOUNTER — RX RENEWAL (OUTPATIENT)
Age: 53
End: 2023-01-27

## 2023-01-27 ENCOUNTER — APPOINTMENT (OUTPATIENT)
Dept: FAMILY MEDICINE | Facility: CLINIC | Age: 53
End: 2023-01-27
Payer: MEDICAID

## 2023-01-27 VITALS
SYSTOLIC BLOOD PRESSURE: 126 MMHG | OXYGEN SATURATION: 97 % | DIASTOLIC BLOOD PRESSURE: 84 MMHG | TEMPERATURE: 98.4 F | HEART RATE: 71 BPM | HEIGHT: 67 IN | BODY MASS INDEX: 35.31 KG/M2 | WEIGHT: 225 LBS

## 2023-01-27 PROCEDURE — 99214 OFFICE O/P EST MOD 30 MIN: CPT | Mod: 25

## 2023-01-27 NOTE — HISTORY OF PRESENT ILLNESS
[FreeTextEntry6] : Following up on medication and recent hospital stay. \par Had cardiac cath. 20-30% stenosis to LAD\par Increased Crestor to 40 mg

## 2023-02-02 ENCOUNTER — TRANSCRIPTION ENCOUNTER (OUTPATIENT)
Age: 53
End: 2023-02-02

## 2023-02-13 NOTE — PATIENT PROFILE ADULT - NSPROGENOTHERPROVIDER_GEN_A_NUR
Anesthesia Post Evaluation    Patient: Shreya Reyes    Procedure(s) Performed: Procedure(s) (LRB):  INSERTION, INTRAMEDULLARY LEELEE, LEFT FEMUR (Left)    Final Anesthesia Type: general      Patient location during evaluation: PACU  Patient participation: Yes- Able to Participate  Level of consciousness: awake and alert  Post-procedure vital signs: reviewed and stable  Pain management: adequate  Airway patency: patent      Anesthetic complications: no      Cardiovascular status: blood pressure returned to baseline  Respiratory status: unassisted  Hydration status: euvolemic  Follow-up not needed.          Vitals Value Taken Time   /61 02/08/23 1131   Temp ** 02/13/23 1214   Pulse 69 02/08/23 1217   Resp 13 02/08/23 1152   SpO2 99 % 02/08/23 1217   Vitals shown include unvalidated device data.      No case tracking events are documented in the log.      Pain/Singh Score: No data recorded      
none

## 2023-02-15 ENCOUNTER — OUTPATIENT (OUTPATIENT)
Dept: OUTPATIENT SERVICES | Facility: HOSPITAL | Age: 53
LOS: 1 days | End: 2023-02-15
Payer: COMMERCIAL

## 2023-02-15 DIAGNOSIS — G47.33 OBSTRUCTIVE SLEEP APNEA (ADULT) (PEDIATRIC): ICD-10-CM

## 2023-02-15 DIAGNOSIS — Z96.7 PRESENCE OF OTHER BONE AND TENDON IMPLANTS: Chronic | ICD-10-CM

## 2023-02-15 PROCEDURE — 95810 POLYSOM 6/> YRS 4/> PARAM: CPT | Mod: 26

## 2023-02-15 PROCEDURE — 95810 POLYSOM 6/> YRS 4/> PARAM: CPT

## 2023-02-23 ENCOUNTER — NON-APPOINTMENT (OUTPATIENT)
Age: 53
End: 2023-02-23

## 2023-02-24 DIAGNOSIS — G47.33 OBSTRUCTIVE SLEEP APNEA (ADULT) (PEDIATRIC): ICD-10-CM

## 2023-02-25 ENCOUNTER — TRANSCRIPTION ENCOUNTER (OUTPATIENT)
Age: 53
End: 2023-02-25

## 2023-02-27 ENCOUNTER — APPOINTMENT (OUTPATIENT)
Dept: CARDIOLOGY | Facility: CLINIC | Age: 53
End: 2023-02-27

## 2023-03-02 ENCOUNTER — OUTPATIENT (OUTPATIENT)
Dept: OUTPATIENT SERVICES | Facility: HOSPITAL | Age: 53
LOS: 1 days | End: 2023-03-02
Payer: COMMERCIAL

## 2023-03-02 DIAGNOSIS — G47.33 OBSTRUCTIVE SLEEP APNEA (ADULT) (PEDIATRIC): ICD-10-CM

## 2023-03-02 DIAGNOSIS — Z96.7 PRESENCE OF OTHER BONE AND TENDON IMPLANTS: Chronic | ICD-10-CM

## 2023-03-02 PROCEDURE — 95811 POLYSOM 6/>YRS CPAP 4/> PARM: CPT | Mod: 26

## 2023-03-02 PROCEDURE — 95811 POLYSOM 6/>YRS CPAP 4/> PARM: CPT

## 2023-03-08 ENCOUNTER — NON-APPOINTMENT (OUTPATIENT)
Age: 53
End: 2023-03-08

## 2023-03-19 ENCOUNTER — NON-APPOINTMENT (OUTPATIENT)
Age: 53
End: 2023-03-19

## 2023-03-20 NOTE — ED ADULT NURSE NOTE - DRUG PRE-SCREENING (DAST -1)
edema.   Skin:     General: Skin is warm. Coloration: Skin is not jaundiced or pale. Findings: No bruising, erythema, lesion or rash. Neurological:      General: No focal deficit present. Mental Status: She is alert and oriented to person, place, and time. GCS: GCS eye subscore is 4. GCS verbal subscore is 5. GCS motor subscore is 6. Cranial Nerves: Cranial nerves 2-12 are intact. No cranial nerve deficit, dysarthria or facial asymmetry. Sensory: Sensation is intact. No sensory deficit. Motor: Motor function is intact. No weakness, tremor, atrophy, abnormal muscle tone or seizure activity. Coordination: Coordination normal.   Psychiatric:         Mood and Affect: Mood normal.         Behavior: Behavior normal.         Thought Content:  Thought content normal.         Judgment: Judgment normal.         I have reviewed andinterpreted all of the currently available lab results from this visit (if applicable):    Results for orders placed or performed during the hospital encounter of 03/20/23   Rapid Flu Swab    Specimen: Nasopharyngeal   Result Value Ref Range    Rapid Influenza A Ag NEGATIVE NEGATIVE    Rapid Influenza B Ag NEGATIVE NEGATIVE   COVID-19, Rapid    Specimen: Nasopharyngeal   Result Value Ref Range    Source NARES     SARS-CoV-2, NAAT NOT DETECTED NOT DETECTED   CBC with Auto Differential   Result Value Ref Range    WBC 10.2 4.0 - 10.5 K/CU MM    RBC 4.52 4.2 - 5.4 M/CU MM    Hemoglobin 13.7 12.5 - 16.0 GM/DL    Hematocrit 42.0 37 - 47 %    MCV 92.9 78 - 100 FL    MCH 30.3 27 - 31 PG    MCHC 32.6 32.0 - 36.0 %    RDW 15.4 (H) 11.7 - 14.9 %    Platelets 714 476 - 906 K/CU MM    MPV 9.4 7.5 - 11.1 FL    Differential Type AUTOMATED DIFFERENTIAL     Segs Relative 85.6 (H) 36 - 66 %    Lymphocytes % 7.6 (L) 24 - 44 %    Monocytes % 4.7 (H) 0 - 4 %    Eosinophils % 0.6 0 - 3 %    Basophils % 0.7 0 - 1 %    Segs Absolute 8.7 K/CU MM    Lymphocytes Absolute 0.8 K/CU MM
Statement Selected

## 2023-04-06 ENCOUNTER — NON-APPOINTMENT (OUTPATIENT)
Age: 53
End: 2023-04-06

## 2023-04-12 NOTE — REVIEW OF SYSTEMS
[FreeTextEntry2] : Other than as documented here and in the HPI, the thirteen point ROS is negative Hemoglobin 8.3, unknown baseline. Suspect from history of CKD. May be component of dilutional from volume overload.   - iron deficient: start iv iron sucrose   - consented for blood transfusion no

## 2023-04-17 ENCOUNTER — APPOINTMENT (OUTPATIENT)
Dept: FAMILY MEDICINE | Facility: CLINIC | Age: 53
End: 2023-04-17

## 2023-05-08 ENCOUNTER — NON-APPOINTMENT (OUTPATIENT)
Age: 53
End: 2023-05-08

## 2023-05-16 NOTE — ED ADULT NURSE NOTE - NSIMPLEMENTINTERV_GEN_ALL_ED
Ok to increase to wegovy 1mg once weekly after being on 0.5mg x 4 weeks.  Rx sent to [facility] Implemented All Fall Risk Interventions:  [place] to call system. Call bell, personal items and telephone within reach. Instruct patient to call for assistance. Room bathroom lighting operational. Non-slip footwear when patient is off stretcher. Physically safe environment: no spills, clutter or unnecessary equipment. Stretcher in lowest position, wheels locked, appropriate side rails in place. Provide visual cue, wrist band, yellow gown, etc. Monitor gait and stability. Monitor for mental status changes and reorient to person, place, and time. Review medications for side effects contributing to fall risk. Reinforce activity limits and safety measures with patient and family.

## 2023-05-19 ENCOUNTER — APPOINTMENT (OUTPATIENT)
Dept: FAMILY MEDICINE | Facility: CLINIC | Age: 53
End: 2023-05-19
Payer: MEDICAID

## 2023-05-19 VITALS
HEART RATE: 76 BPM | DIASTOLIC BLOOD PRESSURE: 70 MMHG | BODY MASS INDEX: 35.31 KG/M2 | TEMPERATURE: 98.7 F | OXYGEN SATURATION: 98 % | HEIGHT: 67 IN | SYSTOLIC BLOOD PRESSURE: 110 MMHG | WEIGHT: 225 LBS

## 2023-05-19 DIAGNOSIS — N20.0 CALCULUS OF KIDNEY: ICD-10-CM

## 2023-05-19 DIAGNOSIS — Z86.79 PERSONAL HISTORY OF OTHER DISEASES OF THE CIRCULATORY SYSTEM: ICD-10-CM

## 2023-05-19 DIAGNOSIS — S93.432A SPRAIN OF TIBIOFIBULAR LIGAMENT OF LEFT ANKLE, INITIAL ENCOUNTER: ICD-10-CM

## 2023-05-19 DIAGNOSIS — Z11.59 ENCOUNTER FOR SCREENING FOR OTHER VIRAL DISEASES: ICD-10-CM

## 2023-05-19 DIAGNOSIS — M25.512 PAIN IN LEFT SHOULDER: ICD-10-CM

## 2023-05-19 DIAGNOSIS — N52.9 MALE ERECTILE DYSFUNCTION, UNSPECIFIED: ICD-10-CM

## 2023-05-19 DIAGNOSIS — Z01.818 ENCOUNTER FOR OTHER PREPROCEDURAL EXAMINATION: ICD-10-CM

## 2023-05-19 DIAGNOSIS — R07.89 OTHER CHEST PAIN: ICD-10-CM

## 2023-05-19 DIAGNOSIS — G89.29 PAIN IN LEFT SHOULDER: ICD-10-CM

## 2023-05-19 DIAGNOSIS — M54.12 RADICULOPATHY, CERVICAL REGION: ICD-10-CM

## 2023-05-19 DIAGNOSIS — Z98.890 OTHER SPECIFIED POSTPROCEDURAL STATES: ICD-10-CM

## 2023-05-19 DIAGNOSIS — Z23 ENCOUNTER FOR IMMUNIZATION: ICD-10-CM

## 2023-05-19 DIAGNOSIS — S46.001A UNSPECIFIED INJURY OF MUSCLE(S) AND TENDON(S) OF THE ROTATOR CUFF OF RIGHT SHOULDER, INITIAL ENCOUNTER: ICD-10-CM

## 2023-05-19 DIAGNOSIS — U07.1 COVID-19: ICD-10-CM

## 2023-05-19 DIAGNOSIS — S93.432D SPRAIN OF TIBIOFIBULAR LIGAMENT OF LEFT ANKLE, SUBSEQUENT ENCOUNTER: ICD-10-CM

## 2023-05-19 DIAGNOSIS — Z22.7 LATENT TUBERCULOSIS: ICD-10-CM

## 2023-05-19 DIAGNOSIS — F43.23 ADJUSTMENT DISORDER WITH MIXED ANXIETY AND DEPRESSED MOOD: ICD-10-CM

## 2023-05-19 PROCEDURE — 99214 OFFICE O/P EST MOD 30 MIN: CPT | Mod: 25

## 2023-05-19 RX ORDER — VALACYCLOVIR 1 G/1
1 TABLET, FILM COATED ORAL DAILY
Qty: 90 | Refills: 1 | Status: ACTIVE | COMMUNITY
Start: 2018-02-28 | End: 1900-01-01

## 2023-05-20 LAB
ALBUMIN SERPL ELPH-MCNC: 4.6 G/DL
ALP BLD-CCNC: 84 U/L
ALT SERPL-CCNC: 40 U/L
ANION GAP SERPL CALC-SCNC: 13 MMOL/L
AST SERPL-CCNC: 23 U/L
BILIRUB SERPL-MCNC: 0.2 MG/DL
BUN SERPL-MCNC: 15 MG/DL
CALCIUM SERPL-MCNC: 9.3 MG/DL
CHLORIDE SERPL-SCNC: 100 MMOL/L
CHOLEST SERPL-MCNC: 107 MG/DL
CO2 SERPL-SCNC: 26 MMOL/L
CREAT SERPL-MCNC: 1.28 MG/DL
EGFR: 67 ML/MIN/1.73M2
GLUCOSE SERPL-MCNC: 111 MG/DL
HDLC SERPL-MCNC: 43 MG/DL
LDLC SERPL CALC-MCNC: 49 MG/DL
NONHDLC SERPL-MCNC: 64 MG/DL
POTASSIUM SERPL-SCNC: 4.6 MMOL/L
PROT SERPL-MCNC: 6.6 G/DL
SODIUM SERPL-SCNC: 139 MMOL/L
T3FREE SERPL-MCNC: 3.31 PG/ML
T4 FREE SERPL-MCNC: 1.6 NG/DL
TESTOST SERPL-MCNC: 228 NG/DL
TRIGL SERPL-MCNC: 75 MG/DL

## 2023-05-26 ENCOUNTER — TRANSCRIPTION ENCOUNTER (OUTPATIENT)
Age: 53
End: 2023-05-26

## 2023-05-26 RX ORDER — FEXOFENADINE HYDROCHLORIDE 180 MG/1
180 TABLET ORAL DAILY
Qty: 30 | Refills: 5 | Status: ACTIVE | COMMUNITY
Start: 2023-05-26 | End: 1900-01-01

## 2023-05-28 ENCOUNTER — NON-APPOINTMENT (OUTPATIENT)
Age: 53
End: 2023-05-28

## 2023-05-30 ENCOUNTER — NON-APPOINTMENT (OUTPATIENT)
Age: 53
End: 2023-05-30

## 2023-06-05 ENCOUNTER — TRANSCRIPTION ENCOUNTER (OUTPATIENT)
Age: 53
End: 2023-06-05

## 2023-06-14 ENCOUNTER — NON-APPOINTMENT (OUTPATIENT)
Age: 53
End: 2023-06-14

## 2023-06-23 NOTE — ED ADULT NURSE NOTE - CAS EDN DISCHARGE ASSESSMENT
verbal cues/nonverbal cues (demo/gestures)/2 person assist Alert and oriented to person, place and time

## 2023-07-15 ENCOUNTER — APPOINTMENT (OUTPATIENT)
Dept: FAMILY MEDICINE | Facility: CLINIC | Age: 53
End: 2023-07-15
Payer: COMMERCIAL

## 2023-07-15 VITALS
BODY MASS INDEX: 35.24 KG/M2 | TEMPERATURE: 98.1 F | WEIGHT: 225 LBS | SYSTOLIC BLOOD PRESSURE: 122 MMHG | HEART RATE: 76 BPM | DIASTOLIC BLOOD PRESSURE: 74 MMHG | OXYGEN SATURATION: 98 %

## 2023-07-15 PROCEDURE — 99214 OFFICE O/P EST MOD 30 MIN: CPT

## 2023-07-15 NOTE — RESULTS/DATA
[] : results reviewed [de-identified] : WNL [de-identified] : WNL [de-identified] : WNL [de-identified] : NSR, with [facility]

## 2023-07-15 NOTE — CONSULT LETTER
[Dear  ___] : Dear  [unfilled], [( Thank you for referring [unfilled] for consultation for _____ )] : Thank you for referring [unfilled] for consultation for [unfilled] [Please see my note below.] : Please see my note below. [Consult Closing:] : Thank you very much for allowing me to participate in the care of this patient.  If you have any questions, please do not hesitate to contact me. [Sincerely,] : Sincerely, [FreeTextEntry3] : Vic Freeman MD, FAAFP\par Chair, Family Medicine, [facility]\par , Family Medicine, [facility], [facility]\par , Family Medicine, [employer]\par , Family and Community Medicine, [facility]\par \par

## 2023-07-15 NOTE — PLAN
[FreeTextEntry1] : The patient has been advised to remain NPO after the midright prior to surgery.\par Risks and benefits of the procedure have been discussed by the operative physician.\par The patient has been advised to avoid taking aspirin and aspirin containing products from now until surgery.\par \par Thank you for including me in the care of your patient.\par

## 2023-07-15 NOTE — HISTORY OF PRESENT ILLNESS
[No Pertinent Cardiac History] : no history of aortic stenosis, atrial fibrillation, coronary artery disease, recent myocardial infarction, or implantable device/pacemaker [Sleep Apnea] : sleep apnea [No Adverse Anesthesia Reaction] : no adverse anesthesia reaction in self or family member [(Patient denies any chest pain, claudication, dyspnea on exertion, orthopnea, palpitations or syncope)] : Patient denies any chest pain, claudication, dyspnea on exertion, orthopnea, palpitations or syncope [Asthma] : no asthma [COPD] : no COPD [Smoker] : not a smoker [Chronic Anticoagulation] : no chronic anticoagulation [Chronic Kidney Disease] : no chronic kidney disease [FreeTextEntry1] : Upper Endoscopy at [facility] in [place] [FreeTextEntry2] : 07/18/2023 [FreeTextEntry3] : Juan David Mitchell MD [FreeTextEntry4] : pt is here for medical clearance.for upper endoscopy

## 2023-07-24 ENCOUNTER — TRANSCRIPTION ENCOUNTER (OUTPATIENT)
Age: 53
End: 2023-07-24

## 2023-07-25 NOTE — COUNSELING
[Fall prevention counseling provided] : Fall prevention counseling provided [Behavioral health counseling provided] : Behavioral health counseling provided [Good understanding] : Patient has a good understanding of disease, goals and obesity follow-up plan within functional limits

## 2023-08-23 ENCOUNTER — APPOINTMENT (OUTPATIENT)
Dept: FAMILY MEDICINE | Facility: CLINIC | Age: 53
End: 2023-08-23

## 2023-08-29 ENCOUNTER — APPOINTMENT (OUTPATIENT)
Dept: FAMILY MEDICINE | Facility: CLINIC | Age: 53
End: 2023-08-29

## 2023-09-03 ENCOUNTER — RX RENEWAL (OUTPATIENT)
Age: 53
End: 2023-09-03

## 2023-09-08 ENCOUNTER — APPOINTMENT (OUTPATIENT)
Dept: FAMILY MEDICINE | Facility: CLINIC | Age: 53
End: 2023-09-08
Payer: OTHER MISCELLANEOUS

## 2023-09-08 VITALS
TEMPERATURE: 98 F | DIASTOLIC BLOOD PRESSURE: 80 MMHG | WEIGHT: 225 LBS | SYSTOLIC BLOOD PRESSURE: 100 MMHG | BODY MASS INDEX: 35.31 KG/M2 | HEART RATE: 86 BPM | HEIGHT: 67 IN | OXYGEN SATURATION: 98 %

## 2023-09-08 PROCEDURE — 99213 OFFICE O/P EST LOW 20 MIN: CPT
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
## 2023-09-08 NOTE — REVIEW OF SYSTEMS
[Joint Pain] : joint pain [Joint Stiffness] : no joint stiffness [Joint Swelling] : no joint swelling [Muscle Pain] : no muscle pain [Negative] : Heme/Lymph

## 2023-09-08 NOTE — PHYSICAL EXAM
[Normal] : normal rate, regular rhythm, normal S1 and S2 and no murmur heard [de-identified] : l shoulder weakness of  left rotor cuff

## 2023-09-12 ENCOUNTER — RX RENEWAL (OUTPATIENT)
Age: 53
End: 2023-09-12

## 2023-09-13 ENCOUNTER — APPOINTMENT (OUTPATIENT)
Dept: FAMILY MEDICINE | Facility: CLINIC | Age: 53
End: 2023-09-13
Payer: COMMERCIAL

## 2023-09-13 ENCOUNTER — APPOINTMENT (OUTPATIENT)
Dept: ORTHOPEDIC SURGERY | Facility: CLINIC | Age: 53
End: 2023-09-13
Payer: OTHER MISCELLANEOUS

## 2023-09-13 VITALS
TEMPERATURE: 97.9 F | OXYGEN SATURATION: 96 % | DIASTOLIC BLOOD PRESSURE: 80 MMHG | HEIGHT: 67 IN | SYSTOLIC BLOOD PRESSURE: 112 MMHG | WEIGHT: 219 LBS | BODY MASS INDEX: 34.37 KG/M2 | HEART RATE: 80 BPM

## 2023-09-13 VITALS — WEIGHT: 219 LBS | HEIGHT: 67 IN | BODY MASS INDEX: 34.37 KG/M2

## 2023-09-13 DIAGNOSIS — Z23 ENCOUNTER FOR IMMUNIZATION: ICD-10-CM

## 2023-09-13 DIAGNOSIS — Z01.818 ENCOUNTER FOR OTHER PREPROCEDURAL EXAMINATION: ICD-10-CM

## 2023-09-13 PROCEDURE — G0008: CPT

## 2023-09-13 PROCEDURE — 90686 IIV4 VACC NO PRSV 0.5 ML IM: CPT

## 2023-09-13 PROCEDURE — 99205 OFFICE O/P NEW HI 60 MIN: CPT

## 2023-09-13 PROCEDURE — 99214 OFFICE O/P EST MOD 30 MIN: CPT | Mod: 25

## 2023-09-13 PROCEDURE — 73010 X-RAY EXAM OF SHOULDER BLADE: CPT | Mod: RT

## 2023-09-13 NOTE — ED PROVIDER NOTE - CHILD ABUSE FACILITY
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    Socially the family is in [facility]    Laureen syndrome is a genetic disorder which is atypical facial features short height congenital heart disease bleeding problems and skeletal malformations spinal anomaly occur in approximately 30% of the patients.  There is also an association for developing scoliosis as well as developing increased lordosis    Review of Systems   Constitutional: Negative for diaphoresis, fever, malaise/fatigue and weight loss.   HENT: Negative for congestion.    Eyes: Negative for photophobia, discharge and redness.   Respiratory: Negative for cough, wheezing and stridor.    Cardiovascular: Negative for leg swelling.   Gastrointestinal: Negative for constipation, diarrhea, nausea and vomiting.   Genitourinary:        No renal disease or abnormalities   Musculoskeletal: Negative for back pain, joint pain and neck pain.   Skin: Negative for rash.   Neurological: Negative for tremors, sensory change, speech change, focal weakness, seizures, loss of consciousness and weakness.   Endo/Heme/Allergies: Does not bruise/bleed easily.      has a past medical history of Anterior knee pain, left (9/16/2020), ASTHMA, Delayed bone age, Heart murmur, Laureen syndrome, Pulmonary valve stenosis, and Underweight (7/19/2017).    She has no past medical history of CAD (coronary artery disease), COPD, or Liver disease.    Past Surgical History:   Procedure Laterality Date    LAPAROSCOPY CHILD  12/31/2015    Procedure: LAPAROSCOPY CHILD OF ABDOMEN, PERITONEUM, AND OMENTUM;  Surgeon: Lavern Franklin M.D.;  Location: SURGERY [facility];  Service:     LAPAROSCOPIC LYSIS OF ADHESIONS  12/31/2015     "Procedure: LAPAROSCOPIC LYSIS OF ADHESIONS;  Surgeon: Lavern Franklin M.D.;  Location: SURGERY [facility];  Service:     DENTAL RESTORATION  11/24/2009    Performed by ARJUN CHRISTIE at SURGERY SAME DAY [facility]    OTHER  1/10/09    dental surgery    OTHER      tubes in ears     family history includes No Known Problems in her father and mother; Other in some other family members.    Nkda [no known drug allergy]    has a current medication list which includes the following prescription(s): vitamin d (ergocalciferol).    Temp 36.3 °C (97.4 °F) (Temporal)   Ht 1.461 m (4' 9.5\")   Wt 47.7 kg (105 lb 3 oz)     Physical Exam:     Patient has a normal gait and appropriate for their age.  Healthy-appearing in no acute distress  Weight appropriate for age and size  Affect is appropriate for situation   Head: asymmetry of the jaw.    Eyes: extra-ocular movements intact   Nose: No discharge is noted no other abnormalities   Throat: No difficulty swallowing no erythema otherwise normal line   Neck: Supple and non-tender   Lungs: non-labored breathing, no retractions   Cardio: cap refill <2sec, equal pulses bilaterally  Skin: Intact, no rashes, no breakdown     They have good toe walking and heel walking and a good normal tandem gait.  Their motor strength is 5 over 5 throughout in all motor groups.  Their sensation is intact to light touch and they have no spasticity or clonus noted.  They have a negative straight leg raise on the right and on the left.  Reflexes are 2 and symmetric bilateral in patella and achilles    On standing their pelvis is level, their leg lengths are equal, and the spine is balanced.  The waist is symmetric.  The shoulders are level. They have no skin lesions.  On forward bend: They have no prominence but increased lordosis      X-rays on my review 5 degrees of spinal asymmetry bone age shows her to be approximately 16 she is a Lutz 7/8    Assessment: Spinal asymmetry with Cando " syndrome      Plan: A  present on the video system I have gone over the results with the mother at this point there is been no change she has no progression of her development of scoliosis she is now skeletally mature so I think will no longer be a worry for therefore she will need no further scheduled follow-up should she have any problems I would be happy to see her again in the future her mom is in agreement and all questions were answered      Chuckie Thao MD  Director Pediatric Orthopedics and Scoliosis           [facility]

## 2023-09-14 ENCOUNTER — TRANSCRIPTION ENCOUNTER (OUTPATIENT)
Age: 53
End: 2023-09-14

## 2023-09-15 ENCOUNTER — NON-APPOINTMENT (OUTPATIENT)
Age: 53
End: 2023-09-15

## 2023-09-15 ENCOUNTER — TRANSCRIPTION ENCOUNTER (OUTPATIENT)
Age: 53
End: 2023-09-15

## 2023-09-15 LAB
ALBUMIN SERPL ELPH-MCNC: 5 G/DL
ALP BLD-CCNC: 84 U/L
ALT SERPL-CCNC: 38 U/L
ANION GAP SERPL CALC-SCNC: 13 MMOL/L
AST SERPL-CCNC: 24 U/L
BASOPHILS # BLD AUTO: 0.07 K/UL
BASOPHILS NFR BLD AUTO: 1.1 %
BILIRUB SERPL-MCNC: 0.3 MG/DL
BUN SERPL-MCNC: 13 MG/DL
CALCIUM SERPL-MCNC: 9.6 MG/DL
CHLORIDE SERPL-SCNC: 101 MMOL/L
CHOLEST SERPL-MCNC: 99 MG/DL
CO2 SERPL-SCNC: 26 MMOL/L
CREAT SERPL-MCNC: 1.33 MG/DL
EGFR: 64 ML/MIN/1.73M2
EOSINOPHIL # BLD AUTO: 0.1 K/UL
EOSINOPHIL NFR BLD AUTO: 1.5 %
ESTIMATED AVERAGE GLUCOSE: 114 MG/DL
GLUCOSE SERPL-MCNC: 94 MG/DL
HBA1C MFR BLD HPLC: 5.6 %
HCT VFR BLD CALC: 55.6 %
HDLC SERPL-MCNC: 33 MG/DL
HGB BLD-MCNC: 17.5 G/DL
IMM GRANULOCYTES NFR BLD AUTO: 0.2 %
LDLC SERPL CALC-MCNC: 41 MG/DL
LYMPHOCYTES # BLD AUTO: 2.28 K/UL
LYMPHOCYTES NFR BLD AUTO: 34.7 %
MAN DIFF?: NORMAL
MCHC RBC-ENTMCNC: 27.7 PG
MCHC RBC-ENTMCNC: 31.5 GM/DL
MCV RBC AUTO: 88.1 FL
MONOCYTES # BLD AUTO: 0.59 K/UL
MONOCYTES NFR BLD AUTO: 9 %
NEUTROPHILS # BLD AUTO: 3.53 K/UL
NEUTROPHILS NFR BLD AUTO: 53.5 %
NONHDLC SERPL-MCNC: 66 MG/DL
PLATELET # BLD AUTO: 280 K/UL
POTASSIUM SERPL-SCNC: 4.4 MMOL/L
PROT SERPL-MCNC: 7.3 G/DL
PSA FREE FLD-MCNC: 20 %
PSA FREE SERPL-MCNC: 0.29 NG/ML
PSA SERPL-MCNC: 1.46 NG/ML
RBC # BLD: 6.31 M/UL
RBC # FLD: 14.6 %
SODIUM SERPL-SCNC: 140 MMOL/L
TESTOST SERPL-MCNC: 85.6 NG/DL
TRIGL SERPL-MCNC: 142 MG/DL
WBC # FLD AUTO: 6.58 K/UL

## 2023-09-28 ENCOUNTER — APPOINTMENT (OUTPATIENT)
Dept: ORTHOPEDIC SURGERY | Facility: CLINIC | Age: 53
End: 2023-09-28
Payer: OTHER MISCELLANEOUS

## 2023-09-28 VITALS — HEIGHT: 67 IN | BODY MASS INDEX: 34.37 KG/M2 | WEIGHT: 219 LBS

## 2023-09-28 PROCEDURE — 99213 OFFICE O/P EST LOW 20 MIN: CPT | Mod: ACP,25

## 2023-09-28 PROCEDURE — 20610 DRAIN/INJ JOINT/BURSA W/O US: CPT | Mod: LT

## 2023-09-28 PROCEDURE — J3490M: CUSTOM

## 2023-10-03 ENCOUNTER — NON-APPOINTMENT (OUTPATIENT)
Age: 53
End: 2023-10-03

## 2023-11-01 ENCOUNTER — APPOINTMENT (OUTPATIENT)
Dept: ORTHOPEDIC SURGERY | Facility: CLINIC | Age: 53
End: 2023-11-01
Payer: OTHER MISCELLANEOUS

## 2023-11-01 VITALS — HEIGHT: 67 IN | WEIGHT: 219 LBS | BODY MASS INDEX: 34.37 KG/M2

## 2023-11-01 DIAGNOSIS — S43.003A UNSPECIFIED SUBLUXATION OF UNSPECIFIED SHOULDER JOINT, INITIAL ENCOUNTER: ICD-10-CM

## 2023-11-01 PROCEDURE — J3490M: CUSTOM

## 2023-11-01 PROCEDURE — 20610 DRAIN/INJ JOINT/BURSA W/O US: CPT | Mod: LT

## 2023-11-01 PROCEDURE — 99213 OFFICE O/P EST LOW 20 MIN: CPT | Mod: 25

## 2023-11-06 ENCOUNTER — NON-APPOINTMENT (OUTPATIENT)
Age: 53
End: 2023-11-06

## 2023-11-08 ENCOUNTER — TRANSCRIPTION ENCOUNTER (OUTPATIENT)
Age: 53
End: 2023-11-08

## 2023-11-10 ENCOUNTER — TRANSCRIPTION ENCOUNTER (OUTPATIENT)
Age: 53
End: 2023-11-10

## 2023-11-16 RX ORDER — FLUTICASONE PROPIONATE 50 UG/1
50 SPRAY, METERED NASAL DAILY
Qty: 1 | Refills: 2 | Status: ACTIVE | COMMUNITY
Start: 2022-07-12 | End: 1900-01-01

## 2023-12-02 ENCOUNTER — APPOINTMENT (OUTPATIENT)
Dept: FAMILY MEDICINE | Facility: CLINIC | Age: 53
End: 2023-12-02

## 2023-12-04 ENCOUNTER — NON-APPOINTMENT (OUTPATIENT)
Age: 53
End: 2023-12-04

## 2023-12-07 ENCOUNTER — TRANSCRIPTION ENCOUNTER (OUTPATIENT)
Age: 53
End: 2023-12-07

## 2023-12-08 ENCOUNTER — APPOINTMENT (OUTPATIENT)
Dept: FAMILY MEDICINE | Facility: CLINIC | Age: 53
End: 2023-12-08
Payer: MEDICAID

## 2023-12-08 DIAGNOSIS — F43.10 POST-TRAUMATIC STRESS DISORDER, UNSPECIFIED: ICD-10-CM

## 2023-12-08 PROCEDURE — 99214 OFFICE O/P EST MOD 30 MIN: CPT | Mod: 25

## 2023-12-12 ENCOUNTER — NON-APPOINTMENT (OUTPATIENT)
Age: 53
End: 2023-12-12

## 2023-12-12 LAB
ALBUMIN SERPL ELPH-MCNC: 4.7 G/DL
ALP BLD-CCNC: 75 U/L
ALT SERPL-CCNC: 81 U/L
ANION GAP SERPL CALC-SCNC: 11 MMOL/L
AST SERPL-CCNC: 34 U/L
BASOPHILS # BLD AUTO: 0.04 K/UL
BASOPHILS NFR BLD AUTO: 0.5 %
BILIRUB SERPL-MCNC: 0.3 MG/DL
BUN SERPL-MCNC: 20 MG/DL
CALCIUM SERPL-MCNC: 9.8 MG/DL
CHLORIDE SERPL-SCNC: 101 MMOL/L
CHOLEST SERPL-MCNC: 108 MG/DL
CO2 SERPL-SCNC: 27 MMOL/L
CREAT SERPL-MCNC: 1.18 MG/DL
EGFR: 74 ML/MIN/1.73M2
EOSINOPHIL # BLD AUTO: 0.03 K/UL
EOSINOPHIL NFR BLD AUTO: 0.3 %
ESTIMATED AVERAGE GLUCOSE: 120 MG/DL
GLUCOSE SERPL-MCNC: 115 MG/DL
HBA1C MFR BLD HPLC: 5.8 %
HCT VFR BLD CALC: 46.2 %
HDLC SERPL-MCNC: 47 MG/DL
HGB BLD-MCNC: 14.7 G/DL
IMM GRANULOCYTES NFR BLD AUTO: 0.3 %
LDLC SERPL CALC-MCNC: 46 MG/DL
LYMPHOCYTES # BLD AUTO: 1.65 K/UL
LYMPHOCYTES NFR BLD AUTO: 19 %
MAN DIFF?: NORMAL
MCHC RBC-ENTMCNC: 27.7 PG
MCHC RBC-ENTMCNC: 31.8 GM/DL
MCV RBC AUTO: 87 FL
MONOCYTES # BLD AUTO: 0.87 K/UL
MONOCYTES NFR BLD AUTO: 10 %
NEUTROPHILS # BLD AUTO: 6.05 K/UL
NEUTROPHILS NFR BLD AUTO: 69.9 %
NONHDLC SERPL-MCNC: 61 MG/DL
PLATELET # BLD AUTO: 289 K/UL
POTASSIUM SERPL-SCNC: 4.3 MMOL/L
PROT SERPL-MCNC: 6.2 G/DL
RBC # BLD: 5.31 M/UL
RBC # FLD: 14.5 %
SODIUM SERPL-SCNC: 139 MMOL/L
T3FREE SERPL-MCNC: 2.83 PG/ML
TESTOST SERPL-MCNC: 102 NG/DL
TRIGL SERPL-MCNC: 73 MG/DL
TSH SERPL-ACNC: 0.8 UIU/ML
WBC # FLD AUTO: 8.67 K/UL

## 2023-12-13 ENCOUNTER — APPOINTMENT (OUTPATIENT)
Dept: ORTHOPEDIC SURGERY | Facility: CLINIC | Age: 53
End: 2023-12-13

## 2024-01-03 ENCOUNTER — RX CHANGE (OUTPATIENT)
Age: 54
End: 2024-01-03

## 2024-01-03 RX ORDER — TRAZODONE HYDROCHLORIDE 50 MG/1
50 TABLET ORAL
Qty: 90 | Refills: 0 | Status: DISCONTINUED | COMMUNITY
Start: 2022-10-28 | End: 2024-01-03

## 2024-01-03 RX ORDER — TRAZODONE HYDROCHLORIDE 50 MG/1
50 TABLET ORAL
Qty: 90 | Refills: 1 | Status: ACTIVE | COMMUNITY
Start: 1900-01-01 | End: 1900-01-01

## 2024-01-17 ENCOUNTER — RX RENEWAL (OUTPATIENT)
Age: 54
End: 2024-01-17

## 2024-01-17 RX ORDER — IPRATROPIUM BROMIDE 21 UG/1
0.03 SPRAY NASAL
Qty: 90 | Refills: 3 | Status: ACTIVE | COMMUNITY
Start: 2023-05-26 | End: 1900-01-01

## 2024-02-12 ENCOUNTER — APPOINTMENT (OUTPATIENT)
Dept: ORTHOPEDIC SURGERY | Facility: CLINIC | Age: 54
End: 2024-02-12

## 2024-02-12 ENCOUNTER — APPOINTMENT (OUTPATIENT)
Dept: FAMILY MEDICINE | Facility: CLINIC | Age: 54
End: 2024-02-12

## 2024-02-14 ENCOUNTER — TRANSCRIPTION ENCOUNTER (OUTPATIENT)
Age: 54
End: 2024-02-14

## 2024-02-14 RX ORDER — SEMAGLUTIDE 2.4 MG/.75ML
2.4 INJECTION, SOLUTION SUBCUTANEOUS
Qty: 1 | Refills: 1 | Status: ACTIVE | COMMUNITY
Start: 2023-09-13 | End: 1900-01-01

## 2024-02-15 ENCOUNTER — APPOINTMENT (OUTPATIENT)
Dept: FAMILY MEDICINE | Facility: CLINIC | Age: 54
End: 2024-02-15
Payer: MEDICAID

## 2024-02-15 ENCOUNTER — TRANSCRIPTION ENCOUNTER (OUTPATIENT)
Age: 54
End: 2024-02-15

## 2024-02-15 VITALS
BODY MASS INDEX: 32.96 KG/M2 | HEIGHT: 67 IN | DIASTOLIC BLOOD PRESSURE: 82 MMHG | SYSTOLIC BLOOD PRESSURE: 126 MMHG | WEIGHT: 210 LBS

## 2024-02-15 DIAGNOSIS — K21.9 GASTRO-ESOPHAGEAL REFLUX DISEASE W/OUT ESOPHAGITIS: ICD-10-CM

## 2024-02-15 PROCEDURE — 99214 OFFICE O/P EST MOD 30 MIN: CPT

## 2024-02-15 RX ORDER — ZOLPIDEM TARTRATE 10 MG/1
10 TABLET ORAL
Qty: 30 | Refills: 1 | Status: DISCONTINUED | COMMUNITY
Start: 2018-04-13 | End: 2024-02-15

## 2024-02-15 NOTE — HISTORY OF PRESENT ILLNESS
[Home] : at home, [unfilled] , at the time of the visit. [Medical Office: ([facility])___] : at the medical office located in  [Verbal consent obtained from patient] : the patient, [unfilled] [FreeTextEntry4] : Carlita Shelton

## 2024-02-15 NOTE — PHYSICAL EXAM
[Normal Sclera/Conjunctiva] : normal sclera/conjunctiva [Normal] : no acute distress, well nourished, well developed and well-appearing [No JVD] : no jugular venous distention [No Respiratory Distress] : no respiratory distress  [de-identified] : Unable to examine due to telehealth visit

## 2024-02-27 ENCOUNTER — TRANSCRIPTION ENCOUNTER (OUTPATIENT)
Age: 54
End: 2024-02-27

## 2024-03-01 ENCOUNTER — APPOINTMENT (OUTPATIENT)
Dept: ORTHOPEDIC SURGERY | Facility: CLINIC | Age: 54
End: 2024-03-01
Payer: OTHER MISCELLANEOUS

## 2024-03-01 VITALS — WEIGHT: 210 LBS | BODY MASS INDEX: 32.96 KG/M2 | HEIGHT: 67 IN

## 2024-03-01 DIAGNOSIS — S46.002A UNSPECIFIED INJURY OF MUSCLE(S) AND TENDON(S) OF THE ROTATOR CUFF OF LEFT SHOULDER, INITIAL ENCOUNTER: ICD-10-CM

## 2024-03-01 PROCEDURE — 20610 DRAIN/INJ JOINT/BURSA W/O US: CPT | Mod: LT

## 2024-03-01 PROCEDURE — 99213 OFFICE O/P EST LOW 20 MIN: CPT | Mod: ACP,25

## 2024-03-03 PROBLEM — S46.002A INJURY OF LEFT ROTATOR CUFF, INITIAL ENCOUNTER: Status: ACTIVE | Noted: 2023-09-08

## 2024-03-03 NOTE — HISTORY OF PRESENT ILLNESS
[Work related] : work related [8] : 8 [Radiating] : radiating [Injection therapy] : injection therapy [Sharp] : sharp [Not working due to injury] : Work status: not working due to injury [FreeTextEntry3] : 8/23/23 [] : Post Surgical Visit: no [FreeTextEntry5] : 54 y/o M presents for WCFU eval of the Lt. shldr today. Previous tx CSI with good relief.  [FreeTextEntry7] : down Lt. arm [de-identified] : [employer]  [FreeTextEntry6] : Numbness

## 2024-03-03 NOTE — ASSESSMENT
[FreeTextEntry1] : The patient comes in today for follow-up on his left shoulder.  The patient had excellent relief from his previous two injections and wishes to try these again. He continues to have pain in the left shoulder consistent with his torn rotator cuff and subluxed biceps.  He has been out of work since October 22. So far the rest is definitely helped him and he feels a little bit better although he continues to have pain and weakness doing overhead activities and has pain at rest and at night.  Examination of the left upper extremity reveals a normal neurovascular exam.  Examination of the left shoulder reveals forward elevation 150, external rotation is 60, and internal rotation to T10.  There is 5 out of 5 strength of deltoid and rotator cuff.  There is a positive cross chest sign.  Positive speeds test.  There is 5-/5 strength of supraspinatus and 5-/5 strength of subscapularis.  There is no instability or apprehension.  There is normal deltoid strength.  He has markedly positive Neer and Shah impingement signs.  There is no instability or apprehension.  Under sterile conditions the left shoulder was injected in the subacromial space with 5 cc quarter percent plain Marcaine 5 cc of 2% plain lidocaine and 40 mg of Kenalog.  Patient tolerated the procedure well.  Under sterile conditions the left shoulder was injected intra-articularly with 5 cc of quarter percent plain Marcaine 5 cc of 2% plain lidocaine and 40 mg of Kenalog.  The patient tolerated the procedure well.  The intra-articular injection worked better than the subacromial injection at relieving his pain. The patient has returned to work full duty. His left shoulder pain is causally related to his work accident.  We will see him back in the office in 6 weeks if he has not already had the surgery.

## 2024-03-05 ENCOUNTER — TRANSCRIPTION ENCOUNTER (OUTPATIENT)
Age: 54
End: 2024-03-05

## 2024-03-05 RX ORDER — ZOLPIDEM TARTRATE 12.5 MG/1
12.5 TABLET, EXTENDED RELEASE ORAL
Qty: 90 | Refills: 0 | Status: DISCONTINUED | COMMUNITY
Start: 2018-06-25 | End: 2024-03-05

## 2024-03-05 RX ORDER — SILDENAFIL 100 MG/1
100 TABLET, FILM COATED ORAL
Qty: 60 | Refills: 1 | Status: ACTIVE | COMMUNITY
Start: 2020-06-10 | End: 1900-01-01

## 2024-03-13 ENCOUNTER — NON-APPOINTMENT (OUTPATIENT)
Age: 54
End: 2024-03-13

## 2024-03-18 ENCOUNTER — APPOINTMENT (OUTPATIENT)
Dept: FAMILY MEDICINE | Facility: CLINIC | Age: 54
End: 2024-03-18

## 2024-03-29 ENCOUNTER — APPOINTMENT (OUTPATIENT)
Dept: FAMILY MEDICINE | Facility: CLINIC | Age: 54
End: 2024-03-29
Payer: MEDICAID

## 2024-03-29 PROCEDURE — 99213 OFFICE O/P EST LOW 20 MIN: CPT

## 2024-03-29 RX ORDER — NEEDLES, FILTER 19GX1 1/2"
22G X 1-1/2" NEEDLE, DISPOSABLE MISCELLANEOUS
Qty: 24 | Refills: 0 | Status: ACTIVE | COMMUNITY
Start: 2020-10-30 | End: 1900-01-01

## 2024-03-29 NOTE — PLAN
[FreeTextEntry1] : Advised Reviewed recent labs from the hospital with the patient. CBC, CMP all normal

## 2024-03-29 NOTE — PHYSICAL EXAM
[No JVD] : no jugular venous distention [No Respiratory Distress] : no respiratory distress  [Normal] : affect was normal and insight and judgment were intact [de-identified] : Unable to examine due to telehealth visit

## 2024-03-29 NOTE — HISTORY OF PRESENT ILLNESS
[Other: ___] : [unfilled] [Medical Office: ([facility])___] : at the medical office located in  [Home] : at home, [unfilled] , at the time of the visit. [Verbal consent obtained from patient] : the patient, [unfilled] [FreeTextEntry4] : Carlita Shelton [FreeTextEntry6] : Pt presents for medication refill. Testosterone refill

## 2024-05-09 ENCOUNTER — RX RENEWAL (OUTPATIENT)
Age: 54
End: 2024-05-09

## 2024-05-29 ENCOUNTER — RX RENEWAL (OUTPATIENT)
Age: 54
End: 2024-05-29

## 2024-05-31 ENCOUNTER — APPOINTMENT (OUTPATIENT)
Dept: FAMILY MEDICINE | Facility: CLINIC | Age: 54
End: 2024-05-31
Payer: MEDICAID

## 2024-05-31 DIAGNOSIS — E66.9 OBESITY, UNSPECIFIED: ICD-10-CM

## 2024-05-31 DIAGNOSIS — I25.10 ATHEROSCLEROTIC HEART DISEASE OF NATIVE CORONARY ARTERY W/OUT ANGINA PECTORIS: ICD-10-CM

## 2024-05-31 DIAGNOSIS — F43.22 ADJUSTMENT DISORDER WITH ANXIETY: ICD-10-CM

## 2024-05-31 DIAGNOSIS — F98.8 OTHER SPECIFIED BEHAVIORAL AND EMOTIONAL DISORDERS WITH ONSET USUALLY OCCURRING IN CHILDHOOD AND ADOLESCENCE: ICD-10-CM

## 2024-05-31 DIAGNOSIS — R79.89 OTHER SPECIFIED ABNORMAL FINDINGS OF BLOOD CHEMISTRY: ICD-10-CM

## 2024-05-31 DIAGNOSIS — F41.9 ANXIETY DISORDER, UNSPECIFIED: ICD-10-CM

## 2024-05-31 PROCEDURE — 99214 OFFICE O/P EST MOD 30 MIN: CPT

## 2024-05-31 RX ORDER — LOSARTAN POTASSIUM AND HYDROCHLOROTHIAZIDE 25; 100 MG/1; MG/1
100-25 TABLET ORAL
Qty: 90 | Refills: 0 | Status: ACTIVE | COMMUNITY
Start: 2018-02-28 | End: 1900-01-01

## 2024-05-31 RX ORDER — ROSUVASTATIN CALCIUM 40 MG/1
40 TABLET, FILM COATED ORAL
Qty: 90 | Refills: 0 | Status: ACTIVE | COMMUNITY
Start: 2021-10-20 | End: 1900-01-01

## 2024-05-31 RX ORDER — PANTOPRAZOLE 40 MG/1
40 TABLET, DELAYED RELEASE ORAL
Qty: 90 | Refills: 0 | Status: ACTIVE | COMMUNITY
Start: 2024-02-15 | End: 1900-01-01

## 2024-05-31 RX ORDER — LEVOTHYROXINE SODIUM 0.05 MG/1
50 TABLET ORAL
Qty: 90 | Refills: 1 | Status: ACTIVE | COMMUNITY
Start: 2018-04-13 | End: 1900-01-01

## 2024-05-31 RX ORDER — DICLOFENAC SODIUM 75 MG/1
75 TABLET, DELAYED RELEASE ORAL
Qty: 30 | Refills: 2 | Status: ACTIVE | COMMUNITY
Start: 2020-08-28 | End: 1900-01-01

## 2024-05-31 RX ORDER — METFORMIN ER 500 MG 500 MG/1
500 TABLET ORAL
Qty: 90 | Refills: 0 | Status: ACTIVE | COMMUNITY
Start: 2024-02-15 | End: 1900-01-01

## 2024-05-31 RX ORDER — DEXTROAMPHETAMINE SACCHARATE, AMPHETAMINE ASPARTATE, DEXTROAMPHETAMINE SULFATE AND AMPHETAMINE SULFATE 7.5; 7.5; 7.5; 7.5 MG/1; MG/1; MG/1; MG/1
30 TABLET ORAL
Qty: 90 | Refills: 0 | Status: ACTIVE | COMMUNITY
Start: 2018-04-13 | End: 1900-01-01

## 2024-05-31 RX ORDER — ZOLPIDEM TARTRATE 10 MG/1
10 TABLET ORAL
Qty: 30 | Refills: 2 | Status: ACTIVE | COMMUNITY
Start: 2020-04-16 | End: 1900-01-01

## 2024-05-31 RX ORDER — BLOOD-GLUCOSE METER
W/DEVICE EACH MISCELLANEOUS
Qty: 1 | Refills: 0 | Status: DISCONTINUED | COMMUNITY
Start: 2022-07-14 | End: 2024-05-31

## 2024-05-31 NOTE — PHYSICAL EXAM
[Normal] : no acute distress, well nourished, well developed and well-appearing [Normal Sclera/Conjunctiva] : normal sclera/conjunctiva [Normal Outer Ear/Nose] : the outer ears and nose were normal in appearance [No JVD] : no jugular venous distention [No Respiratory Distress] : no respiratory distress  [de-identified] : Unable to examine due to telehealth visit

## 2024-06-03 ENCOUNTER — RX RENEWAL (OUTPATIENT)
Age: 54
End: 2024-06-03

## 2024-06-03 RX ORDER — ESCITALOPRAM OXALATE 20 MG/1
20 TABLET ORAL
Qty: 90 | Refills: 0 | Status: ACTIVE | COMMUNITY
Start: 2019-02-11 | End: 1900-01-01

## 2024-06-20 ENCOUNTER — APPOINTMENT (OUTPATIENT)
Dept: ORTHOPEDIC SURGERY | Facility: CLINIC | Age: 54
End: 2024-06-20
Payer: OTHER MISCELLANEOUS

## 2024-06-20 DIAGNOSIS — S46.012A STRAIN OF MUSCLE(S) AND TENDON(S) OF THE ROTATOR CUFF OF LEFT SHOULDER, INITIAL ENCOUNTER: ICD-10-CM

## 2024-06-20 PROCEDURE — 20610 DRAIN/INJ JOINT/BURSA W/O US: CPT | Mod: LT

## 2024-06-20 PROCEDURE — 99213 OFFICE O/P EST LOW 20 MIN: CPT | Mod: ACP,25

## 2024-06-20 NOTE — HISTORY OF PRESENT ILLNESS
[Work related] : work related [8] : 8 [Radiating] : radiating [Sharp] : sharp [Injection therapy] : injection therapy [Not working due to injury] : Work status: not working due to injury [] : yes [FreeTextEntry3] : 8/23/23 [FreeTextEntry5] : 52 y/o M presents for WCFU eval of the Lt. shldr today. Previous tx CSI with good relief.  [FreeTextEntry6] : Numbness  [FreeTextEntry7] : down Lt. 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

## 2024-06-20 NOTE — ASSESSMENT
[FreeTextEntry1] : The patient comes in today for follow-up on his left shoulder.  The patient had excellent relief from his previous two injections and wishes to try these again. He continues to have pain in the left shoulder consistent with his torn rotator cuff and subluxed biceps.  He has been out of work since October 22. So far the rest is definitely helped him and he feels a little bit better although he continues to have pain and weakness doing overhead activities and has pain at rest and at night.  Left shoulder exam: Neurovascularly intact. Sensation intact. Examination of the left shoulder reveals forward elevation 150, external rotation is 60, and internal rotation to T10.  There is 5 out of 5 strength of deltoid and rotator cuff.  There is a positive cross chest sign.  Positive speeds test.  There is 5-/5 strength of supraspinatus and 5-/5 strength of subscapularis.  There is no instability or apprehension.  There is normal deltoid strength.  He has markedly positive Neer and Shah impingement signs.  There is no instability or apprehension.  The patient received a left shoulder subacromial and IA injection using kenalog. The patient is set to return to full work duty on 6/27/24. His left shoulder pain is causally related to his work accident.  He will return as needed.

## 2024-06-20 NOTE — PROCEDURE
[Glenohumeral Joint] : glenohumeral joint [Large Joint Injection] : Large joint injection [Left] : of the left [Subacromial Space] : subacromial space [Pain] : pain [Inflammation] : inflammation [Alcohol] : alcohol [Betadine] : betadine [Ethyl Chloride sprayed topically] : ethyl chloride sprayed topically [Sterile technique used] : sterile technique used [___ cc    1%] : Lidocaine ~Vcc of 1%  [___ cc    0.25%] : Bupivacaine (Marcaine) ~Vcc of 0.25%  [___ cc    40mg] : Triamcinolone (Kenalog) ~Vcc of 40 mg  [] : Patient tolerated procedure well [Call if redness, pain or fever occur] : call if redness, pain or fever occur [Previous OTC use and PT nontherapeutic] : patient has tried OTC's including aspirin, Ibuprofen, Aleve, etc or prescription NSAIDS, and/or exercises at home and/or physical therapy without satisfactory response [Patient had decreased mobility in the joint] : patient had decreased mobility in the joint [Risks, benefits, alternatives discussed / Verbal consent obtained] : the risks benefits, and alternatives have been discussed, and verbal consent was obtained

## 2024-06-24 ENCOUNTER — APPOINTMENT (OUTPATIENT)
Dept: FAMILY MEDICINE | Facility: CLINIC | Age: 54
End: 2024-06-24
Payer: MEDICAID

## 2024-06-24 VITALS
HEIGHT: 67 IN | OXYGEN SATURATION: 98 % | HEART RATE: 84 BPM | SYSTOLIC BLOOD PRESSURE: 120 MMHG | WEIGHT: 210 LBS | TEMPERATURE: 97.9 F | BODY MASS INDEX: 32.96 KG/M2 | DIASTOLIC BLOOD PRESSURE: 84 MMHG

## 2024-06-24 DIAGNOSIS — N40.1 BENIGN PROSTATIC HYPERPLASIA WITH LOWER URINARY TRACT SYMPMS: ICD-10-CM

## 2024-06-24 DIAGNOSIS — E29.1 TESTICULAR HYPOFUNCTION: ICD-10-CM

## 2024-06-24 DIAGNOSIS — H53.8 OTHER VISUAL DISTURBANCES: ICD-10-CM

## 2024-06-24 DIAGNOSIS — E03.9 HYPOTHYROIDISM, UNSPECIFIED: ICD-10-CM

## 2024-06-24 DIAGNOSIS — E11.9 TYPE 2 DIABETES MELLITUS W/OUT COMPLICATIONS: ICD-10-CM

## 2024-06-24 DIAGNOSIS — I10 ESSENTIAL (PRIMARY) HYPERTENSION: ICD-10-CM

## 2024-06-24 DIAGNOSIS — E78.5 HYPERLIPIDEMIA, UNSPECIFIED: ICD-10-CM

## 2024-06-24 DIAGNOSIS — N13.8 BENIGN PROSTATIC HYPERPLASIA WITH LOWER URINARY TRACT SYMPMS: ICD-10-CM

## 2024-06-24 DIAGNOSIS — F43.21 ADJUSTMENT DISORDER WITH DEPRESSED MOOD: ICD-10-CM

## 2024-06-24 PROCEDURE — 99214 OFFICE O/P EST MOD 30 MIN: CPT

## 2024-06-24 RX ORDER — TESTOSTERONE CYPIONATE 200 MG/ML
200 INJECTION, SOLUTION INTRAMUSCULAR
Qty: 9 | Refills: 0 | Status: ACTIVE | COMMUNITY
Start: 2018-05-21 | End: 1900-01-01

## 2024-06-26 LAB
ALBUMIN SERPL ELPH-MCNC: 5.1 G/DL
ALP BLD-CCNC: 76 U/L
ALT SERPL-CCNC: 34 U/L
ANION GAP SERPL CALC-SCNC: 13 MMOL/L
AST SERPL-CCNC: 23 U/L
BASOPHILS # BLD AUTO: 0.05 K/UL
BASOPHILS NFR BLD AUTO: 0.6 %
BILIRUB SERPL-MCNC: 0.5 MG/DL
BUN SERPL-MCNC: 14 MG/DL
CALCIUM SERPL-MCNC: 10.1 MG/DL
CHLORIDE SERPL-SCNC: 100 MMOL/L
CHOLEST SERPL-MCNC: 120 MG/DL
CO2 SERPL-SCNC: 27 MMOL/L
CREAT SERPL-MCNC: 1.11 MG/DL
EGFR: 79 ML/MIN/1.73M2
EOSINOPHIL # BLD AUTO: 0.1 K/UL
EOSINOPHIL NFR BLD AUTO: 1.3 %
ESTIMATED AVERAGE GLUCOSE: 114 MG/DL
GLUCOSE SERPL-MCNC: 84 MG/DL
HBA1C MFR BLD HPLC: 5.6 %
HCT VFR BLD CALC: 50.7 %
HDLC SERPL-MCNC: 50 MG/DL
HGB BLD-MCNC: 16.4 G/DL
IMM GRANULOCYTES NFR BLD AUTO: 0.4 %
LDLC SERPL CALC-MCNC: 59 MG/DL
LYMPHOCYTES # BLD AUTO: 2.1 K/UL
LYMPHOCYTES NFR BLD AUTO: 26.3 %
MAN DIFF?: NORMAL
MCHC RBC-ENTMCNC: 29.1 PG
MCHC RBC-ENTMCNC: 32.3 GM/DL
MCV RBC AUTO: 90.1 FL
MONOCYTES # BLD AUTO: 0.78 K/UL
MONOCYTES NFR BLD AUTO: 9.8 %
NEUTROPHILS # BLD AUTO: 4.91 K/UL
NEUTROPHILS NFR BLD AUTO: 61.6 %
NONHDLC SERPL-MCNC: 70 MG/DL
PLATELET # BLD AUTO: 346 K/UL
POTASSIUM SERPL-SCNC: 4.5 MMOL/L
PROT SERPL-MCNC: 7.5 G/DL
RBC # BLD: 5.63 M/UL
RBC # FLD: 14.1 %
SODIUM SERPL-SCNC: 140 MMOL/L
T3FREE SERPL-MCNC: 3.66 PG/ML
T4 FREE SERPL-MCNC: 1.6 NG/DL
TESTOST SERPL-MCNC: 452 NG/DL
TRIGL SERPL-MCNC: 47 MG/DL
TSH SERPL-ACNC: 1.01 UIU/ML
WBC # FLD AUTO: 7.97 K/UL

## 2024-07-03 ENCOUNTER — NON-APPOINTMENT (OUTPATIENT)
Age: 54
End: 2024-07-03

## 2024-07-03 RX ORDER — PREDNISONE 10 MG/1
10 TABLET ORAL
Qty: 10 | Refills: 0 | Status: ACTIVE | COMMUNITY
Start: 2024-07-03 | End: 1900-01-01

## 2024-07-09 ENCOUNTER — RX RENEWAL (OUTPATIENT)
Age: 54
End: 2024-07-09

## 2024-07-19 ENCOUNTER — APPOINTMENT (OUTPATIENT)
Dept: FAMILY MEDICINE | Facility: CLINIC | Age: 54
End: 2024-07-19

## 2024-08-10 ENCOUNTER — APPOINTMENT (OUTPATIENT)
Dept: FAMILY MEDICINE | Facility: CLINIC | Age: 54
End: 2024-08-10

## 2024-08-12 ENCOUNTER — APPOINTMENT (OUTPATIENT)
Dept: UROLOGY | Facility: CLINIC | Age: 54
End: 2024-08-12

## 2024-08-21 ENCOUNTER — APPOINTMENT (OUTPATIENT)
Dept: FAMILY MEDICINE | Facility: CLINIC | Age: 54
End: 2024-08-21

## 2024-09-04 ENCOUNTER — APPOINTMENT (OUTPATIENT)
Dept: FAMILY MEDICINE | Facility: CLINIC | Age: 54
End: 2024-09-04

## 2024-09-12 ENCOUNTER — APPOINTMENT (OUTPATIENT)
Dept: FAMILY MEDICINE | Facility: CLINIC | Age: 54
End: 2024-09-12
Payer: MEDICAID

## 2024-09-12 DIAGNOSIS — E34.9 ENDOCRINE DISORDER, UNSPECIFIED: ICD-10-CM

## 2024-09-12 DIAGNOSIS — E11.9 TYPE 2 DIABETES MELLITUS W/OUT COMPLICATIONS: ICD-10-CM

## 2024-09-12 DIAGNOSIS — I10 ESSENTIAL (PRIMARY) HYPERTENSION: ICD-10-CM

## 2024-09-12 DIAGNOSIS — R79.89 OTHER SPECIFIED ABNORMAL FINDINGS OF BLOOD CHEMISTRY: ICD-10-CM

## 2024-09-12 DIAGNOSIS — G47.33 OBSTRUCTIVE SLEEP APNEA (ADULT) (PEDIATRIC): ICD-10-CM

## 2024-09-12 DIAGNOSIS — F98.8 OTHER SPECIFIED BEHAVIORAL AND EMOTIONAL DISORDERS WITH ONSET USUALLY OCCURRING IN CHILDHOOD AND ADOLESCENCE: ICD-10-CM

## 2024-09-12 DIAGNOSIS — E29.1 TESTICULAR HYPOFUNCTION: ICD-10-CM

## 2024-09-12 DIAGNOSIS — N52.9 MALE ERECTILE DYSFUNCTION, UNSPECIFIED: ICD-10-CM

## 2024-09-12 DIAGNOSIS — I25.10 ATHEROSCLEROTIC HEART DISEASE OF NATIVE CORONARY ARTERY W/OUT ANGINA PECTORIS: ICD-10-CM

## 2024-09-12 DIAGNOSIS — I82.409 ACUTE EMBOLISM AND THROMBOSIS OF UNSPECIFIED DEEP VEINS OF UNSPECIFIED LOWER EXTREMITY: ICD-10-CM

## 2024-09-12 PROCEDURE — 99214 OFFICE O/P EST MOD 30 MIN: CPT | Mod: 95

## 2024-09-12 RX ORDER — APIXABAN 5 MG/1
5 TABLET, FILM COATED ORAL
Qty: 60 | Refills: 0 | Status: ACTIVE | COMMUNITY
Start: 2024-09-12

## 2024-09-12 NOTE — HISTORY OF PRESENT ILLNESS
[Home] : at home, [unfilled] , at the time of the visit. [Medical Office: ([facility])___] : at the medical office located in  [Verbal consent obtained from patient] : the patient, [unfilled] [FreeTextEntry4] : Avelina Bhandari [FreeTextEntry6] : Pt presents for medication renewals

## 2024-09-22 ENCOUNTER — NON-APPOINTMENT (OUTPATIENT)
Age: 54
End: 2024-09-22

## 2024-09-25 ENCOUNTER — APPOINTMENT (OUTPATIENT)
Dept: ORTHOPEDIC SURGERY | Facility: CLINIC | Age: 54
End: 2024-09-25

## 2024-09-30 NOTE — ED ADULT NURSE NOTE - CAS TRG GEN SKIN COLOR
Rx Refill Note  Requested Prescriptions     Pending Prescriptions Disp Refills    pantoprazole (PROTONIX) 40 MG EC tablet [Pharmacy Med Name: PANTOPRAZOLE SOD DR 40 MG TAB] 180 tablet 3     Sig: TAKE 1 TABLET BY MOUTH TWICE A DAY      Last office visit with prescribing clinician: Visit date not found   Last telemedicine visit with prescribing clinician: Visit date not found   Next office visit with prescribing clinician: Visit date not found                         Would you like a call back once the refill request has been completed: [] Yes [] No    If the office needs to give you a call back, can they leave a voicemail: [] Yes [] No    Nicolasa Richardson, [facility]  09/30/24, 09:38 EDT    Normal for race

## 2024-10-07 ENCOUNTER — RX CHANGE (OUTPATIENT)
Age: 54
End: 2024-10-07

## 2024-10-07 RX ORDER — FLUTICASONE PROPIONATE 50 UG/1
50 SPRAY, METERED NASAL
Qty: 48 | Refills: 1 | Status: ACTIVE | COMMUNITY
Start: 1900-01-01 | End: 1900-01-01

## 2024-10-14 ENCOUNTER — APPOINTMENT (OUTPATIENT)
Dept: ORTHOPEDIC SURGERY | Facility: CLINIC | Age: 54
End: 2024-10-14

## 2024-10-28 ENCOUNTER — TRANSCRIPTION ENCOUNTER (OUTPATIENT)
Age: 54
End: 2024-10-28

## 2024-10-29 ENCOUNTER — TRANSCRIPTION ENCOUNTER (OUTPATIENT)
Age: 54
End: 2024-10-29

## 2024-12-09 ENCOUNTER — RX RENEWAL (OUTPATIENT)
Age: 54
End: 2024-12-09

## 2024-12-23 ENCOUNTER — APPOINTMENT (OUTPATIENT)
Dept: FAMILY MEDICINE | Facility: CLINIC | Age: 54
End: 2024-12-23
Payer: COMMERCIAL

## 2024-12-23 VITALS
SYSTOLIC BLOOD PRESSURE: 130 MMHG | HEART RATE: 78 BPM | OXYGEN SATURATION: 97 % | TEMPERATURE: 98 F | HEIGHT: 67 IN | DIASTOLIC BLOOD PRESSURE: 80 MMHG | BODY MASS INDEX: 32.65 KG/M2 | WEIGHT: 208 LBS

## 2024-12-23 DIAGNOSIS — I10 ESSENTIAL (PRIMARY) HYPERTENSION: ICD-10-CM

## 2024-12-23 DIAGNOSIS — E29.1 TESTICULAR HYPOFUNCTION: ICD-10-CM

## 2024-12-23 DIAGNOSIS — E03.9 HYPOTHYROIDISM, UNSPECIFIED: ICD-10-CM

## 2024-12-23 DIAGNOSIS — F98.8 OTHER SPECIFIED BEHAVIORAL AND EMOTIONAL DISORDERS WITH ONSET USUALLY OCCURRING IN CHILDHOOD AND ADOLESCENCE: ICD-10-CM

## 2024-12-23 DIAGNOSIS — E78.5 HYPERLIPIDEMIA, UNSPECIFIED: ICD-10-CM

## 2024-12-23 DIAGNOSIS — K21.9 GASTRO-ESOPHAGEAL REFLUX DISEASE W/OUT ESOPHAGITIS: ICD-10-CM

## 2024-12-23 DIAGNOSIS — E11.9 TYPE 2 DIABETES MELLITUS W/OUT COMPLICATIONS: ICD-10-CM

## 2024-12-23 DIAGNOSIS — I25.10 ATHEROSCLEROTIC HEART DISEASE OF NATIVE CORONARY ARTERY W/OUT ANGINA PECTORIS: ICD-10-CM

## 2024-12-23 DIAGNOSIS — F41.9 ANXIETY DISORDER, UNSPECIFIED: ICD-10-CM

## 2024-12-23 PROCEDURE — 99215 OFFICE O/P EST HI 40 MIN: CPT

## 2024-12-23 RX ORDER — BLOOD-GLUCOSE METER
W/DEVICE EACH MISCELLANEOUS
Qty: 1 | Refills: 0 | Status: ACTIVE | COMMUNITY
Start: 2024-12-23 | End: 1900-01-01

## 2024-12-23 RX ORDER — LANCING DEVICE/LANCETS
KIT MISCELLANEOUS
Qty: 100 | Refills: 3 | Status: ACTIVE | COMMUNITY
Start: 2024-12-23 | End: 1900-01-01

## 2024-12-24 LAB
ALBUMIN SERPL ELPH-MCNC: 4.6 G/DL
ALP BLD-CCNC: 89 U/L
ALT SERPL-CCNC: 30 U/L
ANION GAP SERPL CALC-SCNC: 14 MMOL/L
AST SERPL-CCNC: 20 U/L
BASOPHILS # BLD AUTO: 0.06 K/UL
BASOPHILS NFR BLD AUTO: 0.6 %
BILIRUB SERPL-MCNC: 0.3 MG/DL
BUN SERPL-MCNC: 17 MG/DL
CALCIUM SERPL-MCNC: 9.7 MG/DL
CHLORIDE SERPL-SCNC: 104 MMOL/L
CHOLEST SERPL-MCNC: 122 MG/DL
CO2 SERPL-SCNC: 23 MMOL/L
CREAT SERPL-MCNC: 0.91 MG/DL
EGFR: 100 ML/MIN/1.73M2
EOSINOPHIL # BLD AUTO: 0.07 K/UL
EOSINOPHIL NFR BLD AUTO: 0.8 %
GLUCOSE SERPL-MCNC: 76 MG/DL
HCT VFR BLD CALC: 45.1 %
HDLC SERPL-MCNC: 56 MG/DL
HGB BLD-MCNC: 15.4 G/DL
IMM GRANULOCYTES NFR BLD AUTO: 0.2 %
LDLC SERPL CALC-MCNC: 53 MG/DL
LYMPHOCYTES # BLD AUTO: 2.2 K/UL
LYMPHOCYTES NFR BLD AUTO: 23.6 %
MAN DIFF?: NORMAL
MCHC RBC-ENTMCNC: 28.6 PG
MCHC RBC-ENTMCNC: 34.1 G/DL
MCV RBC AUTO: 83.8 FL
MONOCYTES # BLD AUTO: 1.27 K/UL
MONOCYTES NFR BLD AUTO: 13.6 %
NEUTROPHILS # BLD AUTO: 5.71 K/UL
NEUTROPHILS NFR BLD AUTO: 61.2 %
NONHDLC SERPL-MCNC: 66 MG/DL
PLATELET # BLD AUTO: 294 K/UL
POTASSIUM SERPL-SCNC: 4 MMOL/L
PROT SERPL-MCNC: 6.6 G/DL
PSA FREE FLD-MCNC: 26 %
PSA FREE SERPL-MCNC: 0.25 NG/ML
PSA SERPL-MCNC: 0.99 NG/ML
RBC # BLD: 5.38 M/UL
RBC # FLD: 13.3 %
SODIUM SERPL-SCNC: 141 MMOL/L
T3FREE SERPL-MCNC: 3.06 PG/ML
T4 FREE SERPL-MCNC: 1.5 NG/DL
TESTOST SERPL-MCNC: 114 NG/DL
TRIGL SERPL-MCNC: 62 MG/DL
TSH SERPL-ACNC: 1.15 UIU/ML
WBC # FLD AUTO: 9.33 K/UL

## 2024-12-30 ENCOUNTER — APPOINTMENT (OUTPATIENT)
Dept: FAMILY MEDICINE | Facility: CLINIC | Age: 54
End: 2024-12-30

## 2025-01-03 ENCOUNTER — APPOINTMENT (OUTPATIENT)
Dept: FAMILY MEDICINE | Facility: CLINIC | Age: 55
End: 2025-01-03

## 2025-01-21 ENCOUNTER — TRANSCRIPTION ENCOUNTER (OUTPATIENT)
Age: 55
End: 2025-01-21

## 2025-01-23 ENCOUNTER — APPOINTMENT (OUTPATIENT)
Dept: FAMILY MEDICINE | Facility: CLINIC | Age: 55
End: 2025-01-23
Payer: COMMERCIAL

## 2025-01-23 VITALS
BODY MASS INDEX: 32.58 KG/M2 | HEART RATE: 85 BPM | SYSTOLIC BLOOD PRESSURE: 120 MMHG | DIASTOLIC BLOOD PRESSURE: 80 MMHG | OXYGEN SATURATION: 98 % | WEIGHT: 208 LBS | TEMPERATURE: 98.7 F

## 2025-01-23 DIAGNOSIS — F41.9 ANXIETY DISORDER, UNSPECIFIED: ICD-10-CM

## 2025-01-23 DIAGNOSIS — F98.8 OTHER SPECIFIED BEHAVIORAL AND EMOTIONAL DISORDERS WITH ONSET USUALLY OCCURRING IN CHILDHOOD AND ADOLESCENCE: ICD-10-CM

## 2025-01-23 PROCEDURE — 99214 OFFICE O/P EST MOD 30 MIN: CPT

## 2025-01-27 ENCOUNTER — APPOINTMENT (OUTPATIENT)
Facility: CLINIC | Age: 55
End: 2025-01-27

## 2025-01-30 NOTE — HISTORY OF PRESENT ILLNESS
[Home] : at home, [unfilled] , at the time of the visit. [Medical Office: ([facility])___] : at the medical office located in  [Verbal consent obtained from patient] : the patient, [unfilled] [FreeTextEntry1] : The patient-doctor. relationship has been established in a face-to-face fashion on real-time video audio HIPAA compliant communication using telemedicine software.  The patient's identity has been confirmed.  The patient was previously emailed a copy of the telemedicine consent.  The patient has had a chance to review and has now given verbal consent and has requested care to be assessed and treated through telemedicine. The patient understands there may be limitations in this process and that they need not need further follow-up care in the office and/or hospital settings.\par \par Verbal consent was given on Wednesday, Ayanna 10, 2020 at 1:45 PM by the patient.  It was requested by the physician.  A written consent was previously sent for the patient to sign and return.\par \par The patient presents for telehealth consultation.  He has not been seen since last August.  He has not obtained the 3-month blood studies due to personal issues.  He had been seen by his medical doctor who eventually got blood studies and found his hematocrit was in the mid 50s and testosterone over thousand.  He was taken off testosterone 3 months ago.  He states his energy level presently is very low.  Patient was injecting 100 mg of testosterone cypionate weekly. The patient reports that his erections and libido is still good. He has been using 50 mg of sildenafil.  We were unable to use the [employer] platform and an alternative platform was utilized.\par \par PMH: Patient initially presented with the chief complaint of testosterone for evaluation. From the age of [age] the patient had been abusing androgens. He then had 2 children with her partner who he subsequently . For the past year and a half he's been placed on testosterone and is taking 150 mg a week injectable testosterone cyprionate. He has no known drug allergies.  His past medical history demonstrates hypertension and hypercholesterolemia.  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  He does smoke and drinks only socially.  He has no known drug allergies.  His review of systems is non-contributory. His family history is not significant.\par  0

## 2025-02-21 ENCOUNTER — RX RENEWAL (OUTPATIENT)
Age: 55
End: 2025-02-21

## 2025-03-03 ENCOUNTER — TRANSCRIPTION ENCOUNTER (OUTPATIENT)
Age: 55
End: 2025-03-03

## 2025-04-02 ENCOUNTER — APPOINTMENT (OUTPATIENT)
Dept: FAMILY MEDICINE | Facility: CLINIC | Age: 55
End: 2025-04-02

## 2025-04-02 ENCOUNTER — APPOINTMENT (OUTPATIENT)
Facility: CLINIC | Age: 55
End: 2025-04-02

## 2025-04-16 ENCOUNTER — RX RENEWAL (OUTPATIENT)
Age: 55
End: 2025-04-16

## 2025-04-25 ENCOUNTER — APPOINTMENT (OUTPATIENT)
Dept: FAMILY MEDICINE | Facility: CLINIC | Age: 55
End: 2025-04-25
Payer: COMMERCIAL

## 2025-04-25 DIAGNOSIS — J30.9 ALLERGIC RHINITIS, UNSPECIFIED: ICD-10-CM

## 2025-04-25 DIAGNOSIS — E03.9 HYPOTHYROIDISM, UNSPECIFIED: ICD-10-CM

## 2025-04-25 DIAGNOSIS — E66.9 OBESITY, UNSPECIFIED: ICD-10-CM

## 2025-04-25 DIAGNOSIS — E34.9 ENDOCRINE DISORDER, UNSPECIFIED: ICD-10-CM

## 2025-04-25 DIAGNOSIS — E29.1 TESTICULAR HYPOFUNCTION: ICD-10-CM

## 2025-04-25 DIAGNOSIS — F98.8 OTHER SPECIFIED BEHAVIORAL AND EMOTIONAL DISORDERS WITH ONSET USUALLY OCCURRING IN CHILDHOOD AND ADOLESCENCE: ICD-10-CM

## 2025-04-25 DIAGNOSIS — F41.9 ANXIETY DISORDER, UNSPECIFIED: ICD-10-CM

## 2025-04-25 DIAGNOSIS — K21.9 GASTRO-ESOPHAGEAL REFLUX DISEASE W/OUT ESOPHAGITIS: ICD-10-CM

## 2025-04-25 PROCEDURE — 99214 OFFICE O/P EST MOD 30 MIN: CPT | Mod: 95

## 2025-04-25 RX ORDER — LORATADINE 10 MG/1
10 TABLET ORAL
Qty: 90 | Refills: 2 | Status: ACTIVE | COMMUNITY
Start: 2025-04-25 | End: 1900-01-01

## 2025-05-06 ENCOUNTER — RX RENEWAL (OUTPATIENT)
Age: 55
End: 2025-05-06

## 2025-05-06 RX ORDER — MIRTAZAPINE 15 MG/1
15 TABLET, FILM COATED ORAL
Qty: 90 | Refills: 0 | Status: ACTIVE | COMMUNITY
Start: 2025-05-06 | End: 1900-01-01

## 2025-05-10 ENCOUNTER — NON-APPOINTMENT (OUTPATIENT)
Age: 55
End: 2025-05-10

## 2025-05-12 ENCOUNTER — APPOINTMENT (OUTPATIENT)
Dept: FAMILY MEDICINE | Facility: CLINIC | Age: 55
End: 2025-05-12
Payer: COMMERCIAL

## 2025-05-12 VITALS
BODY MASS INDEX: 32.65 KG/M2 | TEMPERATURE: 98.3 F | HEART RATE: 87 BPM | HEIGHT: 67 IN | SYSTOLIC BLOOD PRESSURE: 130 MMHG | OXYGEN SATURATION: 97 % | DIASTOLIC BLOOD PRESSURE: 80 MMHG | WEIGHT: 208 LBS

## 2025-05-12 DIAGNOSIS — N13.8 BENIGN PROSTATIC HYPERPLASIA WITH LOWER URINARY TRACT SYMPMS: ICD-10-CM

## 2025-05-12 DIAGNOSIS — F98.8 OTHER SPECIFIED BEHAVIORAL AND EMOTIONAL DISORDERS WITH ONSET USUALLY OCCURRING IN CHILDHOOD AND ADOLESCENCE: ICD-10-CM

## 2025-05-12 DIAGNOSIS — G47.33 OBSTRUCTIVE SLEEP APNEA (ADULT) (PEDIATRIC): ICD-10-CM

## 2025-05-12 DIAGNOSIS — E29.1 TESTICULAR HYPOFUNCTION: ICD-10-CM

## 2025-05-12 DIAGNOSIS — N40.1 BENIGN PROSTATIC HYPERPLASIA WITH LOWER URINARY TRACT SYMPMS: ICD-10-CM

## 2025-05-12 DIAGNOSIS — E03.9 HYPOTHYROIDISM, UNSPECIFIED: ICD-10-CM

## 2025-05-12 DIAGNOSIS — E11.9 TYPE 2 DIABETES MELLITUS W/OUT COMPLICATIONS: ICD-10-CM

## 2025-05-12 DIAGNOSIS — I25.10 ATHEROSCLEROTIC HEART DISEASE OF NATIVE CORONARY ARTERY W/OUT ANGINA PECTORIS: ICD-10-CM

## 2025-05-12 PROCEDURE — 99215 OFFICE O/P EST HI 40 MIN: CPT

## 2025-05-13 LAB
ALBUMIN SERPL ELPH-MCNC: 4.8 G/DL
ALP BLD-CCNC: 99 U/L
ALT SERPL-CCNC: 50 U/L
ANION GAP SERPL CALC-SCNC: 15 MMOL/L
AST SERPL-CCNC: 46 U/L
BASOPHILS # BLD AUTO: 0.07 K/UL
BASOPHILS NFR BLD AUTO: 0.9 %
BILIRUB SERPL-MCNC: 0.6 MG/DL
BUN SERPL-MCNC: 11 MG/DL
CALCIUM SERPL-MCNC: 9.8 MG/DL
CHLORIDE SERPL-SCNC: 97 MMOL/L
CHOLEST SERPL-MCNC: 129 MG/DL
CO2 SERPL-SCNC: 23 MMOL/L
CREAT SERPL-MCNC: 1.26 MG/DL
EGFRCR SERPLBLD CKD-EPI 2021: 68 ML/MIN/1.73M2
EOSINOPHIL # BLD AUTO: 0.1 K/UL
EOSINOPHIL NFR BLD AUTO: 1.3 %
ESTIMATED AVERAGE GLUCOSE: 117 MG/DL
GLUCOSE SERPL-MCNC: 115 MG/DL
HBA1C MFR BLD HPLC: 5.7 %
HCT VFR BLD CALC: 46.8 %
HDLC SERPL-MCNC: 38 MG/DL
HGB BLD-MCNC: 15.6 G/DL
IMM GRANULOCYTES NFR BLD AUTO: 0.3 %
LDLC SERPL-MCNC: 65 MG/DL
LYMPHOCYTES # BLD AUTO: 2.37 K/UL
LYMPHOCYTES NFR BLD AUTO: 31.6 %
MAN DIFF?: NORMAL
MCHC RBC-ENTMCNC: 28.6 PG
MCHC RBC-ENTMCNC: 33.3 G/DL
MCV RBC AUTO: 85.7 FL
MONOCYTES # BLD AUTO: 0.68 K/UL
MONOCYTES NFR BLD AUTO: 9.1 %
NEUTROPHILS # BLD AUTO: 4.25 K/UL
NEUTROPHILS NFR BLD AUTO: 56.8 %
NONHDLC SERPL-MCNC: 91 MG/DL
PLATELET # BLD AUTO: 276 K/UL
POTASSIUM SERPL-SCNC: 3.8 MMOL/L
PROT SERPL-MCNC: 6.9 G/DL
RBC # BLD: 5.46 M/UL
RBC # FLD: 13.8 %
SODIUM SERPL-SCNC: 135 MMOL/L
T3FREE SERPL-MCNC: 3.9 PG/ML
T4 FREE SERPL-MCNC: 1.5 NG/DL
TESTOST SERPL-MCNC: 118 NG/DL
TRIGL SERPL-MCNC: 150 MG/DL
TSH SERPL-ACNC: 3.13 UIU/ML
WBC # FLD AUTO: 7.49 K/UL

## 2025-06-09 ENCOUNTER — TRANSCRIPTION ENCOUNTER (OUTPATIENT)
Age: 55
End: 2025-06-09

## 2025-06-10 ENCOUNTER — TRANSCRIPTION ENCOUNTER (OUTPATIENT)
Age: 55
End: 2025-06-10

## 2025-07-07 ENCOUNTER — APPOINTMENT (OUTPATIENT)
Facility: CLINIC | Age: 55
End: 2025-07-07
Payer: OTHER MISCELLANEOUS

## 2025-07-07 ENCOUNTER — APPOINTMENT (OUTPATIENT)
Facility: CLINIC | Age: 55
End: 2025-07-07

## 2025-07-07 VITALS — HEIGHT: 67 IN | BODY MASS INDEX: 32.65 KG/M2 | WEIGHT: 208 LBS

## 2025-07-07 PROCEDURE — 20610 DRAIN/INJ JOINT/BURSA W/O US: CPT | Mod: LT

## 2025-07-07 PROCEDURE — J3490M: CUSTOM

## 2025-07-07 PROCEDURE — 99243 OFF/OP CNSLTJ NEW/EST LOW 30: CPT | Mod: 25

## 2025-07-16 ENCOUNTER — TRANSCRIPTION ENCOUNTER (OUTPATIENT)
Age: 55
End: 2025-07-16

## 2025-07-16 RX ORDER — BLOOD SUGAR DIAGNOSTIC
STRIP MISCELLANEOUS
Qty: 100 | Refills: 2 | Status: ACTIVE | COMMUNITY
Start: 2025-07-16 | End: 1900-01-01

## 2025-07-16 RX ORDER — BLOOD-GLUCOSE METER
W/DEVICE EACH MISCELLANEOUS
Qty: 1 | Refills: 0 | Status: ACTIVE | COMMUNITY
Start: 2025-07-16 | End: 1900-01-01

## 2025-07-31 ENCOUNTER — RX RENEWAL (OUTPATIENT)
Age: 55
End: 2025-07-31

## 2025-08-04 ENCOUNTER — APPOINTMENT (OUTPATIENT)
Dept: FAMILY MEDICINE | Facility: CLINIC | Age: 55
End: 2025-08-04
Payer: COMMERCIAL

## 2025-08-04 VITALS
HEIGHT: 67 IN | SYSTOLIC BLOOD PRESSURE: 132 MMHG | BODY MASS INDEX: 34.06 KG/M2 | DIASTOLIC BLOOD PRESSURE: 82 MMHG | WEIGHT: 217 LBS

## 2025-08-04 DIAGNOSIS — E29.1 TESTICULAR HYPOFUNCTION: ICD-10-CM

## 2025-08-04 DIAGNOSIS — E78.5 HYPERLIPIDEMIA, UNSPECIFIED: ICD-10-CM

## 2025-08-04 DIAGNOSIS — E34.9 ENDOCRINE DISORDER, UNSPECIFIED: ICD-10-CM

## 2025-08-04 DIAGNOSIS — N13.8 BENIGN PROSTATIC HYPERPLASIA WITH LOWER URINARY TRACT SYMPMS: ICD-10-CM

## 2025-08-04 DIAGNOSIS — I10 ESSENTIAL (PRIMARY) HYPERTENSION: ICD-10-CM

## 2025-08-04 DIAGNOSIS — E03.9 HYPOTHYROIDISM, UNSPECIFIED: ICD-10-CM

## 2025-08-04 DIAGNOSIS — F41.9 ANXIETY DISORDER, UNSPECIFIED: ICD-10-CM

## 2025-08-04 DIAGNOSIS — N40.1 BENIGN PROSTATIC HYPERPLASIA WITH LOWER URINARY TRACT SYMPMS: ICD-10-CM

## 2025-08-04 DIAGNOSIS — F98.8 OTHER SPECIFIED BEHAVIORAL AND EMOTIONAL DISORDERS WITH ONSET USUALLY OCCURRING IN CHILDHOOD AND ADOLESCENCE: ICD-10-CM

## 2025-08-04 DIAGNOSIS — K21.9 GASTRO-ESOPHAGEAL REFLUX DISEASE W/OUT ESOPHAGITIS: ICD-10-CM

## 2025-08-04 PROCEDURE — 99214 OFFICE O/P EST MOD 30 MIN: CPT | Mod: 95

## 2025-08-04 RX ORDER — TIRZEPATIDE 5 MG/.5ML
5 INJECTION, SOLUTION SUBCUTANEOUS
Qty: 4 | Refills: 1 | Status: ACTIVE | COMMUNITY
Start: 2025-08-04 | End: 1900-01-01

## 2025-08-12 ENCOUNTER — APPOINTMENT (OUTPATIENT)
Dept: CARDIOLOGY | Facility: CLINIC | Age: 55
End: 2025-08-12

## 2025-08-19 ENCOUNTER — APPOINTMENT (OUTPATIENT)
Facility: CLINIC | Age: 55
End: 2025-08-19

## 2025-08-20 ENCOUNTER — TRANSCRIPTION ENCOUNTER (OUTPATIENT)
Age: 55
End: 2025-08-20

## 2025-09-11 ENCOUNTER — APPOINTMENT (OUTPATIENT)
Dept: CARDIOLOGY | Facility: CLINIC | Age: 55
End: 2025-09-11